# Patient Record
Sex: MALE | Race: BLACK OR AFRICAN AMERICAN | NOT HISPANIC OR LATINO | Employment: OTHER | URBAN - METROPOLITAN AREA
[De-identification: names, ages, dates, MRNs, and addresses within clinical notes are randomized per-mention and may not be internally consistent; named-entity substitution may affect disease eponyms.]

---

## 2021-02-26 ENCOUNTER — TELEPHONE (OUTPATIENT)
Dept: SCHEDULING | Facility: CLINIC | Age: 66
End: 2021-02-26

## 2021-02-26 NOTE — TELEPHONE ENCOUNTER
Cardiac Clearance     Name of caller: Clint    Relationship to patient: self    Name of patient: Clint DOWELL [place]    Name of physician: Deven Caban MD    Date of Surgery: to be determine    Type of Surgery: gastric bypass/ cataract surgery    Patient is having a couple of surgery coming first. Cataract surgery will be first.  Surgeon: Dr. Erlin Leal, pt does not have any numbers

## 2021-03-05 ENCOUNTER — OFFICE VISIT (OUTPATIENT)
Dept: CARDIOLOGY | Facility: CLINIC | Age: 66
End: 2021-03-05
Payer: MEDICARE

## 2021-03-05 VITALS — BODY MASS INDEX: 41.73 KG/M2 | HEIGHT: 72 IN | HEART RATE: 58 BPM | WEIGHT: 308.1 LBS

## 2021-03-05 DIAGNOSIS — Z01.810 PRE-OPERATIVE CARDIOVASCULAR EXAMINATION: Primary | ICD-10-CM

## 2021-03-05 PROCEDURE — 99205 OFFICE O/P NEW HI 60 MIN: CPT | Performed by: INTERNAL MEDICINE

## 2021-03-05 PROCEDURE — 93000 ELECTROCARDIOGRAM COMPLETE: CPT | Performed by: INTERNAL MEDICINE

## 2021-03-05 RX ORDER — TESTOSTERONE CYPIONATE 200 MG/ML
INJECTION, SOLUTION INTRAMUSCULAR
COMMUNITY

## 2021-03-05 RX ORDER — ASPIRIN 81 MG/1
81 TABLET ORAL DAILY
COMMUNITY
End: 2022-08-24 | Stop reason: ALTCHOICE

## 2021-03-05 RX ORDER — ATORVASTATIN CALCIUM 40 MG/1
40 TABLET, FILM COATED ORAL NIGHTLY
COMMUNITY
Start: 2020-08-10

## 2021-03-05 RX ORDER — PANTOPRAZOLE SODIUM 40 MG/1
40 TABLET, DELAYED RELEASE ORAL AS NEEDED
COMMUNITY

## 2021-03-05 RX ORDER — CARVEDILOL 12.5 MG/1
12.5 TABLET ORAL
COMMUNITY
Start: 2021-01-20 | End: 2021-03-05 | Stop reason: ALTCHOICE

## 2021-03-05 RX ORDER — TAMSULOSIN HYDROCHLORIDE 0.4 MG/1
0.4 CAPSULE ORAL DAILY
COMMUNITY

## 2021-03-05 RX ORDER — CHLORTHALIDONE 25 MG/1
25 TABLET ORAL DAILY
Qty: 90 TABLET | Refills: 1 | Status: SHIPPED | OUTPATIENT
Start: 2021-03-05 | End: 2021-08-17

## 2021-03-05 RX ORDER — FOLIC ACID 1 MG/1
1 TABLET ORAL DAILY
COMMUNITY

## 2021-03-05 RX ORDER — HYDROXYCHLOROQUINE SULFATE 200 MG/1
400 TABLET, FILM COATED ORAL DAILY
COMMUNITY
Start: 2021-02-10

## 2021-03-05 RX ORDER — DICLOFENAC SODIUM 50 MG/1
75 TABLET, DELAYED RELEASE ORAL AS NEEDED
COMMUNITY
End: 2022-08-24

## 2021-03-05 NOTE — LETTER
March 5, 2021     Kush Wen MD  [address]  [facility] [address]    Patient: Clint Pappas  YOB: 1955  Date of Visit: 3/5/2021      Dear Dr. Wen:    Thank you for referring Clint Pappas to me for evaluation. Below are my notes for this consultation.    If you have questions, please do not hesitate to call me. I look forward to following your patient along with you.         Sincerely,        Deven Caban MD        CC: No Recipients  Deven Caban MD  3/5/2021 10:17 AM  Sign when Signing Visit    CARDIOLOGY OUTPATIENT NOTE      Subjective:    Mr. Pappas is being referred for evaluation because of an abnormal stress test before consideration of bariatric surgery..  He has been having chest discomfort and dyspnea for close to 10 years with exertion.  It led to a cardiac catheterization about 10 years ago which according to him was normal.  He has a longstanding history of hypertension and the numbers in the office significantly different from the numbers at home.  He has angioedema with lisinopril.  He has no claudication or symptoms of cerebrovascular disease.  He has no diabetes mellitus.  He has no dizziness syncope near syncope.  He has no symptoms of heart failure.  He has longstanding rheumatoid arthritis in multiple joints has been affected.  He has intense fatigue not restricted to the morning.    Allergies: Acetaminophen, Lisinopril, and Oxycodone    Medications    Current Outpatient Medications:   •  aspirin 81 mg enteric coated tablet, Take 81 mg by mouth daily., Disp: , Rfl:   •  atorvastatin (LIPITOR) 40 mg tablet, Take 40 mg by mouth nightly., Disp: , Rfl:   •  diclofenac (VOLTAREN) 50 mg EC tablet, Take 50 mg by mouth 2 (two) times a day. One and a half pills, Disp: , Rfl:   •  folic acid (FOLVITE) 1 mg tablet, Take 1 mg by mouth daily., Disp: , Rfl:   •  hydrOXYchloroQUINE (PLAQUENIL) 200 mg tablet, , Disp: , Rfl:   •  methotrexate 2.5 mg tablet,  Take  once a week, Disp: , Rfl:   •  pantoprazole (PROTONIX) 40 mg EC tablet, Take 40 mg by mouth., Disp: , Rfl:   •  tamsulosin (FLOMAX) 0.4 mg capsule, Take 0.4 mg by mouth., Disp: , Rfl:   •  testosterone cypionate (DEPO-TESTOSTERONE) 200 mg/mL injection, every 14 (fourteen) days., Disp: , Rfl:   •  chlorthalidone (HYGROTEN) 25 mg tablet, Take 1 tablet (25 mg total) by mouth daily., Disp: 90 tablet, Rfl: 1      History  Medical History:   Past Medical History:   Diagnosis Date   • Arthritis    • Edema    • Hypertension    • Lipid disorder        Surgical History:   Past Surgical History:   Procedure Laterality Date   • CARPAL TUNNEL RELEASE Bilateral    • EYE SURGERY     • NOSE SURGERY         Social History:   Social History     Socioeconomic History   • Marital status:      Spouse name: None   • Number of children: None   • Years of education: None   • Highest education level: None   Occupational History   • None   Social Needs   • Financial resource strain: None   • Food insecurity     Worry: None     Inability: None   • Transportation needs     Medical: None     Non-medical: None   Tobacco Use   • Smoking status: Former Smoker   • Smokeless tobacco: Never Used   Substance and Sexual Activity   • Alcohol use: Not Currently   • Drug use: Never   • Sexual activity: Defer   Lifestyle   • Physical activity     Days per week: None     Minutes per session: None   • Stress: None   Relationships   • Social connections     Talks on phone: None     Gets together: None     Attends Anglican service: None     Active member of club or organization: None     Attends meetings of clubs or organizations: None     Relationship status: None   • Intimate partner violence     Fear of current or ex partner: None     Emotionally abused: None     Physically abused: None     Forced sexual activity: None   Other Topics Concern   • None   Social History Narrative   • None     Stopped smoking about 40 years ago.  Before that he  was 2 to 3 pack smoker.  Drinks 3 or 4 drinks per day.  Family History:   Family History   Problem Relation Age of Onset   • Heart disease Biological Mother          Review of Systems   10 point review of systems completed and otherwise negative unless noted above in the HPI      OBJECTIVE  Visit Vitals  Pulse (!) 58   Ht 1.829 m (6')   Wt (!) 140 kg (308 lb 1.6 oz)   BMI 41.79 kg/m²     Weights (last 7 days)     Date/Time   Weight    03/05/21 0914   (!) 140 kg (308 lb 1.6 oz)                Physical Exam   He is obese in no acute distress.  Weight is 308 pounds.  Blood pressure is 150/95 right arm sitting.    Normocephalic atraumatic.  No icterus.  No conjunctival abnormalities.  Short thick neck.  Normal jugular venous pressure.  Normal carotid bruits.  Excellent pedal pulses.    Quiet precordium.  S1 and S2 are normal.  Grade 1/6 basal ejection static murmur.    Lungs clear.  Abdomen soft no megalies no masses no abnormal pulsations but he does have an umbilical hernia which is easily reducible.  Large right upper quadrant scar from partial colonic resection.    No rashes breakdown or malignancy of the skin.  No clubbing cyanosis or edema.  Appropriate affect judgment and insight.  No sensorimotor deficits.    Labs  CBC Results     No lab values to display.        CMP Results     No lab values to display.        PT PTT Results     No lab values to display.        Troponin I Results     No lab values to display.          Cardiology results  EKG:    Sinus bradycardia otherwise normal            ASSESSMENT AND PLAN:    1.  Umbilical hernia.  I warned him of the symptoms of strangulation and incarceration.    2.  Morbid obesity.    3.  Hypertension.  May be better controlled at home than in the office.  With the fatigue beta-blockers are not an ideal choice and he developed angioedema with ACE inhibitors.  We will go ahead and taper the carvedilol and initiate chlorthalidone 25 mg p.o. daily with increase potassium  intake.  He tells me that he probably did not like a medication that starts with an A, likely amlodipine.  If diuretics alone do not control the blood pressure I would consider a nondihydropyridine antihypertensive.    4.  An echocardiogram on 1/8/2021 showed mild left ventricular hypertrophy with a 60% ejection fraction and wear-and-tear of the valves.  A pharmacologic stress test showed a large area of ischemia versus attenuation in the inferior wall.  As a result his cardiologist at [place] has recommended a cardiac catheterization.  The catheterization was supposed to take place at [facility] but he does not want to be treated there.  I had a long conversation with him.  The visit in fact lasted over an hour.  I think for a start CTA of his coronaries may rule out significant disease.  He does agree with that approach.  I do not have an assessment of his renal function but he says he had multiple tests yesterday that he will try to bring to me.    5.  Rheumatoid arthritis.  He was on biologicals at some point.  This is an inflammatory disease that increases the risk of coronary disease and aneurysms.  6.  Obstructive sleep apnea on a CPAP machine.  He will stay in touch with me with respect to his blood pressure.  I will await the labs before I schedule him for a coronary CTA.        Deven Caban MD  3/5/2021  10:07 AM

## 2021-03-05 NOTE — PROGRESS NOTES
5/21/21  Coronary CTA completed 4/27/21 and shows composite calcium score 837, with breakdown as follows:  LEFT MAIN: Moderate amount of circumferential calcified plaque seen.  There  appears to be a mild (30-50%) associated stenosis.  However, due to artifact,  the exact severity of stenosis could not be determined..  The vessel appears  most likely patent.     LAD:  Proximal: Diffuse calcified plaque seen.  Severity of stenosis could not be  determined due to artifact..  The vessel appears most likely patent.  Mid: Diffuse calcified plaque seen.  Severity of stenosis cannot be determined  due to artifact..  The vessel appears most likely patent.  Distal: Not well seen..  DIAG 1: Not well seen..  DIAG 2: Not well seen..     LCX:  Proximal: Diffuse calcified plaque seen.  The severity of stenosis could not be  determined due to artifact.  The vessel appears most likely patent.  Mid: Calcified plaque seen.  Severity of stenosis cannot be determined due to  artifact..  The vessel appears most likely patent.  Distal: Not well seen.  OM1:  Not well seen.  LPLB: Not well seen.  LPDA: Not well seen.     RCA:  Right coronary artery not well-visualized due to motion artifact..  No calcified  plaque seen.  Small nondominant vessel.    Results discussed with Dr. Perales. Patient has nonobstructive coronary artery disease. HE is on high intensity statin (atorvastatin 40 mg daily), aspirin 81 mg daily, reports controlled BP.  He may proceed with bariatric surgery at acceptable cardiovascular risk.    Deven Caban MD 5/21/21        CARDIOLOGY OUTPATIENT NOTE      Subjective:    Mr. Pappas is being referred for evaluation because of an abnormal stress test before consideration of bariatric surgery..  He has been having chest discomfort and dyspnea for close to 10 years with exertion.  It led to a cardiac catheterization about 10 years ago which according to him was normal.  He has a longstanding history of hypertension  and the numbers in the office significantly different from the numbers at home.  He has angioedema with lisinopril.  He has no claudication or symptoms of cerebrovascular disease.  He has no diabetes mellitus.  He has no dizziness syncope near syncope.  He has no symptoms of heart failure.  He has longstanding rheumatoid arthritis in multiple joints has been affected.  He has intense fatigue not restricted to the morning.    Allergies: Acetaminophen, Lisinopril, and Oxycodone    Medications    Current Outpatient Medications:   •  aspirin 81 mg enteric coated tablet, Take 81 mg by mouth daily., Disp: , Rfl:   •  atorvastatin (LIPITOR) 40 mg tablet, Take 40 mg by mouth nightly., Disp: , Rfl:   •  diclofenac (VOLTAREN) 50 mg EC tablet, Take 50 mg by mouth 2 (two) times a day. One and a half pills, Disp: , Rfl:   •  folic acid (FOLVITE) 1 mg tablet, Take 1 mg by mouth daily., Disp: , Rfl:   •  hydrOXYchloroQUINE (PLAQUENIL) 200 mg tablet, , Disp: , Rfl:   •  methotrexate 2.5 mg tablet, Take  once a week, Disp: , Rfl:   •  pantoprazole (PROTONIX) 40 mg EC tablet, Take 40 mg by mouth., Disp: , Rfl:   •  tamsulosin (FLOMAX) 0.4 mg capsule, Take 0.4 mg by mouth., Disp: , Rfl:   •  testosterone cypionate (DEPO-TESTOSTERONE) 200 mg/mL injection, every 14 (fourteen) days., Disp: , Rfl:   •  chlorthalidone (HYGROTEN) 25 mg tablet, Take 1 tablet (25 mg total) by mouth daily., Disp: 90 tablet, Rfl: 1      History  Medical History:   Past Medical History:   Diagnosis Date   • Arthritis    • Edema    • Hypertension    • Lipid disorder        Surgical History:   Past Surgical History:   Procedure Laterality Date   • CARPAL TUNNEL RELEASE Bilateral    • EYE SURGERY     • NOSE SURGERY         Social History:   Social History     Socioeconomic History   • Marital status:      Spouse name: None   • Number of children: None   • Years of education: None   • Highest education level: None   Occupational History   • None   Social  Needs   • Financial resource strain: None   • Food insecurity     Worry: None     Inability: None   • Transportation needs     Medical: None     Non-medical: None   Tobacco Use   • Smoking status: Former Smoker   • Smokeless tobacco: Never Used   Substance and Sexual Activity   • Alcohol use: Not Currently   • Drug use: Never   • Sexual activity: Defer   Lifestyle   • Physical activity     Days per week: None     Minutes per session: None   • Stress: None   Relationships   • Social connections     Talks on phone: None     Gets together: None     Attends Taoism service: None     Active member of club or organization: None     Attends meetings of clubs or organizations: None     Relationship status: None   • Intimate partner violence     Fear of current or ex partner: None     Emotionally abused: None     Physically abused: None     Forced sexual activity: None   Other Topics Concern   • None   Social History Narrative   • None     Stopped smoking about 40 years ago.  Before that he was 2 to 3 pack smoker.  Drinks 3 or 4 drinks per day.  Family History:   Family History   Problem Relation Age of Onset   • Heart disease Biological Mother          Review of Systems   10 point review of systems completed and otherwise negative unless noted above in the HPI      OBJECTIVE  Visit Vitals  Pulse (!) 58   Ht 1.829 m (6')   Wt (!) 140 kg (308 lb 1.6 oz)   BMI 41.79 kg/m²     Weights (last 7 days)     Date/Time   Weight    03/05/21 0914   (!) 140 kg (308 lb 1.6 oz)                Physical Exam   He is obese in no acute distress.  Weight is 308 pounds.  Blood pressure is 150/95 right arm sitting.    Normocephalic atraumatic.  No icterus.  No conjunctival abnormalities.  Short thick neck.  Normal jugular venous pressure.  Normal carotid bruits.  Excellent pedal pulses.    Quiet precordium.  S1 and S2 are normal.  Grade 1/6 basal ejection static murmur.    Lungs clear.  Abdomen soft no megalies no masses no abnormal pulsations  but he does have an umbilical hernia which is easily reducible.  Large right upper quadrant scar from partial colonic resection.    No rashes breakdown or malignancy of the skin.  No clubbing cyanosis or edema.  Appropriate affect judgment and insight.  No sensorimotor deficits.    Labs  CBC Results     No lab values to display.        CMP Results     No lab values to display.        PT PTT Results     No lab values to display.        Troponin I Results     No lab values to display.          Cardiology results  EKG:    Sinus bradycardia otherwise normal            ASSESSMENT AND PLAN:    1.  Umbilical hernia.  I warned him of the symptoms of strangulation and incarceration.    2.  Morbid obesity.    3.  Hypertension.  May be better controlled at home than in the office.  With the fatigue beta-blockers are not an ideal choice and he developed angioedema with ACE inhibitors.  We will go ahead and taper the carvedilol and initiate chlorthalidone 25 mg p.o. daily with increase potassium intake.  He tells me that he probably did not like a medication that starts with an A, likely amlodipine.  If diuretics alone do not control the blood pressure I would consider a nondihydropyridine antihypertensive.    4.  An echocardiogram on 1/8/2021 showed mild left ventricular hypertrophy with a 60% ejection fraction and wear-and-tear of the valves.  A pharmacologic stress test showed a large area of ischemia versus attenuation in the inferior wall.  As a result his cardiologist at [place] has recommended a cardiac catheterization.  The catheterization was supposed to take place at [facility] but he does not want to be treated there.  I had a long conversation with him.  The visit in fact lasted over an hour.  I think for a start CTA of his coronaries may rule out significant disease.  He does agree with that approach.  I do not have an assessment of his renal function but he says he had multiple tests yesterday that  he will try to bring to me.    5.  Rheumatoid arthritis.  He was on biologicals at some point.  This is an inflammatory disease that increases the risk of coronary disease and aneurysms.  6.  Obstructive sleep apnea on a CPAP machine.  He will stay in touch with me with respect to his blood pressure.  I will await the labs before I schedule him for a coronary CTA.        Deven Caban MD  3/5/2021  10:07 AM

## 2021-03-09 ENCOUNTER — TELEPHONE (OUTPATIENT)
Dept: SCHEDULING | Facility: CLINIC | Age: 66
End: 2021-03-09

## 2021-03-09 NOTE — TELEPHONE ENCOUNTER
Pt calling to verify Dr Caban received his lab results and to see what test/appt he is to schedule next.  Pt states he was schedule some testing after Dr Caban saw his blood work, he believes.  Pt can be reached at [phone].

## 2021-03-09 NOTE — TELEPHONE ENCOUNTER
;  Lab results available in [facility].  Results from 3/4/2021  BUN 18  Creat 0.99  He will stay in touch with me with respect to his blood pressure.  I will await the labs before I schedule him for a coronary CTA.

## 2021-03-24 ENCOUNTER — TELEPHONE (OUTPATIENT)
Dept: CARDIOLOGY | Facility: CLINIC | Age: 66
End: 2021-03-24

## 2021-03-24 DIAGNOSIS — G47.33 OBSTRUCTIVE SLEEP APNEA SYNDROME: ICD-10-CM

## 2021-03-24 DIAGNOSIS — R94.39 ABNORMAL STRESS TEST: Primary | ICD-10-CM

## 2021-03-24 DIAGNOSIS — I51.7 LVH (LEFT VENTRICULAR HYPERTROPHY): ICD-10-CM

## 2021-03-24 DIAGNOSIS — I10 ESSENTIAL HYPERTENSION: ICD-10-CM

## 2021-03-24 NOTE — TELEPHONE ENCOUNTER
I wanted to schedule him for a CTA of his coronaries.  His serum creatinine is normal.  He may proceed.  I do not remember if I issued him a request slip or not but if not let us go ahead and order a CTA of the coronaries.  Indication equivocal stress test.  [facility] has a better scanner if he can make it there

## 2021-03-24 NOTE — TELEPHONE ENCOUNTER
CTA coronary ordered.      Nydirah:    Please precert and assist with scheduling.  [facility] recommended [facility] for test if patient can get there.  Thank you.

## 2021-03-24 NOTE — TELEPHONE ENCOUNTER
Pt calling to follow up.  Pt calling to schedule the procedure that they talked about at his appt.  Pt thought he would hear back to schedule it.  Pt doesn't remember what it was called but states it was a less invasive procedure to check out his heart. Pt will have the bariatric surgery but it wont be for a few months and he thought he was getting this procedure done first.  Pt can be reached at [phone]..

## 2021-04-27 ENCOUNTER — HOSPITAL ENCOUNTER (OUTPATIENT)
Dept: RADIOLOGY | Facility: HOSPITAL | Age: 66
Discharge: HOME | End: 2021-04-27
Attending: INTERNAL MEDICINE
Payer: MEDICARE

## 2021-04-27 VITALS
BODY MASS INDEX: 40.88 KG/M2 | HEART RATE: 50 BPM | RESPIRATION RATE: 18 BRPM | DIASTOLIC BLOOD PRESSURE: 63 MMHG | SYSTOLIC BLOOD PRESSURE: 156 MMHG | WEIGHT: 292 LBS | HEIGHT: 71 IN | OXYGEN SATURATION: 97 %

## 2021-04-27 DIAGNOSIS — I51.7 LVH (LEFT VENTRICULAR HYPERTROPHY): ICD-10-CM

## 2021-04-27 DIAGNOSIS — R94.39 ABNORMAL STRESS TEST: ICD-10-CM

## 2021-04-27 DIAGNOSIS — G47.33 OBSTRUCTIVE SLEEP APNEA SYNDROME: ICD-10-CM

## 2021-04-27 DIAGNOSIS — I10 ESSENTIAL HYPERTENSION: ICD-10-CM

## 2021-04-27 PROCEDURE — 75574 CT ANGIO HRT W/3D IMAGE: CPT | Mod: ME

## 2021-04-27 PROCEDURE — 63700000 HC SELF-ADMINISTRABLE DRUG: Performed by: INTERNAL MEDICINE

## 2021-04-27 PROCEDURE — 63600105 HC IODINE BASED CONTRAST: Performed by: INTERNAL MEDICINE

## 2021-04-27 RX ORDER — METOPROLOL TARTRATE 50 MG/1
50 TABLET ORAL EVERY 30 MIN PRN
Status: DISCONTINUED | OUTPATIENT
Start: 2021-04-27 | End: 2021-04-28 | Stop reason: HOSPADM

## 2021-04-27 RX ORDER — IVABRADINE 7.5 MG/1
15 TABLET, FILM COATED ORAL ONCE
Status: COMPLETED | OUTPATIENT
Start: 2021-04-27 | End: 2021-04-27

## 2021-04-27 RX ORDER — METOPROLOL TARTRATE 50 MG/1
100 TABLET ORAL ONCE
Status: COMPLETED | OUTPATIENT
Start: 2021-04-27 | End: 2021-04-27

## 2021-04-27 RX ORDER — NITROGLYCERIN 0.4 MG/1
0.4 TABLET SUBLINGUAL ONCE
Status: COMPLETED | OUTPATIENT
Start: 2021-04-27 | End: 2021-04-27

## 2021-04-27 RX ORDER — METOPROLOL TARTRATE 1 MG/ML
5 INJECTION, SOLUTION INTRAVENOUS AS NEEDED
Status: DISCONTINUED | OUTPATIENT
Start: 2021-04-27 | End: 2021-04-28 | Stop reason: HOSPADM

## 2021-04-27 RX ADMIN — NITROGLYCERIN 0.4 MG: 0.4 TABLET SUBLINGUAL at 11:33

## 2021-04-27 RX ADMIN — IOHEXOL 100 ML: 350 INJECTION, SOLUTION INTRAVENOUS at 11:49

## 2021-04-27 RX ADMIN — IVABRADINE 15 MG: 7.5 TABLET, FILM COATED ORAL at 09:49

## 2021-04-27 RX ADMIN — METOPROLOL TARTRATE 100 MG: 50 TABLET, FILM COATED ORAL at 09:25

## 2021-04-27 RX ADMIN — METOPROLOL TARTRATE 50 MG: 50 TABLET, FILM COATED ORAL at 09:58

## 2021-04-27 NOTE — DISCHARGE INSTRUCTIONS
Cardiac CT Angiogram    A cardiac CT angiogram is a procedure to look at the heart and the area around the heart. It may be done to help find the cause of chest pains or other symptoms of heart disease. During this procedure, a large X-ray machine, called a CT scanner, takes detailed pictures of the heart and the surrounding area after a dye (contrast material) has been injected into blood vessels in the area. The procedure is also sometimes called a coronary CT angiogram, coronary artery scanning, or CTA.  A cardiac CT angiogram allows the health care provider to see how well blood is flowing to and from the heart. The health care provider will be able to see if there are any problems, such as:  · Blockage or narrowing of the coronary arteries in the heart.  · Fluid around the heart.  · Signs of weakness or disease in the muscles, valves, and tissues of the heart.  Tell a health care provider about:  · Any allergies you have. This is especially important if you have had a previous allergic reaction to contrast dye.  · All medicines you are taking, including vitamins, herbs, eye drops, creams, and over-the-counter medicines.  · Any blood disorders you have.  · Any surgeries you have had.  · Any medical conditions you have.  · Whether you are pregnant or may be pregnant.  · Any anxiety disorders, chronic pain, or other conditions you have that may increase your stress or prevent you from lying still.  What are the risks?  Generally, this is a safe procedure. However, problems may occur, including:  · Bleeding.  · Infection.  · Allergic reactions to medicines or dyes.  · Damage to other structures or organs.  · Kidney damage from the dye or contrast that is used.  · Increased risk of cancer from radiation exposure. This risk is low. Talk with your health care provider about:  ? The risks and benefits of testing.  ? How you can receive the lowest dose of radiation.  What happens before the procedure?  · Wear  comfortable clothing and remove any jewelry, glasses, dentures, and hearing aids.  · Follow instructions from your health care provider about eating and drinking. This may include:  ? For 12 hours before the test -- avoid caffeine. This includes tea, coffee, soda, energy drinks, and diet pills. Drink plenty of water or other fluids that do not have caffeine in them. Being well-hydrated can prevent complications.  ? For 4-6 hours before the test -- stop eating and drinking. The contrast dye can cause nausea, but this is less likely if your stomach is empty.  · Ask your health care provider about changing or stopping your regular medicines. This is especially important if you are taking diabetes medicines, blood thinners, or medicines to treat erectile dysfunction.  What happens during the procedure?  · Hair on your chest may need to be removed so that small sticky patches called electrodes can be placed on your chest. These will transmit information that helps to monitor your heart during the test.  · An IV tube will be inserted into one of your veins.  · You might be given a medicine to control your heart rate during the test. This will help to ensure that good images are obtained.  · You will be asked to lie on an exam table. This table will slide in and out of the CT machine during the procedure.  · Contrast dye will be injected into the IV tube. You might feel warm, or you may get a metallic taste in your mouth.  · You will be given a medicine (nitroglycerin) to relax (dilate) the arteries in your heart.  · The table that you are lying on will move into the CT machine tunnel for the scan.  · The person running the machine will give you instructions while the scans are being done. You may be asked to:  ? Keep your arms above your head.  ? Hold your breath.  ? Stay very still, even if the table is moving.  · When the scanning is complete, you will be moved out of the machine.  · The IV tube will be removed.  The  procedure may vary among health care providers and hospitals.  What happens after the procedure?  · You might feel warm, or you may get a metallic taste in your mouth from the contrast dye.  · You may have a headache from the nitroglycerin.  · After the procedure, drink water or other fluids to wash (flush) the contrast material out of your body.  · Contact a health care provider if you have any symptoms of allergy to the contrast. These symptoms include:  ? Shortness of breath.  ? Rash or hives.  ? A racing heartbeat.  · Most people can return to their normal activities right after the procedure. Ask your health care provider what activities are safe for you.  · It is up to you to get the results of your procedure. Ask your health care provider, or the department that is doing the procedure, when your results will be ready.  Summary  · A cardiac CT angiogram is a procedure to look at the heart and the area around the heart. It may be done to help find the cause of chest pains or other symptoms of heart disease.  · During this procedure, a large X-ray machine, called a CT scanner, takes detailed pictures of the heart and the surrounding area after a dye (contrast material) has been injected into blood vessels in the area.  · Ask your health care provider about changing or stopping your regular medicines before the procedure. This is especially important if you are taking diabetes medicines, blood thinners, or medicines to treat erectile dysfunction.  · After the procedure, drink water or other fluids to wash (flush) the contrast material out of your body.  This information is not intended to replace advice given to you by your health care provider. Make sure you discuss any questions you have with your health care provider.  Document Released: 11/30/2009 Document Revised: 11/30/2018 Document Reviewed: 11/06/2017  Elsevier Patient Education © 2020 [employer]

## 2021-05-06 ENCOUNTER — TELEPHONE (OUTPATIENT)
Dept: SCHEDULING | Facility: CLINIC | Age: 66
End: 2021-05-06

## 2021-05-06 NOTE — TELEPHONE ENCOUNTER
Cardiac Clearance     [facility]    Relationship to patient: Coordinator     Name of patient: Clint Pappas    Name of physician: Deven Caban MD    Date of Surgery: 06/07/21    Type of Surgery: Bariatric Surgery    Name of surgeon: Dr. Alverto Iniguez    Office contact number: [phone]    Office fax number: [phone]   attention Day    Addendums  Is patient able to be cleared based on last appt on 03/05/21  If unable to clear patient, please contact patient at [phone]    Additional notes: If anything is faxed to the office please put Attention Day

## 2021-05-17 ENCOUNTER — DOCUMENTATION (OUTPATIENT)
Dept: CARDIOLOGY | Facility: CLINIC | Age: 66
End: 2021-05-17

## 2021-05-17 NOTE — PROGRESS NOTES
I asked  to review his films (CTA of the coronaries).  His LV left main has less than 50% obstruction which is not flow-limiting and the other coronaries have less than 30% obstruction.  There is no need for a cardiac catheterization.  He needs to have aggressive risk factor modification.

## 2021-05-19 ENCOUNTER — TELEPHONE (OUTPATIENT)
Dept: CARDIOLOGY | Facility: CLINIC | Age: 66
End: 2021-05-19

## 2021-05-19 NOTE — TELEPHONE ENCOUNTER
"S/w pt  His PCP reviewed CT angiogram with him.  He's aware he has coronary artery disease.  He denies any symptoms.  He is taking amlodipine, chlorthalidone, atorvastatin, aspirin.  Reports his cholesterol is \"good\".  He reports hi BP on Monday ( 5/17) was 114/78.  He is scheduled for bariatric surgery on 5/24/21.  He will need addendum to your note from 3/5/21.  Please advise and I will go ahead an do addendum.  "

## 2021-05-25 NOTE — TELEPHONE ENCOUNTER
[facility] was calling to follow up on previous messages regarding CC.    Day can be reached at [phone]

## 2021-05-25 NOTE — TELEPHONE ENCOUNTER
On 5/21/2021, there is a small addendum on the CT scan of the coronaries results saying that he is cleared to proceed.  I hope this has reached him.  If not send them a copy of that particular note of this as well as my last visit note

## 2021-06-08 NOTE — TELEPHONE ENCOUNTER
Day, with [place], calling stating she had not received CCV yet.  OVN with addendum sent.        [facility]             [phone]             [phone]

## 2021-08-17 RX ORDER — CHLORTHALIDONE 25 MG/1
25 TABLET ORAL DAILY
Qty: 90 TABLET | Refills: 0 | Status: SHIPPED | OUTPATIENT
Start: 2021-08-17 | End: 2022-08-24 | Stop reason: ALTCHOICE

## 2021-09-08 ENCOUNTER — TELEPHONE (OUTPATIENT)
Dept: SCHEDULING | Facility: CLINIC | Age: 66
End: 2021-09-08

## 2021-09-08 NOTE — TELEPHONE ENCOUNTER
Pt's calling to get results of CTA and if Dr. Caban would like him to have a follow up appt.    Pt can be reached [phone]

## 2021-09-08 NOTE — TELEPHONE ENCOUNTER
Spoke with patient.  States would like to schedule appointment with [facility].  SOB with exertion X few years.  Known elevated calcium score.  Seen pre-op for planned bariatric surgery.  Did not proceed to surgery.  No recent lipids/cmp.      Jose, can you get patient scheduled for follow up OV,please.  Thank you.

## 2021-09-08 NOTE — TELEPHONE ENCOUNTER
Test Results     Name of caller: Clint    Relationship to patient: Self    Name of patient: Clint Pappas    Name of physician: Deven Caban MD    Type of test: CTA    Best contact number: [phone] pt also wants to know if he needs a follow up appt

## 2021-11-04 ENCOUNTER — OFFICE VISIT (OUTPATIENT)
Dept: CARDIOLOGY | Facility: CLINIC | Age: 66
End: 2021-11-04
Payer: MEDICARE

## 2021-11-04 VITALS — HEART RATE: 77 BPM | BODY MASS INDEX: 38.75 KG/M2 | HEIGHT: 72 IN | WEIGHT: 286.1 LBS

## 2021-11-04 DIAGNOSIS — I10 ESSENTIAL HYPERTENSION: Primary | ICD-10-CM

## 2021-11-04 PROCEDURE — 99214 OFFICE O/P EST MOD 30 MIN: CPT | Performed by: INTERNAL MEDICINE

## 2021-11-04 PROCEDURE — 93000 ELECTROCARDIOGRAM COMPLETE: CPT | Performed by: INTERNAL MEDICINE

## 2021-11-04 PROCEDURE — G8756 NO BP MEASURE DOC: HCPCS | Performed by: INTERNAL MEDICINE

## 2021-11-04 NOTE — LETTER
November 4, 2021     Kush Wen MD  [address]  [facility] [address]    Patient: Clint Pappas  YOB: 1955  Date of Visit: 11/4/2021      Dear Dr. Wen:    Thank you for referring Clint Pappas to me for evaluation. Below are my notes for this consultation.    If you have questions, please do not hesitate to call me. I look forward to following your patient along with you.         Sincerely,        Deven Caban MD        CC: MD Mee Christopher, Deven BUENROSTRO MD  11/4/2021 10:36 AM  Sign when Signing Visit    CARDIOLOGY OUTPATIENT NOTE      Subjective:  Clint change his lifestyle dramatically and add as a result lost 22 pounds.  He feels much better.  He did not go through his bariatric surgery.  He has no angina or dyspnea dizziness syncope near syncope claudication or symptoms of cerebrovascular disease.  He seems to proceed with a right knee arthroplasty and who will be seeing an orthopedist who is part of the [facility].      Allergies: Acetaminophen, Lisinopril, and Oxycodone    Medications    Current Outpatient Medications:   •  aspirin 81 mg enteric coated tablet, Take 81 mg by mouth daily., Disp: , Rfl:   •  atorvastatin (LIPITOR) 40 mg tablet, Take 40 mg by mouth nightly., Disp: , Rfl:   •  chlorthalidone (HYGROTEN) 25 mg tablet, Take 1 tablet (25 mg total) by mouth daily., Disp: 90 tablet, Rfl: 0  •  diclofenac (VOLTAREN) 50 mg EC tablet, Take 50 mg by mouth 2 (two) times a day. One and a half pills, Disp: , Rfl:   •  diclofenac-capsaicin 75 mg- 0.025 % kit, , Disp: , Rfl:   •  folic acid (FOLVITE) 1 mg tablet, Take 1 mg by mouth daily., Disp: , Rfl:   •  golimumab (SIMPONI ARIA IV), Infuse into a venous catheter. Every other month, Disp: , Rfl:   •  hydrOXYchloroQUINE (PLAQUENIL) 200 mg tablet, , Disp: , Rfl:   •  methotrexate 2.5 mg tablet, Take  once a week, Disp: , Rfl:   •  pantoprazole (PROTONIX) 40 mg EC tablet, Take 40 mg by mouth., Disp: , Rfl:   •   tamsulosin (FLOMAX) 0.4 mg capsule, Take 0.4 mg by mouth., Disp: , Rfl:   •  testosterone cypionate (DEPO-TESTOSTERONE) 200 mg/mL injection, every 14 (fourteen) days., Disp: , Rfl:       History  Medical History:   Past Medical History:   Diagnosis Date   • Arthritis    • Edema    • Hypertension    • Lipid disorder        Surgical History:   Past Surgical History:   Procedure Laterality Date   • CARPAL TUNNEL RELEASE Bilateral    • EYE SURGERY     • NOSE SURGERY         Social History:   Social History     Socioeconomic History   • Marital status:      Spouse name: None   • Number of children: None   • Years of education: None   • Highest education level: None   Occupational History   • None   Tobacco Use   • Smoking status: Former Smoker   • Smokeless tobacco: Never Used   Vaping Use   • Vaping Use: Never used   Substance and Sexual Activity   • Alcohol use: Not Currently   • Drug use: Never   • Sexual activity: Defer   Other Topics Concern   • None   Social History Narrative   • None     Social Determinants of Health     Financial Resource Strain:    • Difficulty of Paying Living Expenses: Not on file   Food Insecurity:    • Worried About Running Out of Food in the Last Year: Not on file   • Ran Out of Food in the Last Year: Not on file   Transportation Needs:    • Lack of Transportation (Medical): Not on file   • Lack of Transportation (Non-Medical): Not on file   Physical Activity:    • Days of Exercise per Week: Not on file   • Minutes of Exercise per Session: Not on file   Stress:    • Feeling of Stress : Not on file   Social Connections:    • Frequency of Communication with Friends and Family: Not on file   • Frequency of Social Gatherings with Friends and Family: Not on file   • Attends Anabaptist Services: Not on file   • Active Member of Clubs or Organizations: Not on file   • Attends Club or Organization Meetings: Not on file   • Marital Status: Not on file   Intimate Partner Violence:    • Fear of  "Current or Ex-Partner: Not on file   • Emotionally Abused: Not on file   • Physically Abused: Not on file   • Sexually Abused: Not on file   Housing Stability:    • Unable to Pay for Housing in the Last Year: Not on file   • Number of Places Lived in the Last Year: Not on file   • Unstable Housing in the Last Year: Not on file       Family History:   Family History   Problem Relation Age of Onset   • Heart disease Biological Mother          Review of Systems   14 point review of systems completed and otherwise negative unless noted above in the HPI      OBJECTIVE  Visit Vitals  Pulse 77   Ht 1.816 m (5' 11.5\")   Wt 130 kg (286 lb 1.6 oz)   BMI 39.35 kg/m²     Weights (last 7 days)     Date/Time Weight    11/04/21 0959 130 kg (286 lb 1.6 oz)            Physical Exam   He is obese in no acute distress.  Weight is 286 pounds again 22 pounds less than 8 months ago.  Blood pressure is 120/70 right arm sitting.    Alert oriented x3.  Normocephalic atraumatic.  No icterus.  No conjunctival pallor.  Short thick neck.  Normal jugular venous pressure.  Normal carotid upstroke without bruits.  Excellent pedal pulses.    Quiet precordium.  S1 and S2 are normal.  Grade 1/6 basal ejection solid murmur.    Lungs clear.  Abdomen soft no megalies no masses no abnormal pulsations.  Easily reducible abdominal hernia.  Large right upper quadrant scar from partial colonic resection.    No rashes breakdown or malignancy of the skin.  No clubbing cyanosis or edema.  Appropriate affect judgment and insight.  No sensorimotor deficits.    Labs  CBC Results    No lab values to display.       CMP Results    No lab values to display.       PT PTT Results    No lab values to display.       Troponin I Results    No lab values to display.         Cardiology results  EKG:  Sinus rhythm with 1 ventricular couplet.    CTA of the coronaries on 3/24/2021 reveals a total calcium score of 837.    Left main 30 to 50% stenosis described at mild.    LAD " diffuse calcific plaque.    Circumflex diffuse calcific plaque    RCA no calcified plaque.  Small vessel.    They concluded by saying that the vessels appear most likely patent however the severity could not be definitely determined due to artifact.    ASSESSMENT AND PLAN:    1.  Umbilical hernia.  Again we stressed the symptoms of incarceration or strangulation and need to come in immediately for follow-up.    2.  Obesity.  Dramatic decrease in weight by lifestyle changes.    3.  Hypertension.  Much improved with weight loss.  He has angioedema with ACE inhibitors.  Chlorthalidone works well.  He may be intolerant to amlodipine.    4.  CTA as above.  Doubt significant obstruction but in the absence of any chest discomfort and normal LV function we will continue risk factor modification without invasive procedures.  He should continue atorvastatin at 40 to 80 mg daily and a baby aspirin.    5.  Rheumatoid arthritis.  He takes Voltaren extremely rarely.    6.  Obstructive sleep apnea.  His machine has not been checked for a while.    7.  He thinks of proceeding with right knee arthroplasty.  If he decides to go through the procedure he may proceed at low cardiac risk with deep vein thrombosis prophylaxis.      Deven Caban MD  11/4/2021  10:10 AM

## 2021-11-04 NOTE — PROGRESS NOTES
CARDIOLOGY OUTPATIENT NOTE      Subjective:  Clint change his lifestyle dramatically and add as a result lost 22 pounds.  He feels much better.  He did not go through his bariatric surgery.  He has no angina or dyspnea dizziness syncope near syncope claudication or symptoms of cerebrovascular disease.  He seems to proceed with a right knee arthroplasty and who will be seeing an orthopedist who is part of [facility].      Allergies: Acetaminophen, Lisinopril, and Oxycodone    Medications    Current Outpatient Medications:   •  aspirin 81 mg enteric coated tablet, Take 81 mg by mouth daily., Disp: , Rfl:   •  atorvastatin (LIPITOR) 40 mg tablet, Take 40 mg by mouth nightly., Disp: , Rfl:   •  chlorthalidone (HYGROTEN) 25 mg tablet, Take 1 tablet (25 mg total) by mouth daily., Disp: 90 tablet, Rfl: 0  •  diclofenac (VOLTAREN) 50 mg EC tablet, Take 50 mg by mouth 2 (two) times a day. One and a half pills, Disp: , Rfl:   •  diclofenac-capsaicin 75 mg- 0.025 % kit, , Disp: , Rfl:   •  folic acid (FOLVITE) 1 mg tablet, Take 1 mg by mouth daily., Disp: , Rfl:   •  golimumab (SIMPONI ARIA IV), Infuse into a venous catheter. Every other month, Disp: , Rfl:   •  hydrOXYchloroQUINE (PLAQUENIL) 200 mg tablet, , Disp: , Rfl:   •  methotrexate 2.5 mg tablet, Take  once a week, Disp: , Rfl:   •  pantoprazole (PROTONIX) 40 mg EC tablet, Take 40 mg by mouth., Disp: , Rfl:   •  tamsulosin (FLOMAX) 0.4 mg capsule, Take 0.4 mg by mouth., Disp: , Rfl:   •  testosterone cypionate (DEPO-TESTOSTERONE) 200 mg/mL injection, every 14 (fourteen) days., Disp: , Rfl:       History  Medical History:   Past Medical History:   Diagnosis Date   • Arthritis    • Edema    • Hypertension    • Lipid disorder        Surgical History:   Past Surgical History:   Procedure Laterality Date   • CARPAL TUNNEL RELEASE Bilateral    • EYE SURGERY     • NOSE SURGERY         Social History:   Social History     Socioeconomic History   • Marital status:       Spouse name: None   • Number of children: None   • Years of education: None   • Highest education level: None   Occupational History   • None   Tobacco Use   • Smoking status: Former Smoker   • Smokeless tobacco: Never Used   Vaping Use   • Vaping Use: Never used   Substance and Sexual Activity   • Alcohol use: Not Currently   • Drug use: Never   • Sexual activity: Defer   Other Topics Concern   • None   Social History Narrative   • None     Social Determinants of Health     Financial Resource Strain:    • Difficulty of Paying Living Expenses: Not on file   Food Insecurity:    • Worried About Running Out of Food in the Last Year: Not on file   • Ran Out of Food in the Last Year: Not on file   Transportation Needs:    • Lack of Transportation (Medical): Not on file   • Lack of Transportation (Non-Medical): Not on file   Physical Activity:    • Days of Exercise per Week: Not on file   • Minutes of Exercise per Session: Not on file   Stress:    • Feeling of Stress : Not on file   Social Connections:    • Frequency of Communication with Friends and Family: Not on file   • Frequency of Social Gatherings with Friends and Family: Not on file   • Attends Yazdanism Services: Not on file   • Active Member of Clubs or Organizations: Not on file   • Attends Club or Organization Meetings: Not on file   • Marital Status: Not on file   Intimate Partner Violence:    • Fear of Current or Ex-Partner: Not on file   • Emotionally Abused: Not on file   • Physically Abused: Not on file   • Sexually Abused: Not on file   Housing Stability:    • Unable to Pay for Housing in the Last Year: Not on file   • Number of Places Lived in the Last Year: Not on file   • Unstable Housing in the Last Year: Not on file       Family History:   Family History   Problem Relation Age of Onset   • Heart disease Biological Mother          Review of Systems   14 point review of systems completed and otherwise negative unless noted above in the  "HPI      OBJECTIVE  Visit Vitals  Pulse 77   Ht 1.816 m (5' 11.5\")   Wt 130 kg (286 lb 1.6 oz)   BMI 39.35 kg/m²     Weights (last 7 days)     Date/Time Weight    11/04/21 0959 130 kg (286 lb 1.6 oz)            Physical Exam   He is obese in no acute distress.  Weight is 286 pounds again 22 pounds less than 8 months ago.  Blood pressure is 120/70 right arm sitting.    Alert oriented x3.  Normocephalic atraumatic.  No icterus.  No conjunctival pallor.  Short thick neck.  Normal jugular venous pressure.  Normal carotid upstroke without bruits.  Excellent pedal pulses.    Quiet precordium.  S1 and S2 are normal.  Grade 1/6 basal ejection solid murmur.    Lungs clear.  Abdomen soft no megalies no masses no abnormal pulsations.  Easily reducible abdominal hernia.  Large right upper quadrant scar from partial colonic resection.    No rashes breakdown or malignancy of the skin.  No clubbing cyanosis or edema.  Appropriate affect judgment and insight.  No sensorimotor deficits.    Labs  CBC Results    No lab values to display.       CMP Results    No lab values to display.       PT PTT Results    No lab values to display.       Troponin I Results    No lab values to display.         Cardiology results  EKG:  Sinus rhythm with 1 ventricular couplet.    CTA of the coronaries on 3/24/2021 reveals a total calcium score of 837.    Left main 30 to 50% stenosis described at mild.    LAD diffuse calcific plaque.    Circumflex diffuse calcific plaque    RCA no calcified plaque.  Small vessel.    They concluded by saying that the vessels appear most likely patent however the severity could not be definitely determined due to artifact.    ASSESSMENT AND PLAN:    1.  Umbilical hernia.  Again we stressed the symptoms of incarceration or strangulation and need to come in immediately for follow-up.    2.  Obesity.  Dramatic decrease in weight by lifestyle changes.    3.  Hypertension.  Much improved with weight loss.  He has angioedema " with ACE inhibitors.  Chlorthalidone works well.  He may be intolerant to amlodipine.    4.  CTA as above.  Doubt significant obstruction but in the absence of any chest discomfort and normal LV function we will continue risk factor modification without invasive procedures.  He should continue atorvastatin at 40 to 80 mg daily and a baby aspirin.    5.  Rheumatoid arthritis.  He takes Voltaren extremely rarely.    6.  Obstructive sleep apnea.  His machine has not been checked for a while.    7.  He thinks of proceeding with right knee arthroplasty.  If he decides to go through the procedure he may proceed at low cardiac risk with deep vein thrombosis prophylaxis.      Deven Caban MD  11/4/2021  10:10 AM

## 2022-02-09 ENCOUNTER — TELEPHONE (OUTPATIENT)
Dept: SCHEDULING | Facility: CLINIC | Age: 67
End: 2022-02-09
Payer: MEDICARE

## 2022-02-09 NOTE — TELEPHONE ENCOUNTER
Cardiac Clearance     Name of caller: Clint baez    Relationship to patient: Pt    Name of patient: Clint DOWELL Mian    Name of physician: Deven Caban MD    Date of Surgery: 2/28/22    Type of Surgery: R Knee replacement    Name of surgeon: Dr. Lamont Lei    Office contact number: [phone]    Office fax number: [phone]    Addendums  Is patient able to be cleared based on last appt on 11/04/2021  If unable to clear patient, please contact patient at [phone].

## 2022-02-09 NOTE — TELEPHONE ENCOUNTER
Already addressed in last note.  May proceed at acceptable risk  Renuka, please fax note to surgeon office with ECG. tx

## 2022-05-04 ENCOUNTER — OFFICE VISIT (OUTPATIENT)
Dept: CARDIOLOGY | Facility: CLINIC | Age: 67
End: 2022-05-04
Payer: MEDICARE

## 2022-05-04 VITALS — HEART RATE: 97 BPM | HEIGHT: 72 IN | OXYGEN SATURATION: 70 % | BODY MASS INDEX: 38.06 KG/M2 | WEIGHT: 281 LBS

## 2022-05-04 DIAGNOSIS — I10 ESSENTIAL HYPERTENSION: Primary | ICD-10-CM

## 2022-05-04 PROCEDURE — G8756 NO BP MEASURE DOC: HCPCS | Performed by: INTERNAL MEDICINE

## 2022-05-04 PROCEDURE — 93000 ELECTROCARDIOGRAM COMPLETE: CPT | Performed by: INTERNAL MEDICINE

## 2022-05-04 PROCEDURE — 99214 OFFICE O/P EST MOD 30 MIN: CPT | Performed by: INTERNAL MEDICINE

## 2022-05-04 RX ORDER — FLUTICASONE PROPIONATE 50 MCG
2 SPRAY, SUSPENSION (ML) NASAL AS NEEDED
COMMUNITY

## 2022-05-04 NOTE — PROGRESS NOTES
CARDIOLOGY OUTPATIENT NOTE      Subjective:  Mack comes for follow-up.  He had his knee replacement with excellent results.  He has no angina dyspnea dizziness syncope near syncope claudication or symptoms of cerebrovascular disease.  His rheumatoid arthritis is reasonably under control.      Allergies: Acetaminophen, Lisinopril, and Oxycodone    Medications    Current Outpatient Medications:   •  aspirin 81 mg enteric coated tablet, Take 81 mg by mouth daily., Disp: , Rfl:   •  atorvastatin (LIPITOR) 40 mg tablet, Take 40 mg by mouth nightly., Disp: , Rfl:   •  chlorthalidone (HYGROTEN) 25 mg tablet, Take 1 tablet (25 mg total) by mouth daily., Disp: 90 tablet, Rfl: 0  •  diclofenac (VOLTAREN) 50 mg EC tablet, Take 75 mg by mouth as needed. One and a half pills, Disp: , Rfl:   •  diclofenac-capsaicin 75 mg- 0.025 % kit, , Disp: , Rfl:   •  fluticasone propionate (FLONASE) 50 mcg/actuation nasal spray, fluticasone propionate 50 mcg/actuation nasal spray,suspension, Disp: , Rfl:   •  folic acid (FOLVITE) 1 mg tablet, Take 1 mg by mouth daily., Disp: , Rfl:   •  hydrOXYchloroQUINE (PLAQUENIL) 200 mg tablet, , Disp: , Rfl:   •  pantoprazole (PROTONIX) 40 mg EC tablet, Take 40 mg by mouth., Disp: , Rfl:   •  tamsulosin (FLOMAX) 0.4 mg capsule, Take 0.4 mg by mouth., Disp: , Rfl:   •  testosterone cypionate (DEPO-TESTOTERONE) 200 mg/mL injection, every 14 (fourteen) days., Disp: , Rfl:   •  golimumab (SIMPONI ARIA IV), Infuse into a venous catheter. Every other month, Disp: , Rfl:   •  methotrexate 2.5 mg tablet, Take  once a week, Disp: , Rfl:       History  Medical History:   Past Medical History:   Diagnosis Date   • Arthritis    • Edema    • Hypertension    • Lipid disorder        Surgical History:   Past Surgical History:   Procedure Laterality Date   • CARPAL TUNNEL RELEASE Bilateral    • EYE SURGERY     • NOSE SURGERY         Social History:   Social History     Socioeconomic History   • Marital status:   "    Spouse name: None   • Number of children: None   • Years of education: None   • Highest education level: None   Tobacco Use   • Smoking status: Former Smoker   • Smokeless tobacco: Never Used   Vaping Use   • Vaping Use: Never used   Substance and Sexual Activity   • Alcohol use: Not Currently   • Drug use: Never   • Sexual activity: Defer       Family History:   Family History   Problem Relation Age of Onset   • Heart disease Biological Mother          Review of Systems   14 point review of systems completed and otherwise negative unless noted above in the HPI      OBJECTIVE  Visit Vitals  Pulse 97   Ht 1.816 m (5' 11.5\")   Wt 127 kg (281 lb)   SpO2 (!) 70%   BMI 38.65 kg/m²     Weights (last 7 days)     Date/Time Weight    05/04/22 0942 127 kg (281 lb)            Physical Exam     Is overweight in no acute distress.  Weight is 281 pounds of further weight loss of 5 pounds since November.  Blood pressure is 120/80 right arm sitting.    Alert oriented x3.  Labs no icterus.  No conjunctival pallor.  Short thick neck.  Normal jugular venous pressure.  Normal carotid upstroke without bruits.  Normal thyroid.  No adenopathy.  Excellent pedal pulses.    Quiet precordium.  S1 and S2 are normal.  Grade 1/6 basal ejection systolic murmur.    Lungs clear.  Abdomen soft no megalies  no abnormal pulsations..  Easily reducible abdominal hernia.  Large right upper quadrant scar from partial colonic resection.    No rashes breakdown or malignancy of the skin.  No clubbing cyanosis or edema.  Appropriate affect judgment and insight.  No sensorimotor deficits.  CBC Results    No lab values to display.       CMP Results    No lab values to display.       PT PTT Results    No lab values to display.       Troponin I Results    No lab values to display.         Cardiology results  EKG:  Sinus rhythm.  Cannot exclude old anteroseptal infarct.    CTA of the coronaries on 3/24/2021 was technically difficult.  There was calcified " plaque in the left main and the proximal LAD and circumflex but without serous obstruction.  Calcium score was 837 with normal systolic function.        ASSESSMENT AND PLAN:    1.  Umbilical hernia.    2.  Obesity.  He has succeeded in losing a significant amount of weight.    3.  Hypertension.  Angioedema on ACE inhibitor's.  He tells me his blood pressure lately has had a few high recordings.  We should keep an eye on it.  He is receiving lately testosterone injections which may be participating factor.    4.  Coronary artery disease as above.  Continue vigorous risk factor modification especially with decreased weight and exercise statin and low-dose aspirin.    5.  Obstructive sleep apnea.    6.  Rheumatoid arthritis.    Overall he is doing rather well.  I will see him back in a year.          Deven Caban MD  5/4/2022  9:57 AM

## 2022-05-04 NOTE — LETTER
May 4, 2022     Kush Wen MD  [address] [facility] [address]    Patient: Clint Pappas  YOB: 1955  Date of Visit: 5/4/2022      Dear Dr. Wen:    Thank you for referring Clint Pappas to me for evaluation. Below are my notes for this consultation.    If you have questions, please do not hesitate to call me. I look forward to following your patient along with you.         Sincerely,        Deven Caban MD        CC: No Recipients  Deven Caban MD  5/4/2022 10:07 AM  Sign when Signing Visit    CARDIOLOGY OUTPATIENT NOTE      Subjective:  Mack comes for follow-up.  He had his knee replacement with excellent results.  He has no angina dyspnea dizziness syncope near syncope claudication or symptoms of cerebrovascular disease.  His rheumatoid arthritis is reasonably under control.      Allergies: Acetaminophen, Lisinopril, and Oxycodone    Medications    Current Outpatient Medications:   •  aspirin 81 mg enteric coated tablet, Take 81 mg by mouth daily., Disp: , Rfl:   •  atorvastatin (LIPITOR) 40 mg tablet, Take 40 mg by mouth nightly., Disp: , Rfl:   •  chlorthalidone (HYGROTEN) 25 mg tablet, Take 1 tablet (25 mg total) by mouth daily., Disp: 90 tablet, Rfl: 0  •  diclofenac (VOLTAREN) 50 mg EC tablet, Take 75 mg by mouth as needed. One and a half pills, Disp: , Rfl:   •  diclofenac-capsaicin 75 mg- 0.025 % kit, , Disp: , Rfl:   •  fluticasone propionate (FLONASE) 50 mcg/actuation nasal spray, fluticasone propionate 50 mcg/actuation nasal spray,suspension, Disp: , Rfl:   •  folic acid (FOLVITE) 1 mg tablet, Take 1 mg by mouth daily., Disp: , Rfl:   •  hydrOXYchloroQUINE (PLAQUENIL) 200 mg tablet, , Disp: , Rfl:   •  pantoprazole (PROTONIX) 40 mg EC tablet, Take 40 mg by mouth., Disp: , Rfl:   •  tamsulosin (FLOMAX) 0.4 mg capsule, Take 0.4 mg by mouth., Disp: , Rfl:   •  testosterone cypionate (DEPO-TESTOTERONE) 200 mg/mL injection, every 14 (fourteen) days., Disp:  ", Rfl:   •  golimumab (SIMPONI ARIA IV), Infuse into a venous catheter. Every other month, Disp: , Rfl:   •  methotrexate 2.5 mg tablet, Take  once a week, Disp: , Rfl:       History  Medical History:   Past Medical History:   Diagnosis Date   • Arthritis    • Edema    • Hypertension    • Lipid disorder        Surgical History:   Past Surgical History:   Procedure Laterality Date   • CARPAL TUNNEL RELEASE Bilateral    • EYE SURGERY     • NOSE SURGERY         Social History:   Social History     Socioeconomic History   • Marital status:      Spouse name: None   • Number of children: None   • Years of education: None   • Highest education level: None   Tobacco Use   • Smoking status: Former Smoker   • Smokeless tobacco: Never Used   Vaping Use   • Vaping Use: Never used   Substance and Sexual Activity   • Alcohol use: Not Currently   • Drug use: Never   • Sexual activity: Defer       Family History:   Family History   Problem Relation Age of Onset   • Heart disease Biological Mother          Review of Systems   14 point review of systems completed and otherwise negative unless noted above in the HPI      OBJECTIVE  Visit Vitals  Pulse 97   Ht 1.816 m (5' 11.5\")   Wt 127 kg (281 lb)   SpO2 (!) 70%   BMI 38.65 kg/m²     Weights (last 7 days)     Date/Time Weight    05/04/22 0942 127 kg (281 lb)            Physical Exam     Is overweight in no acute distress.  Weight is 281 pounds of further weight loss of 5 pounds since November.  Blood pressure is 120/80 right arm sitting.    Alert oriented x3.  Labs no icterus.  No conjunctival pallor.  Short thick neck.  Normal jugular venous pressure.  Normal carotid upstroke without bruits.  Normal thyroid.  No adenopathy.  Excellent pedal pulses.    Quiet precordium.  S1 and S2 are normal.  Grade 1/6 basal ejection systolic murmur.    Lungs clear.  Abdomen soft no megalies  no abnormal pulsations..  Easily reducible abdominal hernia.  Large right upper quadrant scar from " partial colonic resection.    No rashes breakdown or malignancy of the skin.  No clubbing cyanosis or edema.  Appropriate affect judgment and insight.  No sensorimotor deficits.  CBC Results    No lab values to display.       CMP Results    No lab values to display.       PT PTT Results    No lab values to display.       Troponin I Results    No lab values to display.         Cardiology results  EKG:  Sinus rhythm.  Cannot exclude old anteroseptal infarct.    CTA of the coronaries on 3/24/2021 was technically difficult.  There was calcified plaque in the left main and the proximal LAD and circumflex but without serous obstruction.  Calcium score was 837 with normal systolic function.        ASSESSMENT AND PLAN:    1.  Umbilical hernia.    2.  Obesity.  He has succeeded in losing a significant amount of weight.    3.  Hypertension.  Angioedema on ACE inhibitor's.  He tells me his blood pressure lately has had a few high recordings.  We should keep an eye on it.  He is receiving lately testosterone injections which may be participating factor.    4.  Coronary artery disease as above.  Continue vigorous risk factor modification especially with decreased weight and exercise statin and low-dose aspirin.    5.  Obstructive sleep apnea.    6.  Rheumatoid arthritis.    Overall he is doing rather well.  I will see him back in a year.          Deven Caban MD  5/4/2022  9:57 AM

## 2022-07-18 ENCOUNTER — TELEPHONE (OUTPATIENT)
Dept: SCHEDULING | Facility: CLINIC | Age: 67
End: 2022-07-18
Payer: MEDICARE

## 2022-07-18 NOTE — TELEPHONE ENCOUNTER
Transfer Care from Provider to Provider     Name of caller: Clint     Relationship to patient: Self     Current patient of doctor: Deven Caban MD     Requesting to transfer care to doctor: Dr. Bhat    Please call patient to advise: [phone]    Additional notes: Dr. Guerrero Leahy pt's rheumatologist recommended pt to see Dr. Bhat

## 2022-07-18 NOTE — TELEPHONE ENCOUNTER
MY:  Please see prior.  States he is to schedule OV with ,cardiology.  I am not sure where to send this information for patient to schedule.  Thank you.    MUKESH Corrales

## 2022-07-20 NOTE — TELEPHONE ENCOUNTER
Dr Bhat- New patient appt scheduled for 8-  (transfer of care from Dr Caban)    New Patient Visit Questionnaire  Use the F2 key to move through the asterisks.  Reason for appointment? Referred to you from Dr Masters, was in  Hospital- still having LE Edema    Who referred you: Dr Masters    Name of primary care physician Kush Salgado MD ([facility])    Has the patient ever been seen by a cardiologist before?  Yes Dr Caban             If YES, please obtain name and location of previous cardiologist.  Do you give us permission to obtain Medical records from the Providers listed? yes    Have you had any recent lab work performed? Yes while in the hospital     If yes, where was this performed?    Have you ever had any cardiac testing (echo, stress test, carotid or aortic ultrasounds, cardiac MRI, etc.) Had NST and Echo in 2021    Have you ever had a cardiac catheterization, angioplasty, stent or heart surgery?  NO    Have you had any recent hospitalizations?  YES was in [facility]    If the patient has had previous testing/hospitalization, please determine where and when this was performed.  If the patient has copies of these reports or discharge summaries, please instruct them bring records to the visit.     PATIENT INSTRUCTIONS   Please bring a list of all your medications with the name of the medication, the dosage and how frequently you take the medication.  If you do not have a list, please bring all of your medication bottles with you.

## 2022-07-20 NOTE — TELEPHONE ENCOUNTER
Please let Dr. Caban know-I am happy to see patient as long as Dr. Caban is okay with it.  If so, moni will contact patient to make an appointment in the [facility] or [facility] office.  For patients with long-term, multiple issues, [facility] office is preferred as I have more availability here.

## 2022-08-24 ENCOUNTER — OFFICE VISIT (OUTPATIENT)
Dept: CARDIOLOGY | Facility: CLINIC | Age: 67
End: 2022-08-24
Payer: MEDICARE

## 2022-08-24 VITALS
HEIGHT: 71 IN | SYSTOLIC BLOOD PRESSURE: 132 MMHG | RESPIRATION RATE: 16 BRPM | BODY MASS INDEX: 37.52 KG/M2 | WEIGHT: 268 LBS | HEART RATE: 71 BPM | DIASTOLIC BLOOD PRESSURE: 78 MMHG

## 2022-08-24 DIAGNOSIS — E78.5 HYPERLIPIDEMIA, UNSPECIFIED HYPERLIPIDEMIA TYPE: Primary | ICD-10-CM

## 2022-08-24 PROBLEM — I25.10 CORONARY ARTERY DISEASE INVOLVING NATIVE CORONARY ARTERY OF NATIVE HEART WITHOUT ANGINA PECTORIS: Status: ACTIVE | Noted: 2022-08-24

## 2022-08-24 PROBLEM — R60.0 LOWER EXTREMITY EDEMA: Status: ACTIVE | Noted: 2022-08-24

## 2022-08-24 PROBLEM — I10 PRIMARY HYPERTENSION: Status: ACTIVE | Noted: 2022-08-24

## 2022-08-24 PROBLEM — I48.0 PAF (PAROXYSMAL ATRIAL FIBRILLATION) (CMS/HCC): Status: ACTIVE | Noted: 2022-08-24

## 2022-08-24 PROCEDURE — 99215 OFFICE O/P EST HI 40 MIN: CPT | Performed by: INTERNAL MEDICINE

## 2022-08-24 PROCEDURE — 93000 ELECTROCARDIOGRAM COMPLETE: CPT | Performed by: INTERNAL MEDICINE

## 2022-08-24 PROCEDURE — G8754 DIAS BP LESS 90: HCPCS | Performed by: INTERNAL MEDICINE

## 2022-08-24 PROCEDURE — G8752 SYS BP LESS 140: HCPCS | Performed by: INTERNAL MEDICINE

## 2022-08-24 RX ORDER — OXYBUTYNIN CHLORIDE 15 MG/1
TABLET, EXTENDED RELEASE ORAL
COMMUNITY
Start: 2022-08-17

## 2022-08-24 RX ORDER — METOPROLOL TARTRATE 25 MG/1
12.5 TABLET, FILM COATED ORAL
COMMUNITY
Start: 2022-07-26 | End: 2024-03-01

## 2022-08-24 RX ORDER — DABIGATRAN ETEXILATE MESYLATE 150 MG/1
CAPSULE ORAL
COMMUNITY
Start: 2022-08-17 | End: 2023-04-26 | Stop reason: ALTCHOICE

## 2022-08-24 NOTE — ASSESSMENT & PLAN NOTE
CTA 4-2021 shows no high-grade lesions but visualization limited.  Low threshold for coronary angiogram if patient develops symptoms.  Was not continued on aspirin previously by cardiology as he is on Pradaxa.  Continue with Toprol tartrate 25 mg p.o. twice daily and atorvastatin 40 mg daily

## 2022-08-24 NOTE — ASSESSMENT & PLAN NOTE
Patient has lower extremity/pedal edema which is his main complaint.  He is concerned about this because he feels it is responsible for his foot and ankle discomfort.  Although it may contribute, I explained that his primary issue's may still be his rheumatoid arthritis and possible gout which was a concern during his hospitalization.     His echocardiogram at [facility] did not suggest increased left atrial pressures.  He has not had a BNP so that was ordered.  Ultimately I started a discussion to explain that volume status in his case may ultimately be defined best by right heart catheterization, although it is not currently recommended.  Current presentation is not suggestive of acute coronary syndrome or congestive heart failure.    I left a message for his rheumatologist-ultimately the patient has been limited in what medications he can take including diuretics because there has been a concern of increased liver function tests, acute renal insufficiency.  He was told not to take Voltaren by his rheumatologist.

## 2022-08-24 NOTE — LETTER
August 24, 2022     Guerrero Grigsby, DO  [address] [address]    Patient: Clint Pappas  YOB: 1955  Date of Visit: 8/24/2022      Dear Dr. Grigsby:    Thank you for referring Clint Pappas to me for evaluation. Below are my notes for this consultation.    If you have questions, please do not hesitate to call me. I look forward to following your patient along with you.         Sincerely,        Ajay Bhat MD        CC: MD Home Lara Riti, MD  8/24/2022  1:45 PM  Signed       Cardiology Outpatient Note    [facility]  [address] [place] [address]     [facility]  [facility]  [employer] [facility] [employer] [facility] Level  [address] [place] [address]     TEL  [phone]  [email or web address]     Clint Pappas is a 67 y.o. male patient here for cardiac evaluation.  The patient has seen cardiology at [facility] and Dr. Deven Caban at [facility].  He came here after speaking with his rheumatologist Dr. Masters.    Patient has a history of rheumatoid arthritis, hypertension, obstructive sleep apnea, BPH, who was admitted to [facility] 6-2022 with a urinary tract infection acute renal insufficiency.  However for him his main issue is lower extremity/ankle edema.  He did not have a DVT by ultrasound and had an echocardiogram at that time which showed normal left ventricular function.  He had paroxysmal atrial fibrillation at that time and was loaded with digoxin, reverted to sinus rhythm, digoxin discontinued, and started on Pradaxa.  Patient was then seen again in the emergency room and admitted 6- for pancreatitis.  He was evaluated by gastroenterology and they recommended discontinuation of methotrexate and thiazide diuretic as there may have been drug-induced liver injury with abnormal LFTs.    Patient was being evaluated for bariatric surgery and had an  abnormal stress test with Dr. Caban.  He was scheduled for a coronary CTA when at [facility] a  stress test showed inferior ischemia.  He did not want to proceed with cardiac catheterization at that time and appears to have seen Dr. Caban at [facility].  Dr. Caban reviewed the study with imaging cardiologist and patient was not felt to have obstructive coronary artery disease.    Patient is here today because he has discomfort throughout his body but particularly in his ankles and his feet.  He has lower extremity swelling which he is hoping to resolve.  In addition to the above history prior notes indicate the patient was on chlorthalidone which was discontinued because of acute renal injury.  Blood work from 7-2022 shows creatinine 0.71.  He was seen by cardiology in the office at [facility] 7- although the patient states that he has not gotten answers in terms of his lower extremity edema the note documents that this was discussed with the patient.  The edema was attributed to possibly coming off chlorthalidone, nonpharmacologic methods of treatment including leg elevation and sodium restriction were discussed with the patient.  They documented discussing diuretic use but he did not want to do so because of urinary frequency.  They ordered a BNP but it was not done with the results or not available to me.    I explained this all to the patient who has poor recollection of any of those events.  Currently denies chest pain shortness of breath or palpitations.  Has obstructive sleep apnea but his CPAP machine was recalled and he does not plan on using it again.                                                         PMH     Medical History:  Past Medical History:   Diagnosis Date   • Arthritis    • Atrial fibrillation (CMS/HCC)    • Edema    • Enlarged prostate    • Gastroesophageal reflux disease without esophagitis    • Hypertension    • Lipid disorder    • ABNER  (obstructive sleep apnea)        Surgical History:  Past Surgical History:   Procedure Laterality Date   • CARPAL TUNNEL RELEASE Bilateral    • COLECTOMY     • EYE SURGERY     • NOSE SURGERY         Social History:  Social History     Tobacco Use   • Smoking status: Former Smoker     Years: 15.00     Quit date:      Years since quittin.6   • Smokeless tobacco: Never Used   Vaping Use   • Vaping Use: Never used   Substance Use Topics   • Alcohol use: Not Currently   • Drug use: Never       Family History: He indicated that his biological mother is . He indicated that his biological father is . He indicated that the status of his biological brother is unknown. He indicated that his maternal grandmother is . He indicated that his maternal grandfather is .      Allergies:Acetaminophen, Lisinopril, and Oxycodone    Current Medications:    Outpatient Encounter Medications as of 2022:   •  atorvastatin (LIPITOR) 40 mg tablet, Take 40 mg by mouth nightly.  •  fluticasone propionate (FLONASE) 50 mcg/actuation nasal spray, fluticasone propionate 50 mcg/actuation nasal spray,suspension  •  folic acid (FOLVITE) 1 mg tablet, Take 1 mg by mouth daily.  •  hydrOXYchloroQUINE (PLAQUENIL) 200 mg tablet,   •  methotrexate 2.5 mg tablet, Take  once a week  •  metoprolol tartrate (LOPRESSOR) 25 mg tablet, Take 25 mg by mouth every 12 (twelve) hours.  •  oxybutynin XL (DITROPAN XL) 15 mg 24 hr tablet, TAKE ONE TABLET BY MOUTH IN THE MORNING AS DIRECTED  •  pantoprazole (PROTONIX) 40 mg EC tablet, Take 40 mg by mouth.  •  PRADAXA 150 mg capsu, Take 1 capsule (150 mg total) by mouth every 12 (twelve) hours.  •  tamsulosin (FLOMAX) 0.4 mg capsule, Take 0.4 mg by mouth.  •  testosterone cypionate (DEPO-TESTOTERONE) 200 mg/mL injection, every 14 (fourteen) days.  •  [DISCONTINUED] aspirin 81 mg enteric coated tablet, Take 81 mg by mouth daily.  •  [DISCONTINUED] chlorthalidone (HYGROTEN) 25 mg  "tablet, Take 1 tablet (25 mg total) by mouth daily.  •  [DISCONTINUED] diclofenac (VOLTAREN) 50 mg EC tablet, Take 75 mg by mouth as needed. One and a half pills  •  [DISCONTINUED] diclofenac-capsaicin 75 mg- 0.025 % kit,   •  [DISCONTINUED] golimumab (SIMPONI ARIA IV), Infuse into a venous catheter. Every other month                                                          OBJECTIVE   ROS as in HPI   Constitution: Negative for chills and fever.   Eyes: Negative for blurred vision and visual disturbance.   Cardiovascular: Negative for chest pain, dyspnea on exertion, near-syncope, palpitations and syncope.   Respiratory: Negative for hemoptysis, shortness of breath and wheezing.    Hematologic/Lymphatic: Negative for bleeding problem.   Skin: Negative for rash. No new skin changes  Gastrointestinal: Negative for abdominal pain, diarrhea, hematochezia, melena, nausea and vomiting.   Genitourinary: Negative for dysuria and hematuria.   Neurological: Negative for headaches.   Bilateral pedal/ankle edema   diffuse body aches and arthralgias         Vitals:    08/24/22 1004   BP: 132/78   BP Location: Left upper arm   Patient Position: Sitting   Pulse: 71   Resp: 16   Weight: 122 kg (268 lb)   Height: 1.803 m (5' 11\")       BP Readings from Last 3 Encounters:   08/24/22 132/78   04/27/21 (!) 156/63     Wt Readings from Last 3 Encounters:   08/24/22 122 kg (268 lb)   05/04/22 127 kg (281 lb)   11/04/21 130 kg (286 lb 1.6 oz)       Physical Exam   Constitutional: Appears comfortable in no distress  HEENT:  Neck Supple.  No JVD upright no carotid bruits   Head: Normocephalic.   Eyes: Extraocular movements intact  Cardiovascular: Normal rate, regular rhythm systolic murmur left sternal border Exam reveals no friction rub.    Pulmonary/Chest: Effort normal and breath sounds normal. No wheezes.  Abdominal: Soft and nontender. Bowel sounds are normal.   Musculoskeletal: Ankle edema bilaterally no calf tenderness  Neurological: " Alert and oriented to person, place, and time.   Skin: Skin is warm and dry.   Psychiatric: Behavior is normal.            Objective   No results found for: CHOL  No results found for: HDL  No results found for: LDLCALC  No results found for: TRIG  No results found for: ALT, AST  No results found for: WBC, HGB, HCT, PLT, INR  No results found for: GLUCOSE, NA, K, CO2, CL, BUN, CREATININE  No results found for: HGBA1C  No results found for: TSH  No results found for: BNP  [unfilled]    Troponin I Results    No lab values to display.       No results found for: HSTROPONINI                                                       IMAGING     Trans thoracic echocardiogram [facility] 6-    Conclusions:   Normal left ventricular size. Normal left ventricular wall thickness. Normal left   ventricular systolic function. LV Ejection Fraction was 55 %.     Findings:   Study Quality:   Technically Difficult. Intravenous contrast utilized to enhance endocardial visualization.   Left Ventricle:   Normal left ventricular size. Normal left ventricular wall thickness. Normal left   ventricular systolic function. LV Ejection Fraction was 55 %.   Diastolic Function:   Mitral valve inflow pattern and Tissue Doppler imaging within normal limits for age.   Right Ventricle:   Normal right ventricular systolic function. Normal right ventricular size. TAPSE 2.9 cm.   Left Atrium:   The left atrium is normal in size. Left atrial volume index 11 ml/m2.   Right Atrium:   The right atrium is normal in size. Right Atrial Volume Index 7 ml/m2.   Atrial Septum:   Not well visualized.   Mitral Valve:   Mild mitral annular calcification. Mitral valve leaflets appear mildly thickened. Trivial   mitral regurgitation.   Aortic Valve:   Trileaflet aortic valve. No aortic regurgitation. No hemodynamically significant aortic   stenosis.   Tricuspid Valve:   Normal appearance of the tricuspid valve. There is trivial tricuspid regurgitation. RV    systolic pressure cannot be determined due to lack of a tricuspid regurgitation Doppler   signal.   Pulmonic Valve:   Normal appearance of the pulmonic valve.   Pericardium:   Normal pericardium with no significant pericardial effusion.   Aorta:   Normal-sized aortic root and ascending aorta.   IVC:   Normal size IVC with respiratory collapse consistent with a right atrial pressure of 3   mmHg. IVC diameter 1.6 cm.        Coronary CTA 4-  1.  Technically difficult study.  Significant artifact affects interpretation.  Calcified plaque seen in the left main as well as the proximal left anterior  descending and circumflex.  The vessels appear most likely patent.  However,  severity of any stenosis could not be definitively determined due to significant  artifact.  Clinical correlation suggested.  Distal vessels not well-visualized.    LEFT MAIN: Moderate amount of circumferential calcified plaque seen.  There  appears to be a mild (30-50%) associated stenosis.  However, due to artifact,  the exact severity of stenosis could not be determined..  The vessel appears  most likely patent.     LAD:  Proximal: Diffuse calcified plaque seen.  Severity of stenosis could not be  determined due to artifact..  The vessel appears most likely patent.  Mid: Diffuse calcified plaque seen.  Severity of stenosis cannot be determined  due to artifact..  The vessel appears most likely patent.  Distal: Not well seen..  DIAG 1: Not well seen..  DIAG 2: Not well seen..     LCX:  Proximal: Diffuse calcified plaque seen.  The severity of stenosis could not be  determined due to artifact.  The vessel appears most likely patent.  Mid: Calcified plaque seen.  Severity of stenosis cannot be determined due to  artifact..  The vessel appears most likely patent.  Distal: Not well seen.  OM1:  Not well seen.  LPLB: Not well seen.  LPDA: Not well seen.     RCA:  Right coronary artery not well-visualized due to motion artifact..  No  calcified  plaque seen.  Small nondominant vessel.     2.  Cardiac Structures: Normal.  3.  Normal left ventricular systolic function.  4.  Coronary calcium score 837.  This places the patient in the DIAZ 85th risk  percentile for age and gender matched individuals.  5.  Overall quality of the scan was poor.         EKG 8/24/2022   Sinus  Rhythm  71bpm [id number]  - PVC   -Poor R-wave progression                                                    ASSESSMENT AND PLAN   Mr. Pappas is a 67-year-old gentleman with the following issues:    Assessment/Plan    Lower extremity edema  Patient has lower extremity/pedal edema which is his main complaint.  He is concerned about this because he feels it is responsible for his foot and ankle discomfort.  Although it may contribute, I explained that his primary issue's may still be his rheumatoid arthritis and possible gout which was a concern during his hospitalization.     His echocardiogram at [facility] did not suggest increased left atrial pressures.  He has not had a BNP so that was ordered.  Ultimately I started a discussion to explain that volume status in his case may ultimately be defined best by right heart catheterization, although it is not currently recommended.  Current presentation is not suggestive of acute coronary syndrome or congestive heart failure.    I left a message for his rheumatologist-ultimately the patient has been limited in what medications he can take including diuretics because there has been a concern of increased liver function tests, acute renal insufficiency.  He was told not to take Voltaren by his rheumatologist.        PAF (paroxysmal atrial fibrillation) (CMS/HCC)  Currently in sinus rhythm.  Continue with Pradaxa.  Denies any abnormal bleeding.  This was also explained to him-that because of his high risk profile and CHADS2 score anticoagulation is recommended indefinitely    Primary hypertension  Continue with current  medication    Coronary artery disease involving native coronary artery of native heart without angina pectoris  CTA 4-2021 shows no high-grade lesions but visualization limited.  Low threshold for coronary angiogram if patient develops symptoms.  Was not continued on aspirin previously by cardiology as he is on Pradaxa.  Continue with Toprol tartrate 25 mg p.o. twice daily and atorvastatin 40 mg daily    Hyperlipidemia  Atorvastatin 40 mg daily              Return in about 4 weeks (around 9/21/2022).    I personally spent total 44 minutes face to face with the patient in counseling, review of records and discussion and/or coordination of care as described above.        Thank you for allowing me to participate in the care of this patient.  I hope this information is helpful.         Ajay Bhat MD [facility]  8/24/2022  1:43 PM    This document was generated utilizing voice recognition technology. A reasonable attempt at proofreading has been made to minimize errors but please excuse any typographical errors which may be present. Please call with any questions.

## 2022-08-24 NOTE — ASSESSMENT & PLAN NOTE
Currently in sinus rhythm.  Continue with Pradaxa.  Denies any abnormal bleeding.  This was also explained to him-that because of his high risk profile and CHADS2 score anticoagulation is recommended indefinitely

## 2022-08-24 NOTE — PROGRESS NOTES
Cardiology Outpatient Note    [facility] HEART [facility] Office  [address] [place] [address]     [facility]  The [facility] [address]  [address] [place] [address]     TEL  [phone]  [facility]     Clint Pappas is a 67 y.o. male patient here for cardiac evaluation.  The patient has seen cardiology at [facility] and Dr. Deven Caban at [facility].  He came here after speaking with his rheumatologist Dr. Masters.    Patient has a history of rheumatoid arthritis, hypertension, obstructive sleep apnea, BPH, who was admitted to [facility] 6-2022 with a urinary tract infection acute renal insufficiency.  However for him his main issue is lower extremity/ankle edema.  He did not have a DVT by ultrasound and had an echocardiogram at that time which showed normal left ventricular function.  He had paroxysmal atrial fibrillation at that time and was loaded with digoxin, reverted to sinus rhythm, digoxin discontinued, and started on Pradaxa.  Patient was then seen again in the emergency room and admitted 6- for pancreatitis.  He was evaluated by gastroenterology and they recommended discontinuation of methotrexate and thiazide diuretic as there may have been drug-induced liver injury with abnormal LFTs.    Patient was being evaluated for bariatric surgery and had an abnormal stress test with Dr. Caban.  He was scheduled for a coronary CTA when at [facility] a  stress test showed inferior ischemia.  He did not want to proceed with cardiac catheterization at that time and appears to have seen Dr. Caban at [facility].  Dr. Caban reviewed the study with imaging cardiologist and patient was not felt to have obstructive coronary artery disease.    Patient is here today because he has discomfort throughout his body but particularly in his ankles and his feet.  He has lower extremity swelling  which he is hoping to resolve.  In addition to the above history prior notes indicate the patient was on chlorthalidone which was discontinued because of acute renal injury.  Blood work from  shows creatinine 0.71.  He was seen by cardiology in the office at [facility] 2022 although the patient states that he has not gotten answers in terms of his lower extremity edema the note documents that this was discussed with the patient.  The edema was attributed to possibly coming off chlorthalidone, nonpharmacologic methods of treatment including leg elevation and sodium restriction were discussed with the patient.  They documented discussing diuretic use but he did not want to do so because of urinary frequency.  They ordered a BNP but it was not done with the results or not available to me.    I explained this all to the patient who has poor recollection of any of those events.  Currently denies chest pain shortness of breath or palpitations.  Has obstructive sleep apnea but his CPAP machine was recalled and he does not plan on using it again.                                                         [facility]     Medical History:  Past Medical History:   Diagnosis Date   • Arthritis    • Atrial fibrillation (CMS/HCC)    • Edema    • Enlarged prostate    • Gastroesophageal reflux disease without esophagitis    • Hypertension    • Lipid disorder    • ABNER (obstructive sleep apnea)        Surgical History:  Past Surgical History:   Procedure Laterality Date   • CARPAL TUNNEL RELEASE Bilateral    • COLECTOMY     • EYE SURGERY     • NOSE SURGERY         Social History:  Social History     Tobacco Use   • Smoking status: Former Smoker     Years: 15.00     Quit date:      Years since quittin.6   • Smokeless tobacco: Never Used   Vaping Use   • Vaping Use: Never used   Substance Use Topics   • Alcohol use: Not Currently   • Drug use: Never       Family History: He indicated that his biological mother is  . He indicated that his biological father is . He indicated that the status of his biological brother is unknown. He indicated that his maternal grandmother is . He indicated that his maternal grandfather is .      Allergies:Acetaminophen, Lisinopril, and Oxycodone    Current Medications:    Outpatient Encounter Medications as of 2022:   •  atorvastatin (LIPITOR) 40 mg tablet, Take 40 mg by mouth nightly.  •  fluticasone propionate (FLONASE) 50 mcg/actuation nasal spray, fluticasone propionate 50 mcg/actuation nasal spray,suspension  •  folic acid (FOLVITE) 1 mg tablet, Take 1 mg by mouth daily.  •  hydrOXYchloroQUINE (PLAQUENIL) 200 mg tablet,   •  methotrexate 2.5 mg tablet, Take  once a week  •  metoprolol tartrate (LOPRESSOR) 25 mg tablet, Take 25 mg by mouth every 12 (twelve) hours.  •  oxybutynin XL (DITROPAN XL) 15 mg 24 hr tablet, TAKE ONE TABLET BY MOUTH IN THE MORNING AS DIRECTED  •  pantoprazole (PROTONIX) 40 mg EC tablet, Take 40 mg by mouth.  •  PRADAXA 150 mg capsu, Take 1 capsule (150 mg total) by mouth every 12 (twelve) hours.  •  tamsulosin (FLOMAX) 0.4 mg capsule, Take 0.4 mg by mouth.  •  testosterone cypionate (DEPO-TESTOTERONE) 200 mg/mL injection, every 14 (fourteen) days.  •  [DISCONTINUED] aspirin 81 mg enteric coated tablet, Take 81 mg by mouth daily.  •  [DISCONTINUED] chlorthalidone (HYGROTEN) 25 mg tablet, Take 1 tablet (25 mg total) by mouth daily.  •  [DISCONTINUED] diclofenac (VOLTAREN) 50 mg EC tablet, Take 75 mg by mouth as needed. One and a half pills  •  [DISCONTINUED] diclofenac-capsaicin 75 mg- 0.025 % kit,   •  [DISCONTINUED] golimumab (SIMPONI ARIA IV), Infuse into a venous catheter. Every other month                                                          OBJECTIVE   ROS as in HPI   Constitution: Negative for chills and fever.   Eyes: Negative for blurred vision and visual disturbance.   Cardiovascular: Negative for chest pain, dyspnea on  "exertion, near-syncope, palpitations and syncope.   Respiratory: Negative for hemoptysis, shortness of breath and wheezing.    Hematologic/Lymphatic: Negative for bleeding problem.   Skin: Negative for rash. No new skin changes  Gastrointestinal: Negative for abdominal pain, diarrhea, hematochezia, melena, nausea and vomiting.   Genitourinary: Negative for dysuria and hematuria.   Neurological: Negative for headaches.   Bilateral pedal/ankle edema   diffuse body aches and arthralgias         Vitals:    08/24/22 1004   BP: 132/78   BP Location: Left upper arm   Patient Position: Sitting   Pulse: 71   Resp: 16   Weight: 122 kg (268 lb)   Height: 1.803 m (5' 11\")       BP Readings from Last 3 Encounters:   08/24/22 132/78   04/27/21 (!) 156/63     Wt Readings from Last 3 Encounters:   08/24/22 122 kg (268 lb)   05/04/22 127 kg (281 lb)   11/04/21 130 kg (286 lb 1.6 oz)       Physical Exam   Constitutional: Appears comfortable in no distress  HEENT:  Neck Supple.  No JVD upright no carotid bruits   Head: Normocephalic.   Eyes: Extraocular movements intact  Cardiovascular: Normal rate, regular rhythm systolic murmur left sternal border Exam reveals no friction rub.    Pulmonary/Chest: Effort normal and breath sounds normal. No wheezes.  Abdominal: Soft and nontender. Bowel sounds are normal.   Musculoskeletal: Ankle edema bilaterally no calf tenderness  Neurological: Alert and oriented to person, place, and time.   Skin: Skin is warm and dry.   Psychiatric: Behavior is normal.            Objective   No results found for: CHOL  No results found for: HDL  No results found for: LDLCALC  No results found for: TRIG  No results found for: ALT, AST  No results found for: WBC, HGB, HCT, PLT, INR  No results found for: GLUCOSE, NA, K, CO2, CL, BUN, CREATININE  No results found for: HGBA1C  No results found for: TSH  No results found for: BNP  [unfilled]    Troponin I Results    No lab values to display.       [facility] results found for: " [facility]     [facility]    Conclusions:   Normal left ventricular size. Normal left ventricular wall thickness. Normal left   ventricular systolic function. LV Ejection Fraction was 55 %.     Findings:   Study Quality:   Technically Difficult. Intravenous contrast utilized to enhance endocardial visualization.   Left Ventricle:   Normal left ventricular size. Normal left ventricular wall thickness. Normal left   ventricular systolic function. LV Ejection Fraction was 55 %.   Diastolic Function:   Mitral valve inflow pattern and Tissue Doppler imaging within normal limits for age.   Right Ventricle:   Normal right ventricular systolic function. Normal right ventricular size. TAPSE 2.9 cm.   Left Atrium:   The left atrium is normal in size. Left atrial volume index 11 ml/m2.   Right Atrium:   The right atrium is normal in size. Right Atrial Volume Index 7 ml/m2.   Atrial Septum:   Not well visualized.   Mitral Valve:   Mild mitral annular calcification. Mitral valve leaflets appear mildly thickened. Trivial   mitral regurgitation.   Aortic Valve:   Trileaflet aortic valve. No aortic regurgitation. No hemodynamically significant aortic   stenosis.   Tricuspid Valve:   Normal appearance of the tricuspid valve. There is trivial tricuspid regurgitation. RV   systolic pressure cannot be determined due to lack of a tricuspid regurgitation Doppler   signal.   Pulmonic Valve:   Normal appearance of the pulmonic valve.   Pericardium:   Normal pericardium with no significant pericardial effusion.   Aorta:   Normal-sized aortic root and ascending aorta.   IVC:   Normal size IVC with respiratory collapse consistent with a right atrial pressure of 3   mmHg. IVC diameter 1.6 cm.        Coronary CTA 4-  1.  Technically difficult study.  Significant artifact affects interpretation.  Calcified plaque seen in the left main  as well as the proximal left anterior  descending and circumflex.  The vessels appear most likely patent.  However,  severity of any stenosis could not be definitively determined due to significant  artifact.  Clinical correlation suggested.  Distal vessels not well-visualized.    LEFT MAIN: Moderate amount of circumferential calcified plaque seen.  There  appears to be a mild (30-50%) associated stenosis.  However, due to artifact,  the exact severity of stenosis could not be determined..  The vessel appears  most likely patent.     LAD:  Proximal: Diffuse calcified plaque seen.  Severity of stenosis could not be  determined due to artifact..  The vessel appears most likely patent.  Mid: Diffuse calcified plaque seen.  Severity of stenosis cannot be determined  due to artifact..  The vessel appears most likely patent.  Distal: Not well seen..  DIAG 1: Not well seen..  DIAG 2: Not well seen..     LCX:  Proximal: Diffuse calcified plaque seen.  The severity of stenosis could not be  determined due to artifact.  The vessel appears most likely patent.  Mid: Calcified plaque seen.  Severity of stenosis cannot be determined due to  artifact..  The vessel appears most likely patent.  Distal: Not well seen.  OM1:  Not well seen.  LPLB: Not well seen.  LPDA: Not well seen.     RCA:  Right coronary artery not well-visualized due to motion artifact..  No calcified  plaque seen.  Small nondominant vessel.     2.  Cardiac Structures: Normal.  3.  Normal left ventricular systolic function.  4.  Coronary calcium score 837.  This places the patient in the DIAZ 85th risk  percentile for age and gender matched individuals.  5.  Overall quality of the scan was poor.         EKG 8/24/2022   Sinus  Rhythm  71bpm [id number]  - PVC   -Poor R-wave progression                                                    ASSESSMENT AND PLAN   Mr. Pappas is a 67-year-old gentleman with the following issues:    Assessment/Plan    Lower extremity  edema  Patient has lower extremity/pedal edema which is his main complaint.  He is concerned about this because he feels it is responsible for his foot and ankle discomfort.  Although it may contribute, I explained that his primary issue's may still be his rheumatoid arthritis and possible gout which was a concern during his hospitalization.     His echocardiogram at [facility] did not suggest increased left atrial pressures.  He has not had a BNP so that was ordered.  Ultimately I started a discussion to explain that volume status in his case may ultimately be defined best by right heart catheterization, although it is not currently recommended.  Current presentation is not suggestive of acute coronary syndrome or congestive heart failure.    I left a message for his rheumatologist-ultimately the patient has been limited in what medications he can take including diuretics because there has been a concern of increased liver function tests, acute renal insufficiency.  He was told not to take Voltaren by his rheumatologist.        PAF (paroxysmal atrial fibrillation) (CMS/[facility])  Currently in sinus rhythm.  Continue with Pradaxa.  Denies any abnormal bleeding.  This was also explained to him-that because of his high risk profile and CHADS2 score anticoagulation is recommended indefinitely    Primary hypertension  Continue with current medication    Coronary artery disease involving native coronary artery of native heart without angina pectoris  CTA 4-2021 shows no high-grade lesions but visualization limited.  Low threshold for coronary angiogram if patient develops symptoms.  Was not continued on aspirin previously by cardiology as he is on Pradaxa.  Continue with Toprol tartrate 25 mg p.o. twice daily and atorvastatin 40 mg daily    Hyperlipidemia  Atorvastatin 40 mg daily              Return in about 4 weeks (around 9/21/2022).    I personally spent total 44 minutes face to face with the patient in  counseling, review of records and discussion and/or coordination of care as described above.        Thank you for allowing me to participate in the care of this patient.  I hope this information is helpful.         Ajay Bhat MD [facility]  8/24/2022  1:43 PM    This document was generated utilizing voice recognition technology. A reasonable attempt at proofreading has been made to minimize errors but please excuse any typographical errors which may be present. Please call with any questions.

## 2022-09-01 LAB
ALT SERPL-CCNC: 10 U/L (ref 9–46)
AST SERPL-CCNC: 11 U/L (ref 10–35)
BASOPHILS # BLD AUTO: 42 CELLS/UL (ref 0–200)
BASOPHILS NFR BLD AUTO: 0.5 %
BNP SERPL-MCNC: 277 PG/ML
BUN SERPL-MCNC: 9 MG/DL (ref 7–25)
BUN/CREAT SERPL: ABNORMAL (CALC) (ref 6–22)
CALCIUM SERPL-MCNC: 9 MG/DL (ref 8.6–10.3)
CHLORIDE SERPL-SCNC: 106 MMOL/L (ref 98–110)
CHOLEST SERPL-MCNC: 101 MG/DL
CHOLEST/HDLC SERPL: 3.1 (CALC)
CO2 SERPL-SCNC: 26 MMOL/L (ref 20–32)
CREAT SERPL-MCNC: 0.79 MG/DL (ref 0.7–1.35)
EGFRCR SERPLBLD CKD-EPI 2021: 96 ML/MIN/1.73M2
EOSINOPHIL # BLD AUTO: 202 CELLS/UL (ref 15–500)
EOSINOPHIL NFR BLD AUTO: 2.4 %
ERYTHROCYTE [DISTWIDTH] IN BLOOD BY AUTOMATED COUNT: 15.2 % (ref 11–15)
GLUCOSE SERPL-MCNC: 160 MG/DL (ref 65–139)
HCT VFR BLD AUTO: 32.8 % (ref 38.5–50)
HDLC SERPL-MCNC: 33 MG/DL
HGB BLD-MCNC: 10 G/DL (ref 13.2–17.1)
LDLC SERPL CALC-MCNC: 52 MG/DL (CALC)
LYMPHOCYTES # BLD AUTO: 2755 CELLS/UL (ref 850–3900)
LYMPHOCYTES NFR BLD AUTO: 32.8 %
MCH RBC QN AUTO: 26.3 PG (ref 27–33)
MCHC RBC AUTO-ENTMCNC: 30.5 G/DL (ref 32–36)
MCV RBC AUTO: 86.3 FL (ref 80–100)
MONOCYTES # BLD AUTO: 319 CELLS/UL (ref 200–950)
MONOCYTES NFR BLD AUTO: 3.8 %
NEUTROPHILS # BLD AUTO: 5082 CELLS/UL (ref 1500–7800)
NEUTROPHILS NFR BLD AUTO: 60.5 %
NONHDLC SERPL-MCNC: 68 MG/DL (CALC)
PLATELET # BLD AUTO: 458 THOUSAND/UL (ref 140–400)
PMV BLD REES-ECKER: 9.8 FL (ref 7.5–12.5)
POTASSIUM SERPL-SCNC: 4.8 MMOL/L (ref 3.5–5.3)
RBC # BLD AUTO: 3.8 MILLION/UL (ref 4.2–5.8)
SODIUM SERPL-SCNC: 139 MMOL/L (ref 135–146)
TRIGL SERPL-MCNC: 81 MG/DL
TSH SERPL-ACNC: 0.5 MIU/L (ref 0.4–4.5)
WBC # BLD AUTO: 8.4 THOUSAND/UL (ref 3.8–10.8)

## 2022-09-23 ENCOUNTER — OFFICE VISIT (OUTPATIENT)
Dept: CARDIOLOGY | Facility: CLINIC | Age: 67
End: 2022-09-23
Payer: MEDICARE

## 2022-09-23 VITALS
DIASTOLIC BLOOD PRESSURE: 60 MMHG | WEIGHT: 269.2 LBS | BODY MASS INDEX: 37.69 KG/M2 | HEART RATE: 69 BPM | HEIGHT: 71 IN | RESPIRATION RATE: 16 BRPM | SYSTOLIC BLOOD PRESSURE: 102 MMHG

## 2022-09-23 DIAGNOSIS — I48.0 PAF (PAROXYSMAL ATRIAL FIBRILLATION) (CMS/HCC): Primary | ICD-10-CM

## 2022-09-23 PROCEDURE — G8754 DIAS BP LESS 90: HCPCS | Performed by: INTERNAL MEDICINE

## 2022-09-23 PROCEDURE — 99214 OFFICE O/P EST MOD 30 MIN: CPT | Performed by: INTERNAL MEDICINE

## 2022-09-23 PROCEDURE — 93000 ELECTROCARDIOGRAM COMPLETE: CPT | Performed by: INTERNAL MEDICINE

## 2022-09-23 PROCEDURE — G8752 SYS BP LESS 140: HCPCS | Performed by: INTERNAL MEDICINE

## 2022-09-23 RX ORDER — INFLIXIMAB 100 MG/10ML
INJECTION, POWDER, LYOPHILIZED, FOR SOLUTION INTRAVENOUS
COMMUNITY

## 2022-09-23 RX ORDER — FUROSEMIDE 20 MG/1
20 TABLET ORAL
COMMUNITY
Start: 2022-09-09 | End: 2022-09-23

## 2022-09-23 RX ORDER — FUROSEMIDE 20 MG/1
20 TABLET ORAL 3 TIMES WEEKLY
Qty: 36 TABLET | Refills: 1
Start: 2022-09-23 | End: 2024-05-15

## 2022-09-23 ASSESSMENT — CHADS2 SCORE
PRIOR STROKE OR TIA OR THROMBOEMBOLISM: NO
AGE: 65-74 (+1PT.)
SEX: MALE
VASCULAR DISEASE: NO
HYPERTENSION: YES (+1 PT.)
DIABETES: NO

## 2022-09-23 NOTE — LETTER
September 23, 2022     Kush Wen MD  [address] [facility] [address]    Patient: Clint Pappas  YOB: 1955  Date of Visit: 9/23/2022      Dear Dr. Wen:    Thank you for referring Clint Pappas to me for evaluation. Below are my notes for this consultation.    If you have questions, please do not hesitate to call me. I look forward to following your patient along with you.         Sincerely,        Ajay Bhat MD        CC: No Recipients  Ajay Bhat MD  9/23/2022  9:46 AM  Signed       Cardiology Outpatient Note    [facility] Office  [address] [place] [address]     [facility]  [facility]  [facility] Level  [address] [place] [address]     TEL  [phone]  [facility]     Clint Pappas is a 67 y.o. male patient here for cardiac evaluation.  The patient has seen cardiology at [facility] and Dr. Deven Caban at [facility].  He came here after speaking with his rheumatologist Dr. Masters.    Patient has a history of rheumatoid arthritis, hypertension, obstructive sleep apnea, BPH, who was admitted to [facility] 6-2022 with a urinary tract infection acute renal insufficiency.  However for him his main issue is lower extremity/ankle edema.  He did not have a DVT by ultrasound and had an echocardiogram at that time which showed normal left ventricular function.  He had paroxysmal atrial fibrillation at that time and was loaded with digoxin, reverted to sinus rhythm, digoxin discontinued, and started on Pradaxa.  Patient was then seen again in the emergency room and admitted 6- for pancreatitis.  He was evaluated by gastroenterology and they recommended discontinuation of methotrexate and thiazide diuretic as there may have been drug-induced liver injury with abnormal LFTs.    Patient was being evaluated for bariatric surgery and had an abnormal stress test  with Dr. Caban.  He was scheduled for a coronary CTA when at [facility] a  stress test showed inferior ischemia.  He did not want to proceed with cardiac catheterization at that time and appears to have seen Dr. Caban at [facility].  Dr. Caban reviewed the study with imaging cardiologist and patient was not felt to have obstructive coronary artery disease.    Patient is here today because he has discomfort throughout his body but particularly in his ankles and his feet.  He has lower extremity swelling which he is hoping to resolve.  In addition to the above history prior notes indicate the patient was on chlorthalidone which was discontinued because of acute renal injury.  Blood work from 7-2022 shows creatinine 0.71.      He was started on furosemide and says his lower extremity edema has improved.  BNP was slightly elevated to 77.  No shortness of breath chest pain or palpitations.  Repeat creatinine is 0.79.    Overall he is feeling well and his feet feel better.  However he does notice increased discomfort with changes in weather.  His metoprolol dose was decreased to 12.5 p.o. twice daily because he was having some lightheadedness which has improved.  He says he is eating and drinking well.  Has obstructive sleep apnea but his CPAP machine was recalled and he does not plan on using it again.                                                         PMH     Medical History:  Past Medical History:   Diagnosis Date    Arthritis     Atrial fibrillation (CMS/HCC)     Edema     Enlarged prostate     Gastroesophageal reflux disease without esophagitis     Hypertension     Lipid disorder     ABNER (obstructive sleep apnea)        Surgical History:  Past Surgical History:   Procedure Laterality Date    CARPAL TUNNEL RELEASE Bilateral     COLECTOMY      EYE SURGERY      NOSE SURGERY         Social History:  Social History     Tobacco Use    Smoking status: Former Smoker     Years: 15.00      Quit date:      Years since quittin.7    Smokeless tobacco: Never Used   Vaping Use    Vaping Use: Never used   Substance Use Topics    Alcohol use: Not Currently    Drug use: Never       Family History: He indicated that his biological mother is . He indicated that his biological father is . He indicated that the status of his biological brother is unknown. He indicated that his maternal grandmother is . He indicated that his maternal grandfather is .      Allergies:Acetaminophen, Lisinopril, and Oxycodone    Current Medications:    Outpatient Encounter Medications as of 2022:     atorvastatin (LIPITOR) 40 mg tablet, Take 40 mg by mouth nightly.    fluticasone propionate (FLONASE) 50 mcg/actuation nasal spray, fluticasone propionate 50 mcg/actuation nasal spray,suspension    folic acid (FOLVITE) 1 mg tablet, Take 1 mg by mouth daily.    furosemide (LASIX) 20 mg tablet, Take 1 tablet (20 mg total) by mouth 3 (three) times a week (Mon, Wed, Fri).    hydrOXYchloroQUINE (PLAQUENIL) 200 mg tablet, Take 400 mg by mouth daily.    inFLIXimab (REMICADE) 100 mg injection, Infuse into a venous catheter.    METHOTREXATE SODIUM ORAL, once a week    metoprolol tartrate (LOPRESSOR) 25 mg tablet, Take 12.5 mg by mouth every 12 (twelve) hours.    oxybutynin XL (DITROPAN XL) 15 mg 24 hr tablet, TAKE ONE TABLET BY MOUTH IN THE MORNING AS DIRECTED    pantoprazole (PROTONIX) 40 mg EC tablet, Take 40 mg by mouth.    PRADAXA 150 mg capsu, Take 1 capsule (150 mg total) by mouth every 12 (twelve) hours.    tamsulosin (FLOMAX) 0.4 mg capsule, Take 0.4 mg by mouth.    testosterone cypionate (DEPO-TESTOTERONE) 200 mg/mL injection, every 14 (fourteen) days.    [DISCONTINUED] furosemide (LASIX) 20 mg tablet, Take 20 mg by mouth once daily. as directed                                                          OBJECTIVE   ROS as in HPI   Constitution: Negative for chills and  "fever.   Eyes: Negative for blurred vision and visual disturbance.   Cardiovascular: Negative for chest pain, dyspnea on exertion, near-syncope, palpitations and syncope.   Respiratory: Negative for hemoptysis, shortness of breath and wheezing.    Hematologic/Lymphatic: Negative for bleeding problem.   Skin: Negative for rash. No new skin changes  Gastrointestinal: Negative for abdominal pain, diarrhea, hematochezia, melena, nausea and vomiting.   Genitourinary: Negative for dysuria and hematuria.   Neurological: Negative for headaches.   Bilateral pedal/ankle edema improved   diffuse body aches and arthralgias         Vitals:    09/23/22 0915   BP: 102/60   BP Location: Left upper arm   Patient Position: Sitting   Pulse: 69   Resp: 16   Weight: 122 kg (269 lb 3.2 oz)   Height: 1.803 m (5' 11\")       BP Readings from Last 3 Encounters:   09/23/22 102/60   08/24/22 132/78   04/27/21 (!) 156/63     Wt Readings from Last 3 Encounters:   09/23/22 122 kg (269 lb 3.2 oz)   08/24/22 122 kg (268 lb)   05/04/22 127 kg (281 lb)       Physical Exam   Constitutional: Appears comfortable in no distress  HEENT:  Neck Supple.  No JVD upright no carotid bruits   Head: Normocephalic.   Eyes: Extraocular movements intact  Cardiovascular: Normal rate, regular rhythm 1 out of 6 systolic murmur left sternal border Exam reveals no friction rub.    Pulmonary/Chest: Effort normal and breath sounds normal. No wheezes.  Abdominal: Soft and nontender. Bowel sounds are normal.   Musculoskeletal: Trace ankle edema right greater than left  Neurological: Alert and oriented to person, place, and time.   Skin: Skin is warm and dry.   Psychiatric: Behavior is normal.            Objective   Lab Results   Component Value Date    CHOL 101 08/31/2022     Lab Results   Component Value Date    HDL 33 (L) 08/31/2022     Lab Results   Component Value Date    LDLCALC 52 08/31/2022     Lab Results   Component Value Date    TRIG 81 08/31/2022     Lab Results "   Component Value Date    ALT 10 08/31/2022    AST 11 08/31/2022     Lab Results   Component Value Date    WBC 8.4 08/31/2022    HGB 10.0 (L) 08/31/2022    HCT 32.8 (L) 08/31/2022     (H) 08/31/2022     Lab Results   Component Value Date    GLUCOSE 160 (H) 08/31/2022     08/31/2022    K 4.8 08/31/2022    CO2 26 08/31/2022     08/31/2022    BUN 9 08/31/2022    CREATININE 0.79 08/31/2022     No results found for: HGBA1C  Lab Results   Component Value Date    TSH 0.50 08/31/2022     Lab Results   Component Value Date     (H) 08/31/2022     [unfilled]    Troponin I Results    No lab values to display.       No results found for: HSTROPONINI                                                       IMAGING     Trans thoracic echocardiogram [facility] 6-    Conclusions:   Normal left ventricular size. Normal left ventricular wall thickness. Normal left   ventricular systolic function. LV Ejection Fraction was 55 %.     Findings:   Study Quality:   Technically Difficult. Intravenous contrast utilized to enhance endocardial visualization.   Left Ventricle:   Normal left ventricular size. Normal left ventricular wall thickness. Normal left   ventricular systolic function. LV Ejection Fraction was 55 %.   Diastolic Function:   Mitral valve inflow pattern and Tissue Doppler imaging within normal limits for age.   Right Ventricle:   Normal right ventricular systolic function. Normal right ventricular size. TAPSE 2.9 cm.   Left Atrium:   The left atrium is normal in size. Left atrial volume index 11 ml/m2.   Right Atrium:   The right atrium is normal in size. Right Atrial Volume Index 7 ml/m2.   Atrial Septum:   Not well visualized.   Mitral Valve:   Mild mitral annular calcification. Mitral valve leaflets appear mildly thickened. Trivial   mitral regurgitation.   Aortic Valve:   Trileaflet aortic valve. No aortic regurgitation. No hemodynamically significant aortic   stenosis.   Tricuspid Valve:    Normal appearance of the tricuspid valve. There is trivial tricuspid regurgitation. RV   systolic pressure cannot be determined due to lack of a tricuspid regurgitation Doppler   signal.   Pulmonic Valve:   Normal appearance of the pulmonic valve.   Pericardium:   Normal pericardium with no significant pericardial effusion.   Aorta:   Normal-sized aortic root and ascending aorta.   IVC:   Normal size IVC with respiratory collapse consistent with a right atrial pressure of 3   mmHg. IVC diameter 1.6 cm.        Coronary CTA 4-  1.  Technically difficult study.  Significant artifact affects interpretation.  Calcified plaque seen in the left main as well as the proximal left anterior  descending and circumflex.  The vessels appear most likely patent.  However,  severity of any stenosis could not be definitively determined due to significant  artifact.  Clinical correlation suggested.  Distal vessels not well-visualized.    LEFT MAIN: Moderate amount of circumferential calcified plaque seen.  There  appears to be a mild (30-50%) associated stenosis.  However, due to artifact,  the exact severity of stenosis could not be determined..  The vessel appears  most likely patent.     LAD:  Proximal: Diffuse calcified plaque seen.  Severity of stenosis could not be  determined due to artifact..  The vessel appears most likely patent.  Mid: Diffuse calcified plaque seen.  Severity of stenosis cannot be determined  due to artifact..  The vessel appears most likely patent.  Distal: Not well seen..  DIAG 1: Not well seen..  DIAG 2: Not well seen..     LCX:  Proximal: Diffuse calcified plaque seen.  The severity of stenosis could not be  determined due to artifact.  The vessel appears most likely patent.  Mid: Calcified plaque seen.  Severity of stenosis cannot be determined due to  artifact..  The vessel appears most likely patent.  Distal: Not well seen.  OM1:  Not well seen.  LPLB: Not well seen.  LPDA: Not well  seen.     RCA:  Right coronary artery not well-visualized due to motion artifact..  No calcified  plaque seen.  Small nondominant vessel.     2.  Cardiac Structures: Normal.  3.  Normal left ventricular systolic function.  4.  Coronary calcium score 837.  This places the patient in the DIAZ 85th risk  percentile for age and gender matched individuals.  5.  Overall quality of the scan was poor.         EKG 9/23/2022   Sinus  Rhythm  69bpm [id number]  PAC                                                 ASSESSMENT AND PLAN   Mr. Pappas is a 67-year-old gentleman with the following issues:    Assessment/Plan    Lower extremity edema  Patient has lower extremity/pedal edema which is his main complaint.  His primary issue's may still be his rheumatoid arthritis and possible gout which was a concern during his hospitalization.     His echocardiogram at [facility] did not suggest increased left atrial pressures.  Mildly elevated BNP.  Continue with furosemide 20 mg 3 times weekly.  His lower extremity edema has improved.      I spoke with his rheumatologist who also believes his rheumatologic issues are a factor in his lower extremity swelling.        Hyperlipidemia  Atorvastatin 40 mg daily    Primary hypertension  Metoprolol tartrate 12.5 mg p.o. twice daily    PAF (paroxysmal atrial fibrillation) (CMS/[facility])  Currently in sinus rhythm.  Continue with Pradaxa.  Denies any abnormal bleeding.  This was also explained to him-that because of his high risk profile and CHADS2 score anticoagulation is recommended indefinitely    Coronary artery disease involving native coronary artery of native heart without angina pectoris  CTA 4-2021 shows no high-grade lesions but visualization limited.  Low threshold for coronary angiogram if patient develops symptoms.  Was not continued on aspirin previously by cardiology as he is on Pradaxa.  Continue with metoprolol tartrate and atorvastatin 40 mg daily              Return in  about 6 months (around 3/23/2023).           Thank you for allowing me to participate in the care of this patient.  I hope this information is helpful.         Ajay Bhat MD [facility]  9/23/2022  9:46 AM    This document was generated utilizing voice recognition technology. A reasonable attempt at proofreading has been made to minimize errors but please excuse any typographical errors which may be present. Please call with any questions.          68

## 2022-09-23 NOTE — PROGRESS NOTES
Cardiology Outpatient Note    [facility] HEART [facility] Office  [address] [place] [address]     [facility]  The [facility] [address]  [address] [place] [address]     TEL  [phone]  [facility]     Clint Pappas is a 67 y.o. male patient here for cardiac evaluation.  The patient has seen cardiology at [facility] and Dr. Deven Caban at [facility].  He came here after speaking with his rheumatologist Dr. Masters.    Patient has a history of rheumatoid arthritis, hypertension, obstructive sleep apnea, BPH, who was admitted to [facility] 6-2022 with a urinary tract infection acute renal insufficiency.  However for him his main issue is lower extremity/ankle edema.  He did not have a DVT by ultrasound and had an echocardiogram at that time which showed normal left ventricular function.  He had paroxysmal atrial fibrillation at that time and was loaded with digoxin, reverted to sinus rhythm, digoxin discontinued, and started on Pradaxa.  Patient was then seen again in the emergency room and admitted 6- for pancreatitis.  He was evaluated by gastroenterology and they recommended discontinuation of methotrexate and thiazide diuretic as there may have been drug-induced liver injury with abnormal LFTs.    Patient was being evaluated for bariatric surgery and had an abnormal stress test with Dr. Caban.  He was scheduled for a coronary CTA when at [facility] a  stress test showed inferior ischemia.  He did not want to proceed with cardiac catheterization at that time and appears to have seen Dr. Caban at [facility].  Dr. Caban reviewed the study with imaging cardiologist and patient was not felt to have obstructive coronary artery disease.    Patient is here today because he has discomfort throughout his body but particularly in his ankles and his feet.  He has lower extremity swelling  which he is hoping to resolve.  In addition to the above history prior notes indicate the patient was on chlorthalidone which was discontinued because of acute renal injury.  Blood work from  shows creatinine 0.71.      He was started on furosemide and says his lower extremity edema has improved.  BNP was slightly elevated to 77.  No shortness of breath chest pain or palpitations.  Repeat creatinine is 0.79.    Overall he is feeling well and his feet feel better.  However he does notice increased discomfort with changes in weather.  His metoprolol dose was decreased to 12.5 p.o. twice daily because he was having some lightheadedness which has improved.  He says he is eating and drinking well.  Has obstructive sleep apnea but his CPAP machine was recalled and he does not plan on using it again.                                                         [facility]     Medical History:  Past Medical History:   Diagnosis Date    Arthritis     Atrial fibrillation (CMS/HCC)     Edema     Enlarged prostate     Gastroesophageal reflux disease without esophagitis     Hypertension     Lipid disorder     ABNER (obstructive sleep apnea)        Surgical History:  Past Surgical History:   Procedure Laterality Date    CARPAL TUNNEL RELEASE Bilateral     COLECTOMY      EYE SURGERY      NOSE SURGERY         Social History:  Social History     Tobacco Use    Smoking status: Former Smoker     Years: 15.00     Quit date:      Years since quittin.7    Smokeless tobacco: Never Used   Vaping Use    Vaping Use: Never used   Substance Use Topics    Alcohol use: Not Currently    Drug use: Never       Family History: He indicated that his biological mother is . He indicated that his biological father is . He indicated that the status of his biological brother is unknown. He indicated that his maternal grandmother is . He indicated that his maternal grandfather is  .      Allergies:Acetaminophen, Lisinopril, and Oxycodone    Current Medications:    Outpatient Encounter Medications as of 2022:     atorvastatin (LIPITOR) 40 mg tablet, Take 40 mg by mouth nightly.    fluticasone propionate (FLONASE) 50 mcg/actuation nasal spray, fluticasone propionate 50 mcg/actuation nasal spray,suspension    folic acid (FOLVITE) 1 mg tablet, Take 1 mg by mouth daily.    furosemide (LASIX) 20 mg tablet, Take 1 tablet (20 mg total) by mouth 3 (three) times a week (Mon, Wed, Fri).    hydrOXYchloroQUINE (PLAQUENIL) 200 mg tablet, Take 400 mg by mouth daily.    inFLIXimab (REMICADE) 100 mg injection, Infuse into a venous catheter.    METHOTREXATE SODIUM ORAL, once a week    metoprolol tartrate (LOPRESSOR) 25 mg tablet, Take 12.5 mg by mouth every 12 (twelve) hours.    oxybutynin XL (DITROPAN XL) 15 mg 24 hr tablet, TAKE ONE TABLET BY MOUTH IN THE MORNING AS DIRECTED    pantoprazole (PROTONIX) 40 mg EC tablet, Take 40 mg by mouth.    PRADAXA 150 mg capsu, Take 1 capsule (150 mg total) by mouth every 12 (twelve) hours.    tamsulosin (FLOMAX) 0.4 mg capsule, Take 0.4 mg by mouth.    testosterone cypionate (DEPO-TESTOTERONE) 200 mg/mL injection, every 14 (fourteen) days.    [DISCONTINUED] furosemide (LASIX) 20 mg tablet, Take 20 mg by mouth once daily. as directed                                                          OBJECTIVE   ROS as in HPI   Constitution: Negative for chills and fever.   Eyes: Negative for blurred vision and visual disturbance.   Cardiovascular: Negative for chest pain, dyspnea on exertion, near-syncope, palpitations and syncope.   Respiratory: Negative for hemoptysis, shortness of breath and wheezing.    Hematologic/Lymphatic: Negative for bleeding problem.   Skin: Negative for rash. No new skin changes  Gastrointestinal: Negative for abdominal pain, diarrhea, hematochezia, melena, nausea and vomiting.   Genitourinary: Negative for dysuria and  "hematuria.   Neurological: Negative for headaches.   Bilateral pedal/ankle edema improved   diffuse body aches and arthralgias         Vitals:    09/23/22 0915   BP: 102/60   BP Location: Left upper arm   Patient Position: Sitting   Pulse: 69   Resp: 16   Weight: 122 kg (269 lb 3.2 oz)   Height: 1.803 m (5' 11\")       BP Readings from Last 3 Encounters:   09/23/22 102/60   08/24/22 132/78   04/27/21 (!) 156/63     Wt Readings from Last 3 Encounters:   09/23/22 122 kg (269 lb 3.2 oz)   08/24/22 122 kg (268 lb)   05/04/22 127 kg (281 lb)       Physical Exam   Constitutional: Appears comfortable in no distress  HEENT:  Neck Supple.  No JVD upright no carotid bruits   Head: Normocephalic.   Eyes: Extraocular movements intact  Cardiovascular: Normal rate, regular rhythm 1 out of 6 systolic murmur left sternal border Exam reveals no friction rub.    Pulmonary/Chest: Effort normal and breath sounds normal. No wheezes.  Abdominal: Soft and nontender. Bowel sounds are normal.   Musculoskeletal: Trace ankle edema right greater than left  Neurological: Alert and oriented to person, place, and time.   Skin: Skin is warm and dry.   Psychiatric: Behavior is normal.            Objective   Lab Results   Component Value Date    CHOL 101 08/31/2022     Lab Results   Component Value Date    HDL 33 (L) 08/31/2022     Lab Results   Component Value Date    LDLCALC 52 08/31/2022     Lab Results   Component Value Date    TRIG 81 08/31/2022     Lab Results   Component Value Date    ALT 10 08/31/2022    AST 11 08/31/2022     Lab Results   Component Value Date    WBC 8.4 08/31/2022    HGB 10.0 (L) 08/31/2022    HCT 32.8 (L) 08/31/2022     (H) 08/31/2022     Lab Results   Component Value Date    GLUCOSE 160 (H) 08/31/2022     08/31/2022    K 4.8 08/31/2022    CO2 26 08/31/2022     08/31/2022    BUN 9 08/31/2022    CREATININE 0.79 08/31/2022     No results found for: HGBA1C  Lab Results   Component Value Date    TSH 0.50 " 08/31/2022     Lab Results   Component Value Date     (H) 08/31/2022     [unfilled]    Troponin I Results    No lab values to display.       No results found for: HSTROPONINI                                                       IMAGING     Trans thoracic echocardiogram [facility] 6-    Conclusions:   Normal left ventricular size. Normal left ventricular wall thickness. Normal left   ventricular systolic function. LV Ejection Fraction was 55 %.     Findings:   Study Quality:   Technically Difficult. Intravenous contrast utilized to enhance endocardial visualization.   Left Ventricle:   Normal left ventricular size. Normal left ventricular wall thickness. Normal left   ventricular systolic function. LV Ejection Fraction was 55 %.   Diastolic Function:   Mitral valve inflow pattern and Tissue Doppler imaging within normal limits for age.   Right Ventricle:   Normal right ventricular systolic function. Normal right ventricular size. TAPSE 2.9 cm.   Left Atrium:   The left atrium is normal in size. Left atrial volume index 11 ml/m2.   Right Atrium:   The right atrium is normal in size. Right Atrial Volume Index 7 ml/m2.   Atrial Septum:   Not well visualized.   Mitral Valve:   Mild mitral annular calcification. Mitral valve leaflets appear mildly thickened. Trivial   mitral regurgitation.   Aortic Valve:   Trileaflet aortic valve. No aortic regurgitation. No hemodynamically significant aortic   stenosis.   Tricuspid Valve:   Normal appearance of the tricuspid valve. There is trivial tricuspid regurgitation. RV   systolic pressure cannot be determined due to lack of a tricuspid regurgitation Doppler   signal.   Pulmonic Valve:   Normal appearance of the pulmonic valve.   Pericardium:   Normal pericardium with no significant pericardial effusion.   Aorta:   Normal-sized aortic root and ascending aorta.   IVC:   Normal size IVC with respiratory collapse consistent with a right atrial pressure of 3   mmHg. IVC  diameter 1.6 cm.        Coronary CTA 4-  1.  Technically difficult study.  Significant artifact affects interpretation.  Calcified plaque seen in the left main as well as the proximal left anterior  descending and circumflex.  The vessels appear most likely patent.  However,  severity of any stenosis could not be definitively determined due to significant  artifact.  Clinical correlation suggested.  Distal vessels not well-visualized.    LEFT MAIN: Moderate amount of circumferential calcified plaque seen.  There  appears to be a mild (30-50%) associated stenosis.  However, due to artifact,  the exact severity of stenosis could not be determined..  The vessel appears  most likely patent.     LAD:  Proximal: Diffuse calcified plaque seen.  Severity of stenosis could not be  determined due to artifact..  The vessel appears most likely patent.  Mid: Diffuse calcified plaque seen.  Severity of stenosis cannot be determined  due to artifact..  The vessel appears most likely patent.  Distal: Not well seen..  DIAG 1: Not well seen..  DIAG 2: Not well seen..     LCX:  Proximal: Diffuse calcified plaque seen.  The severity of stenosis could not be  determined due to artifact.  The vessel appears most likely patent.  Mid: Calcified plaque seen.  Severity of stenosis cannot be determined due to  artifact..  The vessel appears most likely patent.  Distal: Not well seen.  OM1:  Not well seen.  LPLB: Not well seen.  LPDA: Not well seen.     RCA:  Right coronary artery not well-visualized due to motion artifact..  No calcified  plaque seen.  Small nondominant vessel.     2.  Cardiac Structures: Normal.  3.  Normal left ventricular systolic function.  4.  Coronary calcium score 837.  This places the patient in the DIAZ 85th risk  percentile for age and gender matched individuals.  5.  Overall quality of the scan was poor.         EKG 9/23/2022   Sinus  Rhythm  69bpm [id number]  PAC                                                  ASSESSMENT AND PLAN   Mr. Pappas is a 67-year-old gentleman with the following issues:    Assessment/Plan    Lower extremity edema  Patient has lower extremity/pedal edema which is his main complaint.  His primary issue's may still be his rheumatoid arthritis and possible gout which was a concern during his hospitalization.     His echocardiogram at [facility] did not suggest increased left atrial pressures.  Mildly elevated BNP.  Continue with furosemide 20 mg 3 times weekly.  His lower extremity edema has improved.      I spoke with his rheumatologist who also believes his rheumatologic issues are a factor in his lower extremity swelling.        Hyperlipidemia  Atorvastatin 40 mg daily    Primary hypertension  Metoprolol tartrate 12.5 mg p.o. twice daily    PAF (paroxysmal atrial fibrillation) (CMS/[facility])  Currently in sinus rhythm.  Continue with Pradaxa.  Denies any abnormal bleeding.  This was also explained to him-that because of his high risk profile and CHADS2 score anticoagulation is recommended indefinitely    Coronary artery disease involving native coronary artery of native heart without angina pectoris  CTA 4-2021 shows no high-grade lesions but visualization limited.  Low threshold for coronary angiogram if patient develops symptoms.  Was not continued on aspirin previously by cardiology as he is on Pradaxa.  Continue with metoprolol tartrate and atorvastatin 40 mg daily              Return in about 6 months (around 3/23/2023).           Thank you for allowing me to participate in the care of this patient.  I hope this information is helpful.         Ajay Bhat MD [facility] JACQUI  9/23/2022  9:46 AM    This document was generated utilizing voice recognition technology. A reasonable attempt at proofreading has been made to minimize errors but please excuse any typographical errors which may be present. Please call with any questions.

## 2022-09-23 NOTE — ASSESSMENT & PLAN NOTE
CTA 4-2021 shows no high-grade lesions but visualization limited.  Low threshold for coronary angiogram if patient develops symptoms.  Was not continued on aspirin previously by cardiology as he is on Pradaxa.  Continue with metoprolol tartrate and atorvastatin 40 mg daily

## 2022-09-23 NOTE — ASSESSMENT & PLAN NOTE
Patient has lower extremity/pedal edema which is his main complaint.  His primary issue's may still be his rheumatoid arthritis and possible gout which was a concern during his hospitalization.     His echocardiogram at [facility] did not suggest increased left atrial pressures.  Mildly elevated BNP.  Continue with furosemide 20 mg 3 times weekly.  His lower extremity edema has improved.      I spoke with his rheumatologist who also believes his rheumatologic issues are a factor in his lower extremity swelling.

## 2023-01-11 ENCOUNTER — TELEPHONE (OUTPATIENT)
Dept: SCHEDULING | Facility: CLINIC | Age: 68
End: 2023-01-11
Payer: MEDICARE

## 2023-01-11 NOTE — TELEPHONE ENCOUNTER
Patient is calling regarding PRADAXA 150 mg capsule. Pharmacy are asking pt to pay $500+ for 30 day supply. Pt is seeking another option.n Pt has no meds left    Patient can be reached [phone]

## 2023-01-11 NOTE — TELEPHONE ENCOUNTER
"I called and spoke with patient. He received a letter in the mail stating his \"tier had changed\" and cost of pradaxa for a 30 day supply would be $512. I called and spoke with pharmacist at [facility]. She believes that this cost is due to a deductible that he must pay in the beginning of the year and that moving forward it would not cost this much but couldn't say how much it would be monthly. Additionally she checked both eliquis and xarelto for me, both brand name and expensive. Pradaxa became generic in September, according to pharmacist it will be on the market for months as generic before the price drops but will most likely be most affordable for patient compared to the other two. Pt came to the office and I gave him 3 weeks worth of samples of pradaxa 150 mg twice a day. He understands the price this month may be his deductible and may be unavoidable. He will call insurance today to ask if they can tell him the monthly price moving forward and if that would be manageable for patient. Previously medication has been very affordable for him at $43 monthly. Pt already states he does not wish to be on coumadin. "

## 2023-03-24 ENCOUNTER — OFFICE VISIT (OUTPATIENT)
Dept: CARDIOLOGY | Facility: CLINIC | Age: 68
End: 2023-03-24
Payer: MEDICARE

## 2023-03-24 VITALS
SYSTOLIC BLOOD PRESSURE: 130 MMHG | DIASTOLIC BLOOD PRESSURE: 80 MMHG | BODY MASS INDEX: 40.46 KG/M2 | HEART RATE: 83 BPM | WEIGHT: 289 LBS | HEIGHT: 71 IN | RESPIRATION RATE: 16 BRPM

## 2023-03-24 DIAGNOSIS — I48.0 PAF (PAROXYSMAL ATRIAL FIBRILLATION) (CMS/HCC): Primary | ICD-10-CM

## 2023-03-24 DIAGNOSIS — I10 ESSENTIAL (PRIMARY) HYPERTENSION: ICD-10-CM

## 2023-03-24 PROBLEM — R06.02 SOB (SHORTNESS OF BREATH): Status: ACTIVE | Noted: 2023-03-24

## 2023-03-24 PROCEDURE — 99215 OFFICE O/P EST HI 40 MIN: CPT | Performed by: INTERNAL MEDICINE

## 2023-03-24 PROCEDURE — G8752 SYS BP LESS 140: HCPCS | Performed by: INTERNAL MEDICINE

## 2023-03-24 PROCEDURE — G8754 DIAS BP LESS 90: HCPCS | Performed by: INTERNAL MEDICINE

## 2023-03-24 PROCEDURE — 93000 ELECTROCARDIOGRAM COMPLETE: CPT | Performed by: INTERNAL MEDICINE

## 2023-03-24 NOTE — PROGRESS NOTES
Cardiology Outpatient Note    [facility] HEART [facility] Office  [address] [place] [address]     [facility]  [facility] [address]  [address] [place] [address]     TEL  [phone]  [facility]     Clint Pappas is a 68 y.o. male patient here for cardiac evaluation.  The patient has seen cardiology at [facility] and Dr. Deven Caban at [facility].  He came here after speaking with his rheumatologist Dr. Masters.    Patient has a history of rheumatoid arthritis, hypertension, obstructive sleep apnea, BPH, who was admitted to [facility] 6-2022 with a urinary tract infection acute renal insufficiency.  However for him his main issue is lower extremity/ankle edema.  He did not have a DVT by ultrasound and had an echocardiogram at that time which showed normal left ventricular function.  He had paroxysmal atrial fibrillation at that time and was loaded with digoxin, reverted to sinus rhythm, digoxin discontinued, and started on Pradaxa.  Patient was then seen again in the emergency room and admitted 6- for pancreatitis.  He was evaluated by gastroenterology and they recommended discontinuation of methotrexate and thiazide diuretic as there may have been drug-induced liver injury with abnormal LFTs.    Patient was being evaluated for bariatric surgery and had an abnormal stress test with Dr. Caban.  He was scheduled for a coronary CTA when at [facility] a  stress test showed inferior ischemia.  He did not want to proceed with cardiac catheterization at that time and appears to have seen Dr. Caban at [facility].  Dr. Caban reviewed the study with imaging cardiologist and patient was not felt to have obstructive coronary artery disease.    Patient was evaluated for lower extremity edema which was ultimately attributed to gout and his rheumatologic issues.  It has improved. Prior  notes indicate the patient was on chlorthalidone which was discontinued because of acute renal injury.  Blood work from  shows creatinine 0.71.  He is currently on furosemide 20 mg every other day.  Repeat creatinine 0.79.       Has obstructive sleep apnea but his CPAP machine was recalled and he does not plan on using it again.  Also saw ENT and has nasal congestion.  Has longstanding shortness of breath but no change.  No fevers chills diaphoresis or chest pain.                                                         [facility]     Medical History:  Past Medical History:   Diagnosis Date   • Arthritis    • Atrial fibrillation (CMS/HCC)    • Edema    • Enlarged prostate    • Gastroesophageal reflux disease without esophagitis    • Hypertension    • Lipid disorder    • ABNER (obstructive sleep apnea)        Surgical History:  Past Surgical History:   Procedure Laterality Date   • CARPAL TUNNEL RELEASE Bilateral    • COLECTOMY     • EYE SURGERY     • NOSE SURGERY         Social History:  Social History     Tobacco Use   • Smoking status: Former     Years: 15.00     Types: Cigarettes     Quit date:      Years since quittin.2   • Smokeless tobacco: Never   Vaping Use   • Vaping status: Never Used   Substance Use Topics   • Alcohol use: Not Currently   • Drug use: Never       Family History: He indicated that his biological mother is . He indicated that his biological father is . He indicated that the status of his biological brother is unknown. He indicated that his maternal grandmother is . He indicated that his maternal grandfather is .      Allergies:Acetaminophen, Lisinopril, and Oxycodone    Current Medications:    Outpatient Encounter Medications as of 3/24/2023:   •  atorvastatin (LIPITOR) 40 mg tablet, Take 40 mg by mouth nightly.  •  fluticasone propionate (FLONASE) 50 mcg/actuation nasal spray, Administer 2 sprays into each nostril as needed.  •  folic acid (FOLVITE) 1 mg  "tablet, Take 1 mg by mouth daily.  •  furosemide (LASIX) 20 mg tablet, Take 1 tablet (20 mg total) by mouth 3 (three) times a week (Mon, Wed, Fri). (Patient taking differently: Take 20 mg by mouth every other day.)  •  hydrOXYchloroQUINE (PLAQUENIL) 200 mg tablet, Take 400 mg by mouth daily.  •  METHOTREXATE SODIUM ORAL, Inject into the shoulder, thigh, or buttocks once a week.  •  metoprolol tartrate (LOPRESSOR) 25 mg tablet, Take 12.5 mg by mouth every 12 (twelve) hours.  •  oxybutynin XL (DITROPAN XL) 15 mg 24 hr tablet, TAKE ONE TABLET BY MOUTH IN THE MORNING AS DIRECTED  •  pantoprazole (PROTONIX) 40 mg EC tablet, Take 40 mg by mouth as needed.  •  PRADAXA 150 mg capsu, Take 1 capsule (150 mg total) by mouth every 12 (twelve) hours.  •  tamsulosin (FLOMAX) 0.4 mg capsule, Take 0.4 mg by mouth daily.  •  testosterone cypionate (DEPO-TESTOTERONE) 200 mg/mL injection, every 28 (twentyeight) days.  •  inFLIXimab (REMICADE) 100 mg injection, Infuse into a venous catheter.                                                          OBJECTIVE   ROS as in HPI   Constitution: Negative for chills and fever.   Eyes: Negative for blurred vision and visual disturbance.   Cardiovascular: Negative for chest pain, dyspnea on exertion, near-syncope, palpitations and syncope.   Respiratory: Negative for hemoptysis, longstanding shortness of breath as noted above.  Hematologic/Lymphatic: Negative for bleeding problem.   Skin: Negative for rash. No new skin changes  Gastrointestinal: Negative for abdominal pain, diarrhea, hematochezia, melena, nausea and vomiting.   Genitourinary: Negative for dysuria and hematuria.   Neurological: Negative for headaches.   Bilateral pedal/ankle edema improved   diffuse body aches and arthralgias         Vitals:    03/24/23 1040   BP: 130/80   BP Location: Left upper arm   Patient Position: Sitting   Pulse: 83   Resp: 16   Weight: 131 kg (289 lb)   Height: 1.803 m (5' 10.98\")       BP Readings from " Last 3 Encounters:   03/24/23 130/80   09/23/22 102/60   08/24/22 132/78     Wt Readings from Last 3 Encounters:   03/24/23 131 kg (289 lb)   09/23/22 122 kg (269 lb 3.2 oz)   08/24/22 122 kg (268 lb)       Physical Exam   Constitutional: Appears comfortable in no distress  HEENT:  Neck Supple.  No JVD upright no carotid bruits   Head: Normocephalic.   Eyes: Extraocular movements intact  Cardiovascular: Normal rate, regular rhythm 1 out of 6 systolic murmur left sternal border Exam reveals no friction rub.    Pulmonary/Chest: Effort normal and breath sounds normal. No wheezes.  Abdominal: Soft and nontender. Bowel sounds are normal.   Musculoskeletal: Trace ankle edema right greater than left  Neurological: Alert and oriented to person, place, and time.   Skin: Skin is warm and dry.   Psychiatric: Behavior is normal.            Objective   Lab Results   Component Value Date    CHOL 101 08/31/2022     Lab Results   Component Value Date    HDL 33 (L) 08/31/2022     Lab Results   Component Value Date    LDLCALC 52 08/31/2022     Lab Results   Component Value Date    TRIG 81 08/31/2022     Lab Results   Component Value Date    ALT 10 08/31/2022    AST 11 08/31/2022     Lab Results   Component Value Date    WBC 8.4 08/31/2022    HGB 10.0 (L) 08/31/2022    HCT 32.8 (L) 08/31/2022     (H) 08/31/2022     Lab Results   Component Value Date    GLUCOSE 160 (H) 08/31/2022     08/31/2022    K 4.8 08/31/2022    CO2 26 08/31/2022     08/31/2022    BUN 9 08/31/2022    CREATININE 0.79 08/31/2022     No results found for: HGBA1C  Lab Results   Component Value Date    TSH 0.50 08/31/2022     Lab Results   Component Value Date     (H) 08/31/2022     [unfilled]    Troponin I Results    No lab values to display.       No results found for: HSTROPONINI                                                       IMAGING     Trans thoracic echocardiogram [facility] 6-    Conclusions:   Normal left ventricular  size. Normal left ventricular wall thickness. Normal left   ventricular systolic function. LV Ejection Fraction was 55 %.     Findings:   Study Quality:   Technically Difficult. Intravenous contrast utilized to enhance endocardial visualization.   Left Ventricle:   Normal left ventricular size. Normal left ventricular wall thickness. Normal left   ventricular systolic function. LV Ejection Fraction was 55 %.   Diastolic Function:   Mitral valve inflow pattern and Tissue Doppler imaging within normal limits for age.   Right Ventricle:   Normal right ventricular systolic function. Normal right ventricular size. TAPSE 2.9 cm.   Left Atrium:   The left atrium is normal in size. Left atrial volume index 11 ml/m2.   Right Atrium:   The right atrium is normal in size. Right Atrial Volume Index 7 ml/m2.   Atrial Septum:   Not well visualized.   Mitral Valve:   Mild mitral annular calcification. Mitral valve leaflets appear mildly thickened. Trivial   mitral regurgitation.   Aortic Valve:   Trileaflet aortic valve. No aortic regurgitation. No hemodynamically significant aortic   stenosis.   Tricuspid Valve:   Normal appearance of the tricuspid valve. There is trivial tricuspid regurgitation. RV   systolic pressure cannot be determined due to lack of a tricuspid regurgitation Doppler   signal.   Pulmonic Valve:   Normal appearance of the pulmonic valve.   Pericardium:   Normal pericardium with no significant pericardial effusion.   Aorta:   Normal-sized aortic root and ascending aorta.   IVC:   Normal size IVC with respiratory collapse consistent with a right atrial pressure of 3   mmHg. IVC diameter 1.6 cm.        Coronary CTA 4-  1.  Technically difficult study.  Significant artifact affects interpretation.  Calcified plaque seen in the left main as well as the proximal left anterior  descending and circumflex.  The vessels appear most likely patent.  However,  severity of any stenosis could not be definitively  determined due to significant  artifact.  Clinical correlation suggested.  Distal vessels not well-visualized.    LEFT MAIN: Moderate amount of circumferential calcified plaque seen.  There  appears to be a mild (30-50%) associated stenosis.  However, due to artifact,  the exact severity of stenosis could not be determined..  The vessel appears  most likely patent.     LAD:  Proximal: Diffuse calcified plaque seen.  Severity of stenosis could not be  determined due to artifact..  The vessel appears most likely patent.  Mid: Diffuse calcified plaque seen.  Severity of stenosis cannot be determined  due to artifact..  The vessel appears most likely patent.  Distal: Not well seen..  DIAG 1: Not well seen..  DIAG 2: Not well seen..     LCX:  Proximal: Diffuse calcified plaque seen.  The severity of stenosis could not be  determined due to artifact.  The vessel appears most likely patent.  Mid: Calcified plaque seen.  Severity of stenosis cannot be determined due to  artifact..  The vessel appears most likely patent.  Distal: Not well seen.  OM1:  Not well seen.  LPLB: Not well seen.  LPDA: Not well seen.     RCA:  Right coronary artery not well-visualized due to motion artifact..  No calcified  plaque seen.  Small nondominant vessel.     2.  Cardiac Structures: Normal.  3.  Normal left ventricular systolic function.  4.  Coronary calcium score 837.  This places the patient in the DIAZ 85th risk  percentile for age and gender matched individuals.  5.  Overall quality of the scan was poor.         EKG 3/24/2023   Sinus  Rhythm 83bpm [id number]   - PVC                                                   ASSESSMENT AND PLAN   Mr. Pappas is a 68-year-old gentleman with the following issues:    Assessment/Plan    Coronary artery disease involving native coronary artery of native heart without angina pectoris  CTA 4-2021 shows no high-grade lesions but visualization limited.      Patient has extensive family history of coronary  artery disease and ongoing symptoms.  For definitive evaluation he has agreed to proceed with right and left heart catheterization.      He is on Pradaxa 150 mg twice daily.  Was not continued on aspirin previously by cardiology as he is on Pradaxa.  Continue with metoprolol tartrate and atorvastatin 40 mg daily    PAF (paroxysmal atrial fibrillation) (CMS/HCC)  Currently in sinus rhythm.  Continue with Pradaxa.  Denies any abnormal bleeding.  This was also explained to him-that because of his high risk profile and CHADS2 score anticoagulation is recommended indefinitely    Primary hypertension  Metoprolol tartrate 12.5 mg p.o. twice daily    Hyperlipidemia  Atorvastatin 40 mg daily    Lower extremity edema  Lower extremity/pedal edema which is his main complaint.  Now resolved but secondary to rheumatologic issues/gout.      Mildly elevated BNP.  Continue with furosemide 20 mg every other day.          SOB (shortness of breath)  Shortness of breath is multifactorial related to weight sleep apnea and possible sinus issues.    In terms of sleep apnea he is waiting for replacement machine and was given a name to establish care with a sleep medicine doctor.  His previous sleep medicine physician left [place].    As noted above patient is being scheduled for coronary angiogram for definitive evaluation of coronary artery disease as a contributing factor to his shortness of breath.  No chest pain or unstable symptoms.  He will also have a right heart catheterization at that time as well              Return in about 3 months (around 6/24/2023).    I personally spent total 41 minutes face to face with the patient in counseling, review of records and discussion and/or coordination of care as described above.          Thank you for allowing me to participate in the care of this patient.  I hope this information is helpful.         Ajay Bhat MD [facility] JACQUI  3/24/2023  11:38 AM    This document was generated utilizing  voice recognition technology. A reasonable attempt at proofreading has been made to minimize errors but please excuse any typographical errors which may be present. Please call with any questions.

## 2023-03-24 NOTE — ASSESSMENT & PLAN NOTE
CTA 4-2021 shows no high-grade lesions but visualization limited.      Patient has extensive family history of coronary artery disease and ongoing symptoms.  For definitive evaluation he has agreed to proceed with right and left heart catheterization.      He is on Pradaxa 150 mg twice daily.  Was not continued on aspirin previously by cardiology as he is on Pradaxa.  Continue with metoprolol tartrate and atorvastatin 40 mg daily

## 2023-03-24 NOTE — ASSESSMENT & PLAN NOTE
Shortness of breath is multifactorial related to weight sleep apnea and possible sinus issues.    In terms of sleep apnea he is waiting for replacement machine and was given a name to establish care with a sleep medicine doctor.  His previous sleep medicine physician left [place].    As noted above patient is being scheduled for coronary angiogram for definitive evaluation of coronary artery disease as a contributing factor to his shortness of breath.  No chest pain or unstable symptoms.  He will also have a right heart catheterization at that time as well

## 2023-03-24 NOTE — H&P (VIEW-ONLY)
Cardiology Outpatient Note    [facility] HEART [facility] [place] [facility]  [address] [place] [address]     [facility]  The Heart [address]  [address] [place] [address]     TEL  [phone]  [facility]     Clint Pappas is a 68 y.o. male patient here for cardiac evaluation.  The patient has seen cardiology at [facility] and Dr. Deven Caban at [facility].  He came here after speaking with his rheumatologist Dr. Masters.    Patient has a history of rheumatoid arthritis, hypertension, obstructive sleep apnea, BPH, who was admitted to [facility] 6-2022 with a urinary tract infection acute renal insufficiency.  However for him his main issue is lower extremity/ankle edema.  He did not have a DVT by ultrasound and had an echocardiogram at that time which showed normal left ventricular function.  He had paroxysmal atrial fibrillation at that time and was loaded with digoxin, reverted to sinus rhythm, digoxin discontinued, and started on Pradaxa.  Patient was then seen again in the emergency room and admitted 6- for pancreatitis.  He was evaluated by gastroenterology and they recommended discontinuation of methotrexate and thiazide diuretic as there may have been drug-induced liver injury with abnormal LFTs.    Patient was being evaluated for bariatric surgery and had an abnormal stress test with Dr. Caban.  He was scheduled for a coronary CTA when at [facility] a  stress test showed inferior ischemia.  He did not want to proceed with cardiac catheterization at that time and appears to have seen Dr. Caban at [facility].  Dr. Caban reviewed the study with imaging cardiologist and patient was not felt to have obstructive coronary artery disease.    Patient was evaluated for lower extremity edema which was ultimately attributed to gout and his rheumatologic issues.  It has improved. Prior  notes indicate the patient was on chlorthalidone which was discontinued because of acute renal injury.  Blood work from  shows creatinine 0.71.  He is currently on furosemide 20 mg every other day.  Repeat creatinine 0.79.       Has obstructive sleep apnea but his CPAP machine was recalled and he does not plan on using it again.  Also saw ENT and has nasal congestion.  Has longstanding shortness of breath but no change.  No fevers chills diaphoresis or chest pain.                                                         [facility]     Medical History:  Past Medical History:   Diagnosis Date   • Arthritis    • Atrial fibrillation (CMS/HCC)    • Edema    • Enlarged prostate    • Gastroesophageal reflux disease without esophagitis    • Hypertension    • Lipid disorder    • ABNER (obstructive sleep apnea)        Surgical History:  Past Surgical History:   Procedure Laterality Date   • CARPAL TUNNEL RELEASE Bilateral    • COLECTOMY     • EYE SURGERY     • NOSE SURGERY         Social History:  Social History     Tobacco Use   • Smoking status: Former     Years: 15.00     Types: Cigarettes     Quit date:      Years since quittin.2   • Smokeless tobacco: Never   Vaping Use   • Vaping status: Never Used   Substance Use Topics   • Alcohol use: Not Currently   • Drug use: Never       Family History: He indicated that his biological mother is . He indicated that his biological father is . He indicated that the status of his biological brother is unknown. He indicated that his maternal grandmother is . He indicated that his maternal grandfather is .      Allergies:Acetaminophen, Lisinopril, and Oxycodone    Current Medications:    Outpatient Encounter Medications as of 3/24/2023:   •  atorvastatin (LIPITOR) 40 mg tablet, Take 40 mg by mouth nightly.  •  fluticasone propionate (FLONASE) 50 mcg/actuation nasal spray, Administer 2 sprays into each nostril as needed.  •  folic acid (FOLVITE) 1 mg  "tablet, Take 1 mg by mouth daily.  •  furosemide (LASIX) 20 mg tablet, Take 1 tablet (20 mg total) by mouth 3 (three) times a week (Mon, Wed, Fri). (Patient taking differently: Take 20 mg by mouth every other day.)  •  hydrOXYchloroQUINE (PLAQUENIL) 200 mg tablet, Take 400 mg by mouth daily.  •  METHOTREXATE SODIUM ORAL, Inject into the shoulder, thigh, or buttocks once a week.  •  metoprolol tartrate (LOPRESSOR) 25 mg tablet, Take 12.5 mg by mouth every 12 (twelve) hours.  •  oxybutynin XL (DITROPAN XL) 15 mg 24 hr tablet, TAKE ONE TABLET BY MOUTH IN THE MORNING AS DIRECTED  •  pantoprazole (PROTONIX) 40 mg EC tablet, Take 40 mg by mouth as needed.  •  PRADAXA 150 mg capsu, Take 1 capsule (150 mg total) by mouth every 12 (twelve) hours.  •  tamsulosin (FLOMAX) 0.4 mg capsule, Take 0.4 mg by mouth daily.  •  testosterone cypionate (DEPO-TESTOTERONE) 200 mg/mL injection, every 28 (twentyeight) days.  •  inFLIXimab (REMICADE) 100 mg injection, Infuse into a venous catheter.                                                          OBJECTIVE   ROS as in HPI   Constitution: Negative for chills and fever.   Eyes: Negative for blurred vision and visual disturbance.   Cardiovascular: Negative for chest pain, dyspnea on exertion, near-syncope, palpitations and syncope.   Respiratory: Negative for hemoptysis, longstanding shortness of breath as noted above.  Hematologic/Lymphatic: Negative for bleeding problem.   Skin: Negative for rash. No new skin changes  Gastrointestinal: Negative for abdominal pain, diarrhea, hematochezia, melena, nausea and vomiting.   Genitourinary: Negative for dysuria and hematuria.   Neurological: Negative for headaches.   Bilateral pedal/ankle edema improved   diffuse body aches and arthralgias         Vitals:    03/24/23 1040   BP: 130/80   BP Location: Left upper arm   Patient Position: Sitting   Pulse: 83   Resp: 16   Weight: 131 kg (289 lb)   Height: 1.803 m (5' 10.98\")       BP Readings from " Last 3 Encounters:   03/24/23 130/80   09/23/22 102/60   08/24/22 132/78     Wt Readings from Last 3 Encounters:   03/24/23 131 kg (289 lb)   09/23/22 122 kg (269 lb 3.2 oz)   08/24/22 122 kg (268 lb)       Physical Exam   Constitutional: Appears comfortable in no distress  HEENT:  Neck Supple.  No JVD upright no carotid bruits   Head: Normocephalic.   Eyes: Extraocular movements intact  Cardiovascular: Normal rate, regular rhythm 1 out of 6 systolic murmur left sternal border Exam reveals no friction rub.    Pulmonary/Chest: Effort normal and breath sounds normal. No wheezes.  Abdominal: Soft and nontender. Bowel sounds are normal.   Musculoskeletal: Trace ankle edema right greater than left  Neurological: Alert and oriented to person, place, and time.   Skin: Skin is warm and dry.   Psychiatric: Behavior is normal.            Objective   Lab Results   Component Value Date    CHOL 101 08/31/2022     Lab Results   Component Value Date    HDL 33 (L) 08/31/2022     Lab Results   Component Value Date    LDLCALC 52 08/31/2022     Lab Results   Component Value Date    TRIG 81 08/31/2022     Lab Results   Component Value Date    ALT 10 08/31/2022    AST 11 08/31/2022     Lab Results   Component Value Date    WBC 8.4 08/31/2022    HGB 10.0 (L) 08/31/2022    HCT 32.8 (L) 08/31/2022     (H) 08/31/2022     Lab Results   Component Value Date    GLUCOSE 160 (H) 08/31/2022     08/31/2022    K 4.8 08/31/2022    CO2 26 08/31/2022     08/31/2022    BUN 9 08/31/2022    CREATININE 0.79 08/31/2022     No results found for: HGBA1C  Lab Results   Component Value Date    TSH 0.50 08/31/2022     Lab Results   Component Value Date     (H) 08/31/2022     [unfilled]    Troponin I Results    No lab values to display.       No results found for: HSTROPONINI                                                       IMAGING     Trans [facility] echocardiogram [facility]    Conclusions:   Normal left ventricular  size. Normal left ventricular wall thickness. Normal left   ventricular systolic function. LV Ejection Fraction was 55 %.     Findings:   Study Quality:   Technically Difficult. Intravenous contrast utilized to enhance endocardial visualization.   Left Ventricle:   Normal left ventricular size. Normal left ventricular wall thickness. Normal left   ventricular systolic function. LV Ejection Fraction was 55 %.   Diastolic Function:   Mitral valve inflow pattern and Tissue Doppler imaging within normal limits for age.   Right Ventricle:   Normal right ventricular systolic function. Normal right ventricular size. TAPSE 2.9 cm.   Left Atrium:   The left atrium is normal in size. Left atrial volume index 11 ml/m2.   Right Atrium:   The right atrium is normal in size. Right Atrial Volume Index 7 ml/m2.   Atrial Septum:   Not well visualized.   Mitral Valve:   Mild mitral annular calcification. Mitral valve leaflets appear mildly thickened. Trivial   mitral regurgitation.   Aortic Valve:   Trileaflet aortic valve. No aortic regurgitation. No hemodynamically significant aortic   stenosis.   Tricuspid Valve:   Normal appearance of the tricuspid valve. There is trivial tricuspid regurgitation. RV   systolic pressure cannot be determined due to lack of a tricuspid regurgitation Doppler   signal.   Pulmonic Valve:   Normal appearance of the pulmonic valve.   Pericardium:   Normal pericardium with no significant pericardial effusion.   Aorta:   Normal-sized aortic root and ascending aorta.   IVC:   Normal size IVC with respiratory collapse consistent with a right atrial pressure of 3   mmHg. IVC diameter 1.6 cm.        Coronary CTA 4-  1.  Technically difficult study.  Significant artifact affects interpretation.  Calcified plaque seen in the left main as well as the proximal left anterior  descending and circumflex.  The vessels appear most likely patent.  However,  severity of any stenosis could not be definitively  determined due to significant  artifact.  Clinical correlation suggested.  Distal vessels not well-visualized.    LEFT MAIN: Moderate amount of circumferential calcified plaque seen.  There  appears to be a mild (30-50%) associated stenosis.  However, due to artifact,  the exact severity of stenosis could not be determined..  The vessel appears  most likely patent.     LAD:  Proximal: Diffuse calcified plaque seen.  Severity of stenosis could not be  determined due to artifact..  The vessel appears most likely patent.  Mid: Diffuse calcified plaque seen.  Severity of stenosis cannot be determined  due to artifact..  The vessel appears most likely patent.  Distal: Not well seen..  DIAG 1: Not well seen..  DIAG 2: Not well seen..     LCX:  Proximal: Diffuse calcified plaque seen.  The severity of stenosis could not be  determined due to artifact.  The vessel appears most likely patent.  Mid: Calcified plaque seen.  Severity of stenosis cannot be determined due to  artifact..  The vessel appears most likely patent.  Distal: Not well seen.  OM1:  Not well seen.  LPLB: Not well seen.  LPDA: Not well seen.     RCA:  Right coronary artery not well-visualized due to motion artifact..  No calcified  plaque seen.  Small nondominant vessel.     2.  Cardiac Structures: Normal.  3.  Normal left ventricular systolic function.  4.  Coronary calcium score 837.  This places the patient in the DIAZ 85th risk  percentile for age and gender matched individuals.  5.  Overall quality of the scan was poor.         EKG 3/24/2023   Sinus  Rhythm 83bpm [id number]   - PVC                                                   ASSESSMENT AND PLAN   Mr. Pappas is a 68-year-old gentleman with the following issues:    Assessment/Plan    Coronary artery disease involving native coronary artery of native heart without angina pectoris  CTA 4-2021 shows no high-grade lesions but visualization limited.      Patient has extensive family history of coronary  artery disease and ongoing symptoms.  For definitive evaluation he has agreed to proceed with right and left heart catheterization.      He is on Pradaxa 150 mg twice daily.  Was not continued on aspirin previously by cardiology as he is on Pradaxa.  Continue with metoprolol tartrate and atorvastatin 40 mg daily    PAF (paroxysmal atrial fibrillation) (CMS/HCC)  Currently in sinus rhythm.  Continue with Pradaxa.  Denies any abnormal bleeding.  This was also explained to him-that because of his high risk profile and CHADS2 score anticoagulation is recommended indefinitely    Primary hypertension  Metoprolol tartrate 12.5 mg p.o. twice daily    Hyperlipidemia  Atorvastatin 40 mg daily    Lower extremity edema  Lower extremity/pedal edema which is his main complaint.  Now resolved but secondary to rheumatologic issues/gout.      Mildly elevated BNP.  Continue with furosemide 20 mg every other day.          SOB (shortness of breath)  Shortness of breath is multifactorial related to weight sleep apnea and possible sinus issues.    In terms of sleep apnea he is waiting for replacement machine and was given a name to establish care with a sleep medicine doctor.  His previous sleep medicine physician left [place].    As noted above patient is being scheduled for coronary angiogram for definitive evaluation of coronary artery disease as a contributing factor to his shortness of breath.  No chest pain or unstable symptoms.  He will also have a right heart catheterization at that time as well              Return in about 3 months (around 6/24/2023).    I personally spent total 41 minutes face to face with the patient in counseling, review of records and discussion and/or coordination of care as described above.          Thank you for allowing me to participate in the care of this patient.  I hope this information is helpful.         Ajay Bhat MD [facility] JACQUI  3/24/2023  11:38 AM    This document was generated utilizing  voice recognition technology. A reasonable attempt at proofreading has been made to minimize errors but please excuse any typographical errors which may be present. Please call with any questions.

## 2023-03-24 NOTE — ASSESSMENT & PLAN NOTE
Lower extremity/pedal edema which is his main complaint.  Now resolved but secondary to rheumatologic issues/gout.      Mildly elevated BNP.  Continue with furosemide 20 mg every other day.

## 2023-03-24 NOTE — Clinical Note
Please schedule patient for left and right heart catheterization within next 2 months.  No dye allergy.  Given prescription for blood work.  Is normally on Pradaxa 150 mg twice daily.

## 2023-03-24 NOTE — LETTER
March 24, 2023     Kush Wen MD  [address] [facility] [address]    Patient: Clint Pappas  YOB: 1955  Date of Visit: 3/24/2023      Dear Dr. Wen:    Thank you for referring Clint Pappas to me for evaluation. Below are my notes for this consultation.    If you have questions, please do not hesitate to call me. I look forward to following your patient along with you.         Sincerely,        Ajay Bhat MD        CC: No Recipients    Ajay Bhat MD  3/24/2023 11:39 AM  Signed       Cardiology Outpatient Note    [facility] Office  [address] [place] [address]     [facility]  [facility] Level  [address] [place] [address]     TEL  [phone]  [email or web address]     Clint Pappas is a 68 y.o. male patient here for cardiac evaluation.  The patient has seen cardiology at [facility] and Dr. Deven Caban at [facility].  He came here after speaking with his rheumatologist Dr. Masters.    Patient has a history of rheumatoid arthritis, hypertension, obstructive sleep apnea, BPH, who was admitted to [facility] 6-2022 with a urinary tract infection acute renal insufficiency.  However for him his main issue is lower extremity/ankle edema.  He did not have a DVT by ultrasound and had an echocardiogram at that time which showed normal left ventricular function.  He had paroxysmal atrial fibrillation at that time and was loaded with digoxin, reverted to sinus rhythm, digoxin discontinued, and started on Pradaxa.  Patient was then seen again in the emergency room and admitted 6- for pancreatitis.  He was evaluated by gastroenterology and they recommended discontinuation of methotrexate and thiazide diuretic as there may have been drug-induced liver injury with abnormal LFTs.    Patient was being evaluated for bariatric surgery and had an abnormal stress test  with Dr. Caban.  He was scheduled for a coronary CTA when at [facility] a  stress test showed inferior ischemia.  He did not want to proceed with cardiac catheterization at that time and appears to have seen Dr. Caban at [facility].  Dr. Caban reviewed the study with imaging cardiologist and patient was not felt to have obstructive coronary artery disease.    Patient was evaluated for lower extremity edema which was ultimately attributed to gout and his rheumatologic issues.  It has improved. Prior notes indicate the patient was on chlorthalidone which was discontinued because of acute renal injury.  Blood work from  shows creatinine 0.71.  He is currently on furosemide 20 mg every other day.  Repeat creatinine 0.79.       Has obstructive sleep apnea but his CPAP machine was recalled and he does not plan on using it again.  Also saw ENT and has nasal congestion.  Has longstanding shortness of breath but no change.  No fevers chills diaphoresis or chest pain.                                                         PMH     Medical History:  Past Medical History:   Diagnosis Date   • Arthritis    • Atrial fibrillation (CMS/HCC)    • Edema    • Enlarged prostate    • Gastroesophageal reflux disease without esophagitis    • Hypertension    • Lipid disorder    • ABNER (obstructive sleep apnea)        Surgical History:  Past Surgical History:   Procedure Laterality Date   • CARPAL TUNNEL RELEASE Bilateral    • COLECTOMY     • EYE SURGERY     • NOSE SURGERY         Social History:  Social History     Tobacco Use   • Smoking status: Former     Years: 15.00     Types: Cigarettes     Quit date:      Years since quittin.2   • Smokeless tobacco: Never   Vaping Use   • Vaping status: Never Used   Substance Use Topics   • Alcohol use: Not Currently   • Drug use: Never       Family History: He indicated that his biological mother is . He indicated that his biological father is .  He indicated that the status of his biological brother is unknown. He indicated that his maternal grandmother is . He indicated that his maternal grandfather is .      Allergies:Acetaminophen, Lisinopril, and Oxycodone    Current Medications:    Outpatient Encounter Medications as of 3/24/2023:   •  atorvastatin (LIPITOR) 40 mg tablet, Take 40 mg by mouth nightly.  •  fluticasone propionate (FLONASE) 50 mcg/actuation nasal spray, Administer 2 sprays into each nostril as needed.  •  folic acid (FOLVITE) 1 mg tablet, Take 1 mg by mouth daily.  •  furosemide (LASIX) 20 mg tablet, Take 1 tablet (20 mg total) by mouth 3 (three) times a week (Mon, Wed, Fri). (Patient taking differently: Take 20 mg by mouth every other day.)  •  hydrOXYchloroQUINE (PLAQUENIL) 200 mg tablet, Take 400 mg by mouth daily.  •  METHOTREXATE SODIUM ORAL, Inject into the shoulder, thigh, or buttocks once a week.  •  metoprolol tartrate (LOPRESSOR) 25 mg tablet, Take 12.5 mg by mouth every 12 (twelve) hours.  •  oxybutynin XL (DITROPAN XL) 15 mg 24 hr tablet, TAKE ONE TABLET BY MOUTH IN THE MORNING AS DIRECTED  •  pantoprazole (PROTONIX) 40 mg EC tablet, Take 40 mg by mouth as needed.  •  PRADAXA 150 mg capsu, Take 1 capsule (150 mg total) by mouth every 12 (twelve) hours.  •  tamsulosin (FLOMAX) 0.4 mg capsule, Take 0.4 mg by mouth daily.  •  testosterone cypionate (DEPO-TESTOTERONE) 200 mg/mL injection, every 28 (twentyeight) days.  •  inFLIXimab (REMICADE) 100 mg injection, Infuse into a venous catheter.                                                          OBJECTIVE   ROS as in HPI   Constitution: Negative for chills and fever.   Eyes: Negative for blurred vision and visual disturbance.   Cardiovascular: Negative for chest pain, dyspnea on exertion, near-syncope, palpitations and syncope.   Respiratory: Negative for hemoptysis, longstanding shortness of breath as noted above.  Hematologic/Lymphatic: Negative for bleeding  "problem.   Skin: Negative for rash. No new skin changes  Gastrointestinal: Negative for abdominal pain, diarrhea, hematochezia, melena, nausea and vomiting.   Genitourinary: Negative for dysuria and hematuria.   Neurological: Negative for headaches.   Bilateral pedal/ankle edema improved   diffuse body aches and arthralgias         Vitals:    03/24/23 1040   BP: 130/80   BP Location: Left upper arm   Patient Position: Sitting   Pulse: 83   Resp: 16   Weight: 131 kg (289 lb)   Height: 1.803 m (5' 10.98\")       BP Readings from Last 3 Encounters:   03/24/23 130/80   09/23/22 102/60   08/24/22 132/78     Wt Readings from Last 3 Encounters:   03/24/23 131 kg (289 lb)   09/23/22 122 kg (269 lb 3.2 oz)   08/24/22 122 kg (268 lb)       Physical Exam   Constitutional: Appears comfortable in no distress  HEENT:  Neck Supple.  No JVD upright no carotid bruits   Head: Normocephalic.   Eyes: Extraocular movements intact  Cardiovascular: Normal rate, regular rhythm 1 out of 6 systolic murmur left sternal border Exam reveals no friction rub.    Pulmonary/Chest: Effort normal and breath sounds normal. No wheezes.  Abdominal: Soft and nontender. Bowel sounds are normal.   Musculoskeletal: Trace ankle edema right greater than left  Neurological: Alert and oriented to person, place, and time.   Skin: Skin is warm and dry.   Psychiatric: Behavior is normal.            Objective   Lab Results   Component Value Date    CHOL 101 08/31/2022     Lab Results   Component Value Date    HDL 33 (L) 08/31/2022     Lab Results   Component Value Date    LDLCALC 52 08/31/2022     Lab Results   Component Value Date    TRIG 81 08/31/2022     Lab Results   Component Value Date    ALT 10 08/31/2022    AST 11 08/31/2022     Lab Results   Component Value Date    WBC 8.4 08/31/2022    HGB 10.0 (L) 08/31/2022    HCT 32.8 (L) 08/31/2022     (H) 08/31/2022     Lab Results   Component Value Date    GLUCOSE 160 (H) 08/31/2022     08/31/2022    K " 4.8 08/31/2022    CO2 26 08/31/2022     08/31/2022    BUN 9 08/31/2022    CREATININE 0.79 08/31/2022     No results found for: HGBA1C  Lab Results   Component Value Date    TSH 0.50 08/31/2022     Lab Results   Component Value Date     (H) 08/31/2022     [unfilled]    Troponin I Results    No lab values to display.       No results found for: HSTROPONINI                                                       IMAGING     Trans thoracic echocardiogram [facility] 6-    Conclusions:   Normal left ventricular size. Normal left ventricular wall thickness. Normal left   ventricular systolic function. LV Ejection Fraction was 55 %.     Findings:   Study Quality:   Technically Difficult. Intravenous contrast utilized to enhance endocardial visualization.   Left Ventricle:   Normal left ventricular size. Normal left ventricular wall thickness. Normal left   ventricular systolic function. LV Ejection Fraction was 55 %.   Diastolic Function:   Mitral valve inflow pattern and Tissue Doppler imaging within normal limits for age.   Right Ventricle:   Normal right ventricular systolic function. Normal right ventricular size. TAPSE 2.9 cm.   Left Atrium:   The left atrium is normal in size. Left atrial volume index 11 ml/m2.   Right Atrium:   The right atrium is normal in size. Right Atrial Volume Index 7 ml/m2.   Atrial Septum:   Not well visualized.   Mitral Valve:   Mild mitral annular calcification. Mitral valve leaflets appear mildly thickened. Trivial   mitral regurgitation.   Aortic Valve:   Trileaflet aortic valve. No aortic regurgitation. No hemodynamically significant aortic   stenosis.   Tricuspid Valve:   Normal appearance of the tricuspid valve. There is trivial tricuspid regurgitation. RV   systolic pressure cannot be determined due to lack of a tricuspid regurgitation Doppler   signal.   Pulmonic Valve:   Normal appearance of the pulmonic valve.   Pericardium:   Normal pericardium with no  significant pericardial effusion.   Aorta:   Normal-sized aortic root and ascending aorta.   IVC:   Normal size IVC with respiratory collapse consistent with a right atrial pressure of 3   mmHg. IVC diameter 1.6 cm.        Coronary CTA 4-  1.  Technically difficult study.  Significant artifact affects interpretation.  Calcified plaque seen in the left main as well as the proximal left anterior  descending and circumflex.  The vessels appear most likely patent.  However,  severity of any stenosis could not be definitively determined due to significant  artifact.  Clinical correlation suggested.  Distal vessels not well-visualized.    LEFT MAIN: Moderate amount of circumferential calcified plaque seen.  There  appears to be a mild (30-50%) associated stenosis.  However, due to artifact,  the exact severity of stenosis could not be determined..  The vessel appears  most likely patent.     LAD:  Proximal: Diffuse calcified plaque seen.  Severity of stenosis could not be  determined due to artifact..  The vessel appears most likely patent.  Mid: Diffuse calcified plaque seen.  Severity of stenosis cannot be determined  due to artifact..  The vessel appears most likely patent.  Distal: Not well seen..  DIAG 1: Not well seen..  DIAG 2: Not well seen..     LCX:  Proximal: Diffuse calcified plaque seen.  The severity of stenosis could not be  determined due to artifact.  The vessel appears most likely patent.  Mid: Calcified plaque seen.  Severity of stenosis cannot be determined due to  artifact..  The vessel appears most likely patent.  Distal: Not well seen.  OM1:  Not well seen.  LPLB: Not well seen.  LPDA: Not well seen.     RCA:  Right coronary artery not well-visualized due to motion artifact..  No calcified  plaque seen.  Small nondominant vessel.     2.  Cardiac Structures: Normal.  3.  Normal left ventricular systolic function.  4.  Coronary calcium score 837.  This places the patient in the DIAZ 85th  risk  percentile for age and gender matched individuals.  5.  Overall quality of the scan was poor.         EKG 3/24/2023   Sinus  Rhythm 83bpm [id number]   - PVC                                                   ASSESSMENT AND PLAN   Mr. Pappas is a 68-year-old gentleman with the following issues:    Assessment/Plan    Coronary artery disease involving native coronary artery of native heart without angina pectoris  CTA 4-2021 shows no high-grade lesions but visualization limited.      Patient has extensive family history of coronary artery disease and ongoing symptoms.  For definitive evaluation he has agreed to proceed with right and left heart catheterization.      He is on Pradaxa 150 mg twice daily.  Was not continued on aspirin previously by cardiology as he is on Pradaxa.  Continue with metoprolol tartrate and atorvastatin 40 mg daily    PAF (paroxysmal atrial fibrillation) (CMS/[facility])  Currently in sinus rhythm.  Continue with Pradaxa.  Denies any abnormal bleeding.  This was also explained to him-that because of his high risk profile and CHADS2 score anticoagulation is recommended indefinitely    Primary hypertension  Metoprolol tartrate 12.5 mg p.o. twice daily    Hyperlipidemia  Atorvastatin 40 mg daily    Lower extremity edema  Lower extremity/pedal edema which is his main complaint.  Now resolved but secondary to rheumatologic issues/gout.      Mildly elevated BNP.  Continue with furosemide 20 mg every other day.          SOB (shortness of breath)  Shortness of breath is multifactorial related to weight sleep apnea and possible sinus issues.    In terms of sleep apnea he is waiting for replacement machine and was given a name to establish care with a sleep medicine doctor.  His previous sleep medicine physician left [place].    As noted above patient is being scheduled for coronary angiogram for definitive evaluation of coronary artery disease as a contributing factor to his shortness of breath.  No  chest pain or unstable symptoms.  He will also have a right heart catheterization at that time as well             Return in about 3 months (around 6/24/2023).    I personally spent total 41 minutes face to face with the patient in counseling, review of records and discussion and/or coordination of care as described above.          Thank you for allowing me to participate in the care of this patient.  I hope this information is helpful.         Ajay Bhat MD [facility]  3/24/2023  11:38 AM    This document was generated utilizing voice recognition technology. A reasonable attempt at proofreading has been made to minimize errors but please excuse any typographical errors which may be present. Please call with any questions.

## 2023-03-27 NOTE — PROGRESS NOTES
I called the pt and he wanted to do it sooner rather than later. Pt is scheduled for 4/6/2023 with Dr Cowart.

## 2023-03-30 ENCOUNTER — TELEPHONE (OUTPATIENT)
Dept: CARDIOLOGY | Facility: CLINIC | Age: 68
End: 2023-03-30
Payer: MEDICARE

## 2023-03-30 LAB
ALBUMIN SERPL-MCNC: 4.4 G/DL (ref 3.6–5.1)
ALBUMIN/GLOB SERPL: 1.5 (CALC) (ref 1–2.5)
ALP SERPL-CCNC: 65 U/L (ref 35–144)
ALT SERPL-CCNC: 29 U/L (ref 9–46)
AST SERPL-CCNC: 22 U/L (ref 10–35)
BILIRUB SERPL-MCNC: 1 MG/DL (ref 0.2–1.2)
BUN SERPL-MCNC: 13 MG/DL (ref 7–25)
BUN/CREAT SERPL: ABNORMAL (CALC) (ref 6–22)
CALCIUM SERPL-MCNC: 9.3 MG/DL (ref 8.6–10.3)
CHLORIDE SERPL-SCNC: 103 MMOL/L (ref 98–110)
CHOLEST SERPL-MCNC: 134 MG/DL
CHOLEST/HDLC SERPL: 2.1 (CALC)
CO2 SERPL-SCNC: 30 MMOL/L (ref 20–32)
CREAT SERPL-MCNC: 0.81 MG/DL (ref 0.7–1.28)
EGFRCR SERPLBLD CKD-EPI 2021: 95 ML/MIN/1.73M2
ERYTHROCYTE [DISTWIDTH] IN BLOOD BY AUTOMATED COUNT: 12.7 % (ref 11–15)
GLOBULIN SER CALC-MCNC: 2.9 G/DL (CALC) (ref 1.9–3.7)
GLUCOSE SERPL-MCNC: 102 MG/DL (ref 65–99)
HCT VFR BLD AUTO: 44.6 % (ref 38.5–50)
HDLC SERPL-MCNC: 63 MG/DL
HGB BLD-MCNC: 14.7 G/DL (ref 13.2–17.1)
LDLC SERPL CALC-MCNC: 56 MG/DL (CALC)
MCH RBC QN AUTO: 30.2 PG (ref 27–33)
MCHC RBC AUTO-ENTMCNC: 33 G/DL (ref 32–36)
MCV RBC AUTO: 91.8 FL (ref 80–100)
NONHDLC SERPL-MCNC: 71 MG/DL (CALC)
PLATELET # BLD AUTO: 254 THOUSAND/UL (ref 140–400)
PMV BLD REES-ECKER: 10.2 FL (ref 7.5–12.5)
POTASSIUM SERPL-SCNC: 4.5 MMOL/L (ref 3.5–5.3)
PROT SERPL-MCNC: 7.3 G/DL (ref 6.1–8.1)
RBC # BLD AUTO: 4.86 MILLION/UL (ref 4.2–5.8)
SARS-COV-2 RNA RESP QL NAA+PROBE: NOT DETECTED
SODIUM SERPL-SCNC: 140 MMOL/L (ref 135–146)
TRIGL SERPL-MCNC: 73 MG/DL
WBC # BLD AUTO: 7.7 THOUSAND/UL (ref 3.8–10.8)

## 2023-03-30 NOTE — TELEPHONE ENCOUNTER
I called patient-electrolytes prior to catheterization within normal limits.  He is scheduled for procedure next Thursday and is aware that I will not be here that week.  He is currently scheduled with Dr. Cowart to have catheterization.

## 2023-04-06 ENCOUNTER — HOSPITAL ENCOUNTER (OUTPATIENT)
Facility: HOSPITAL | Age: 68
Setting detail: HOSPITAL OUTPATIENT SURGERY
Discharge: HOME | End: 2023-04-06
Attending: INTERNAL MEDICINE | Admitting: INTERNAL MEDICINE
Payer: MEDICARE

## 2023-04-06 VITALS
DIASTOLIC BLOOD PRESSURE: 60 MMHG | HEART RATE: 82 BPM | OXYGEN SATURATION: 98 % | SYSTOLIC BLOOD PRESSURE: 130 MMHG | BODY MASS INDEX: 39.2 KG/M2 | RESPIRATION RATE: 24 BRPM | HEIGHT: 71 IN | TEMPERATURE: 97 F | WEIGHT: 280 LBS

## 2023-04-06 DIAGNOSIS — I10 ESSENTIAL (PRIMARY) HYPERTENSION: ICD-10-CM

## 2023-04-06 DIAGNOSIS — I48.0 PAF (PAROXYSMAL ATRIAL FIBRILLATION) (CMS/HCC): ICD-10-CM

## 2023-04-06 LAB
ATRIAL RATE: 69
P AXIS: 63
POCT OXYHGB: 72 % (ref 93–98)
POCT TEST: ABNORMAL
PR INTERVAL: 176
QRS DURATION: 88
QT INTERVAL: 478
QTC CALCULATION(BAZETT): 512
R AXIS: 5
T WAVE AXIS: 45
VENTRICULAR RATE: 69

## 2023-04-06 PROCEDURE — 99152 MOD SED SAME PHYS/QHP 5/>YRS: CPT | Performed by: INTERNAL MEDICINE

## 2023-04-06 PROCEDURE — 71000011 HC PACU PHASE 1 EA ADDL MIN: Performed by: INTERNAL MEDICINE

## 2023-04-06 PROCEDURE — 93456 R HRT CORONARY ARTERY ANGIO: CPT | Mod: 26 | Performed by: INTERNAL MEDICINE

## 2023-04-06 PROCEDURE — 27200000 HC STERILE SUPPLY: Performed by: INTERNAL MEDICINE

## 2023-04-06 PROCEDURE — 93571 IV DOP VEL&/PRESS C FLO 1ST: CPT | Mod: 52 | Performed by: INTERNAL MEDICINE

## 2023-04-06 PROCEDURE — 63600105 HC IODINE BASED CONTRAST: Performed by: INTERNAL MEDICINE

## 2023-04-06 PROCEDURE — 25000000 HC PHARMACY GENERAL: Performed by: INTERNAL MEDICINE

## 2023-04-06 PROCEDURE — 63600000 HC DRUGS/DETAIL CODE: Performed by: INTERNAL MEDICINE

## 2023-04-06 PROCEDURE — 4A033BC MEASUREMENT OF ARTERIAL PRESSURE, CORONARY, PERCUTANEOUS APPROACH: ICD-10-PCS | Performed by: INTERNAL MEDICINE

## 2023-04-06 PROCEDURE — 93010 ELECTROCARDIOGRAM REPORT: CPT | Performed by: INTERNAL MEDICINE

## 2023-04-06 PROCEDURE — C1894 INTRO/SHEATH, NON-LASER: HCPCS | Performed by: INTERNAL MEDICINE

## 2023-04-06 PROCEDURE — C1769 GUIDE WIRE: HCPCS | Performed by: INTERNAL MEDICINE

## 2023-04-06 PROCEDURE — 4A023N6 MEASUREMENT OF CARDIAC SAMPLING AND PRESSURE, RIGHT HEART, PERCUTANEOUS APPROACH: ICD-10-PCS | Performed by: INTERNAL MEDICINE

## 2023-04-06 PROCEDURE — 63700000 HC SELF-ADMINISTRABLE DRUG: Performed by: INTERNAL MEDICINE

## 2023-04-06 PROCEDURE — 93456 R HRT CORONARY ARTERY ANGIO: CPT | Performed by: INTERNAL MEDICINE

## 2023-04-06 PROCEDURE — C1887 CATHETER, GUIDING: HCPCS | Performed by: INTERNAL MEDICINE

## 2023-04-06 PROCEDURE — 71000001 HC PACU PHASE 1 INITIAL 30MIN: Performed by: INTERNAL MEDICINE

## 2023-04-06 PROCEDURE — 85347 COAGULATION TIME ACTIVATED: CPT | Performed by: INTERNAL MEDICINE

## 2023-04-06 PROCEDURE — B2111ZZ FLUOROSCOPY OF MULTIPLE CORONARY ARTERIES USING LOW OSMOLAR CONTRAST: ICD-10-PCS | Performed by: INTERNAL MEDICINE

## 2023-04-06 PROCEDURE — 93571 IV DOP VEL&/PRESS C FLO 1ST: CPT | Mod: 26,52,LD | Performed by: INTERNAL MEDICINE

## 2023-04-06 PROCEDURE — 93005 ELECTROCARDIOGRAM TRACING: CPT | Performed by: INTERNAL MEDICINE

## 2023-04-06 RX ORDER — HEPARIN SODIUM 1000 [USP'U]/ML
INJECTION, SOLUTION INTRAVENOUS; SUBCUTANEOUS
Status: DISCONTINUED | OUTPATIENT
Start: 2023-04-06 | End: 2023-04-06 | Stop reason: HOSPADM

## 2023-04-06 RX ORDER — ASPIRIN 325 MG
325 TABLET ORAL ONCE
Status: COMPLETED | OUTPATIENT
Start: 2023-04-06 | End: 2023-04-06

## 2023-04-06 RX ORDER — IODIXANOL 320 MG/ML
INJECTION, SOLUTION INTRAVASCULAR
Status: DISCONTINUED | OUTPATIENT
Start: 2023-04-06 | End: 2023-04-06 | Stop reason: HOSPADM

## 2023-04-06 RX ORDER — LIDOCAINE HYDROCHLORIDE 10 MG/ML
INJECTION, SOLUTION INFILTRATION; PERINEURAL
Status: DISCONTINUED | OUTPATIENT
Start: 2023-04-06 | End: 2023-04-06 | Stop reason: HOSPADM

## 2023-04-06 RX ORDER — MIDAZOLAM HYDROCHLORIDE 2 MG/2ML
INJECTION, SOLUTION INTRAMUSCULAR; INTRAVENOUS
Status: DISCONTINUED | OUTPATIENT
Start: 2023-04-06 | End: 2023-04-06 | Stop reason: HOSPADM

## 2023-04-06 RX ADMIN — ASPIRIN 325 MG: 325 TABLET ORAL at 08:14

## 2023-04-06 ASSESSMENT — ENCOUNTER SYMPTOMS: ALLERGIC REACTION: 1

## 2023-04-06 NOTE — POST-PROCEDURE NOTE
Pt has met criteria for discharge to home. Discharge instructions reviewed with pt, no further questions.

## 2023-04-06 NOTE — POST-PROCEDURE NOTE
Pt has met criteria for discharge right wrist and radial site free from bleeding or hematoma. Positive pulse. Plan is to meet wife at the front lobby to drive pt home.

## 2023-04-06 NOTE — Clinical Note
Closure device placed for the right radial artery. Closure device used: radial compression system. Hemostasis achieved.

## 2023-04-06 NOTE — POST-PROCEDURE NOTE
TR band removed from right wrist. No bleeding or hematoma noted. Positive pulse. Right radial site dressed with venessa patch and tegaderm. Armboard applied to right wrist.

## 2023-04-06 NOTE — Clinical Note
Patient placed on procedure table in supine position with right arm extended. Positioning devices: safety strap applied.

## 2023-04-06 NOTE — DISCHARGE INSTRUCTIONS
Post cardiac catheterization instructions:  No driving, operating heavy machinery, making critical decisions or activities that require balance for 24 hours.  This is because of sedation you received for your procedure.  On day of discharge, limit activities.  No heavy lifting greater than 10 lbs for the next 2 days after procedure.    Remove dressing next day. Keep site clean and dry.  May shower next day of procedure. Do not submerge catherization site in pool or tub baths for 5 days  Call if  swelling, bleeding, fever or drainage from catheterization site        Medications: Take medications as directed.  Call your physician in any questions or concerns      Follow up with  Dr. Bhat   [phone]  Follow up with

## 2023-04-06 NOTE — PRE-PROCEDURE NOTE
Cardiac Cath Lab Pre-procedure Note    - Patient was seen and examined at bedside.  - The patient's chart and all data was reviewed.  - The procedure, treatment alternatives, risks and benefits were explained with specific risks discussed.  - Patient was consented for cardiac cath procedure and possible PCI.  - Patient's case was found appropriate for dual antiplatelet therapy.    Indication for procedure: Pre-Op Diagnosis Codes:     * PAF (paroxysmal atrial fibrillation) (CMS/[facility]) (I48.0)     * Essential (primary) hypertension (I10)    Patient's clinical presentation to the cardiac cath lab: DELA CRUZ is anginal equivalent.    Patient is at risk for stroke due to extensive atherosclerotic vascular disease; acute myocardial infarction; vascular complications due to the presence of severe peripheral arterial disease in the iliac/femoral vessels increasing the risk of hematoma, retroperitoneal bleeding, pseudo-aneurysms and arteriovenous fistula formation; bleeding; renal failure potentially requiring dialysis due to the presence of pre-procedural abnormal renal function; allergic reaction including anaphylaxis due to known history of allergy to contrast agents containing iodine and emergency cardiac surgery.   Patient appears to be managing well.     Notable Non-Invasive Cardiac Testing        - CT coronary angiogram: unclear severity           Total anti-anginal medications: none.         Pre-sedation assessment  ASA 2  Mallampati class: III - soft palate, base of uvula visible.

## 2023-04-06 NOTE — Clinical Note
The right groin was and prepped with ChloraPrep. The patient was draped in a sterile fashion after allowing for the recommended dry time.

## 2023-04-06 NOTE — POST-PROCEDURE NOTE
Cardiovascular Post Procedure Note:    Clint Pappas  4/6/2023    Pre-op Diagnosis     * PAF (paroxysmal atrial fibrillation) (CMS/HCC) [I48.0]     * Essential (primary) hypertension [I10]   Post-op Diagnosis     * PAF (paroxysmal atrial fibrillation) (CMS/HCC) [I48.0]     * Essential (primary) hypertension [I10]     Procedure(s):  RIGHT & LEFT HEART CATH WITH CORONARY ANGIOGRAPHY  iFR - initial vessel   Surgeon(s):  Werner Cowart MD Barry, David C, MD [place] Jayanna, Manju, MD    Findings:Non-obstructive coronary artery disease.     Anesthesia:  Moderate Sedation    Staff:   Circulator: Oscar Tyler, RN  Documenter: Venessa Perdue RN     Estimated Blood Loss:   5 mL     Specimens:   No specimens collected during this procedure.     Implants:   Nothing was implanted during the procedure     Complications:  None    Date: 4/6/2023 Time: 9:44 AM

## 2023-04-12 ENCOUNTER — TELEPHONE (OUTPATIENT)
Dept: CARDIOLOGY | Facility: CLINIC | Age: 68
End: 2023-04-12
Payer: MEDICARE

## 2023-04-12 NOTE — TELEPHONE ENCOUNTER
----- Message from Ajay Bhat MD sent at 4/12/2023 10:03 AM EDT -----  Patient just had catheterization.  Please ask him if he can come in April 26th at 9:20 AM or 11:50 AM for follow-up.  If he cannot then he can keep his June appointment.

## 2023-04-13 LAB
POCT ACT-LR: >400 SEC (ref 113–149)
POCT TEST: ABNORMAL

## 2023-04-26 ENCOUNTER — TELEPHONE (OUTPATIENT)
Dept: SCHEDULING | Facility: CLINIC | Age: 68
End: 2023-04-26
Payer: MEDICARE

## 2023-04-26 ENCOUNTER — OFFICE VISIT (OUTPATIENT)
Dept: CARDIOLOGY | Facility: CLINIC | Age: 68
End: 2023-04-26
Payer: MEDICARE

## 2023-04-26 VITALS
SYSTOLIC BLOOD PRESSURE: 122 MMHG | HEIGHT: 71 IN | RESPIRATION RATE: 18 BRPM | DIASTOLIC BLOOD PRESSURE: 74 MMHG | WEIGHT: 298 LBS | BODY MASS INDEX: 41.72 KG/M2 | HEART RATE: 68 BPM

## 2023-04-26 DIAGNOSIS — R06.02 SOB (SHORTNESS OF BREATH): ICD-10-CM

## 2023-04-26 DIAGNOSIS — R60.0 LOWER EXTREMITY EDEMA: ICD-10-CM

## 2023-04-26 DIAGNOSIS — I25.10 CORONARY ARTERY DISEASE INVOLVING NATIVE CORONARY ARTERY OF NATIVE HEART WITHOUT ANGINA PECTORIS: ICD-10-CM

## 2023-04-26 DIAGNOSIS — I48.0 PAF (PAROXYSMAL ATRIAL FIBRILLATION) (CMS/HCC): Primary | ICD-10-CM

## 2023-04-26 DIAGNOSIS — E78.5 HYPERLIPIDEMIA, UNSPECIFIED HYPERLIPIDEMIA TYPE: ICD-10-CM

## 2023-04-26 DIAGNOSIS — I10 PRIMARY HYPERTENSION: ICD-10-CM

## 2023-04-26 PROCEDURE — 93000 ELECTROCARDIOGRAM COMPLETE: CPT | Performed by: INTERNAL MEDICINE

## 2023-04-26 PROCEDURE — 99215 OFFICE O/P EST HI 40 MIN: CPT | Performed by: INTERNAL MEDICINE

## 2023-04-26 PROCEDURE — G8754 DIAS BP LESS 90: HCPCS | Performed by: INTERNAL MEDICINE

## 2023-04-26 PROCEDURE — G8752 SYS BP LESS 140: HCPCS | Performed by: INTERNAL MEDICINE

## 2023-04-26 ASSESSMENT — CHADS2 SCORE
AGE: 65-74 (+1PT.)
HYPERTENSION: YES (+1 PT.)
CHADS2 SCORE: 2
SEX: MALE
DIABETES: NO
VASCULAR DISEASE: NO
CHF: NO
PRIOR STROKE OR TIA OR THROMBOEMBOLISM: NO

## 2023-04-26 NOTE — TELEPHONE ENCOUNTER
Pt called to inform Dr. Bhat that the blood thinner that he is currently taking is Pradaxa 150mg     Pt wants a call back to discuss if he should continue taking the blood thinner.    Pt can be reached at [phone]

## 2023-04-26 NOTE — ASSESSMENT & PLAN NOTE
Shortness of breath is multifactorial related to weight sleep apnea and possible sinus issues.    In terms of sleep apnea he is waiting for replacement machine and was given a name to establish care with a sleep medicine doctor.  His previous sleep medicine physician left [place].  However he does not think he wishes to use CPAP and is not planning on it at this time.    Coronary angiogram/catheterization results reviewed with patient.  Wedge pressure 12 mmHg.

## 2023-04-26 NOTE — ASSESSMENT & PLAN NOTE
Paroxysmal atrial fibrillation 6-2022 at [facility] when he had a urinary tract infection.    Currently in sinus rhythm.  Continue with Pradaxa.  He says he got samples from his primary care physician's office and is not sure if it is Pradaxa but he takes the medication twice a day.  He was asked to call the office back and confirm.  He says he bruises and bleeds easily on the medication but no significant melena or epistaxis.  We discussed his [id number] score and risk factors, and anticoagulation in light of his increased stroke risk.

## 2023-04-26 NOTE — PROGRESS NOTES
Cardiology Outpatient Note    [facility] HEART [facility] Office  [address] [place] [address]     [facility] [address]  [address] [place] [address]     TEL  [phone]  [facility]     Clint Pappas is a 68 y.o. male patient here for cardiac evaluation.  The patient has seen cardiology at [facility] and Dr. Deven Caban at [facility].  He came here after speaking with his rheumatologist Dr. Masters.    Patient has a history of rheumatoid arthritis, hypertension, obstructive sleep apnea, BPH, who was admitted to [facility] 6-2022 with a urinary tract infection acute renal insufficiency.  However for him his main issue is lower extremity/ankle edema.  He did not have a DVT by ultrasound and had an echocardiogram at that time which showed normal left ventricular function.  He had paroxysmal atrial fibrillation at that time and was loaded with digoxin, reverted to sinus rhythm, digoxin discontinued, and started on Pradaxa.  Patient was then seen again in the emergency room and admitted 6- for pancreatitis.  He was evaluated by gastroenterology and they recommended discontinuation of methotrexate and thiazide diuretic as there may have been drug-induced liver injury with abnormal LFTs.    Patient was being evaluated for bariatric surgery and had an abnormal stress test with Dr. Caban.  He was scheduled for a coronary CTA when at [facility] a  stress test showed inferior ischemia.  He did not want to proceed with cardiac catheterization at that time and appears to have seen Dr. Caban at [facility].  Dr. Caban reviewed the study with imaging cardiologist and patient was not felt to have obstructive coronary artery disease.    Patient was evaluated for lower extremity edema which was ultimately attributed to gout and his rheumatologic issues.  It has improved. Prior  notes indicate the patient was on chlorthalidone which was discontinued because of acute renal injury.  Blood work from  shows creatinine 0.71.  He is currently on furosemide 20 mg every other day.  Repeat creatinine 0.79.       Has obstructive sleep apnea but his CPAP machine was recalled and he does not plan on using it again.  Also saw ENT and has nasal congestion.  Has longstanding shortness of breath but no change.  No fevers chills diaphoresis or chest pain.    Since last visit had cardiac catheterization which showed normal wedge pressure and nonobstructive coronary artery disease.  Patient denies any new symptoms.                                                         [facility]     Medical History:  Past Medical History:   Diagnosis Date   • Arthritis    • Atrial fibrillation (CMS/HCC)    • Edema    • Enlarged prostate    • Gastroesophageal reflux disease without esophagitis    • Hypertension    • Lipid disorder    • ABNER (obstructive sleep apnea)        Surgical History:  Past Surgical History:   Procedure Laterality Date   • CARPAL TUNNEL RELEASE Bilateral    • COLECTOMY     • EYE SURGERY     • NOSE SURGERY         Social History:  Social History     Tobacco Use   • Smoking status: Former     Years: 15.00     Types: Cigarettes     Quit date:      Years since quittin.3   • Smokeless tobacco: Never   Vaping Use   • Vaping status: Never Used   Substance Use Topics   • Alcohol use: Not Currently   • Drug use: Never       Family History: He indicated that his biological mother is . He indicated that his biological father is . He indicated that the status of his biological brother is unknown. He indicated that his maternal grandmother is . He indicated that his maternal grandfather is .      Allergies:Acetaminophen, Lisinopril, and Oxycodone    Current Medications:    Outpatient Encounter Medications as of 2023:   •  atorvastatin (LIPITOR) 40 mg tablet, Take 40 mg by  mouth nightly.  •  fluticasone propionate (FLONASE) 50 mcg/actuation nasal spray, Administer 2 sprays into each nostril as needed.  •  folic acid (FOLVITE) 1 mg tablet, Take 1 mg by mouth daily.  •  furosemide (LASIX) 20 mg tablet, Take 1 tablet (20 mg total) by mouth 3 (three) times a week (Mon, Wed, Fri). (Patient taking differently: Take 20 mg by mouth every other day.)  •  hydrOXYchloroQUINE (PLAQUENIL) 200 mg tablet, Take 400 mg by mouth daily.  •  inFLIXimab (REMICADE) 100 mg injection, Infuse into a venous catheter.  •  METHOTREXATE SODIUM ORAL, Inject into the shoulder, thigh, or buttocks once a week.  •  metoprolol tartrate (LOPRESSOR) 25 mg tablet, Take 12.5 mg by mouth every 12 (twelve) hours.  •  oxybutynin XL (DITROPAN XL) 15 mg 24 hr tablet, TAKE ONE TABLET BY MOUTH IN THE MORNING AS DIRECTED  •  pantoprazole (PROTONIX) 40 mg EC tablet, Take 40 mg by mouth as needed.  •  PRADAXA 150 mg capsu, Take 1 capsule (150 mg total) by mouth every 12 (twelve) hours.  •  tamsulosin (FLOMAX) 0.4 mg capsule, Take 0.4 mg by mouth daily.  •  testosterone cypionate (DEPO-TESTOTERONE) 200 mg/mL injection, every 28 (twentyeight) days.                                                          OBJECTIVE   ROS as in HPI   Constitution: Negative for chills and fever.   Eyes: Negative for blurred vision and visual disturbance.   Cardiovascular: Negative for chest pain, dyspnea on exertion, near-syncope, palpitations and syncope.   Respiratory: Negative for hemoptysis, longstanding shortness of breath as noted above.  Hematologic/Lymphatic: Easy bruising and bleeding on anticoagulant  Skin: Negative for rash. No new skin changes  Gastrointestinal: Negative for abdominal pain, diarrhea, hematochezia, melena, nausea and vomiting.   Genitourinary: Negative for dysuria and hematuria.   Neurological: Negative for headaches.   Bilateral pedal/ankle edema improved   diffuse body aches and arthralgias         Vitals:    04/26/23 0914  "  BP: 122/74   BP Location: Left upper arm   Patient Position: Sitting   Pulse: 68   Resp: 18   Weight: 135 kg (298 lb)   Height: 1.803 m (5' 10.98\")       BP Readings from Last 3 Encounters:   04/26/23 122/74   04/06/23 130/60   03/24/23 130/80     Wt Readings from Last 3 Encounters:   04/26/23 135 kg (298 lb)   04/06/23 127 kg (280 lb)   03/24/23 131 kg (289 lb)       Physical Exam   Constitutional: Appears comfortable in no distress  HEENT:  Neck Supple.  No JVD upright no carotid bruits   Head: Normocephalic.   Eyes: Extraocular movements intact  Cardiovascular: Normal rate, regular rhythm 1 out of 6 systolic murmur left sternal border Exam reveals no friction rub.    Pulmonary/Chest: Effort normal and breath sounds normal. No wheezes.  Abdominal: Soft and nontender. Bowel sounds are normal.   Musculoskeletal: Trace ankle edema right greater than left.  Radial and pulses 2+ bilaterally.  Neurological: Alert and oriented to person, place, and time.   Skin: Skin is warm and dry.   Psychiatric: Behavior is normal.            Objective   Lab Results   Component Value Date    CHOL 134 03/29/2023    CHOL 101 08/31/2022     Lab Results   Component Value Date    HDL 63 03/29/2023    HDL 33 (L) 08/31/2022     Lab Results   Component Value Date    LDLCALC 56 03/29/2023    LDLCALC 52 08/31/2022     Lab Results   Component Value Date    TRIG 73 03/29/2023    TRIG 81 08/31/2022     Lab Results   Component Value Date    ALT 29 03/29/2023    AST 22 03/29/2023     Lab Results   Component Value Date    WBC 7.7 03/29/2023    HGB 14.7 03/29/2023    HCT 44.6 03/29/2023     03/29/2023     Lab Results   Component Value Date    GLUCOSE 102 (H) 03/29/2023     03/29/2023    K 4.5 03/29/2023    CO2 30 03/29/2023     03/29/2023    BUN 13 03/29/2023    CREATININE 0.81 03/29/2023     No results found for: HGBA1C  Lab Results   Component Value Date    TSH 0.50 08/31/2022     Lab Results   Component Value Date     " (H) 08/31/2022     [unfilled]    Troponin I Results    No lab values to display.       No results found for: HSTROPONINI                                                       IMAGING   Cardiac catheterization 4-6-2023  Coronary Angiogram/Left Heart Catheterization   1.  60% focal stenosis of the mid LAD.  iFR negative, 0.97.  2.  Minimal luminal irregularities in the remaining coronary tree.     Right Heart Catheterization  1. Normal right and left heart filling pressures (RA 7, PA 34/16 (22), and PCW 12 mm Hg).  2. Cardiac output and index of 6.56 L/min and 2.7 L/min/m2 respectively.   3. SVR 1080 dynes  4. PVR 1.5 wood units    Trans thoracic echocardiogram [facility] 6-   Normal left ventricular size. Normal left ventricular wall thickness. Normal left   ventricular systolic function. LV Ejection Fraction was 55 %.     Findings:   Study Quality:   Technically Difficult. Intravenous contrast utilized to enhance endocardial visualization.   Left Ventricle:   Normal left ventricular size. Normal left ventricular wall thickness. Normal left   ventricular systolic function. LV Ejection Fraction was 55 %.   Diastolic Function:   Mitral valve inflow pattern and Tissue Doppler imaging within normal limits for age.   Right Ventricle:   Normal right ventricular systolic function. Normal right ventricular size. TAPSE 2.9 cm.   Left Atrium:   The left atrium is normal in size. Left atrial volume index 11 ml/m2.   Right Atrium:   The right atrium is normal in size. Right Atrial Volume Index 7 ml/m2.   Atrial Septum:   Not well visualized.   Mitral Valve:   Mild mitral annular calcification. Mitral valve leaflets appear mildly thickened. Trivial   mitral regurgitation.   Aortic Valve:   Trileaflet aortic valve. No aortic regurgitation. No hemodynamically significant aortic   stenosis.   Tricuspid Valve:   Normal appearance of the tricuspid valve. There is trivial tricuspid regurgitation. RV   systolic pressure cannot be  determined due to lack of a tricuspid regurgitation Doppler   signal.   Pulmonic Valve:   Normal appearance of the pulmonic valve.   Pericardium:   Normal pericardium with no significant pericardial effusion.   Aorta:   Normal-sized aortic root and ascending aorta.   IVC:   Normal size IVC with respiratory collapse consistent with a right atrial pressure of 3   mmHg. IVC diameter 1.6 cm.        Coronary CTA 4-  1.  Technically difficult study.  Significant artifact affects interpretation.  Calcified plaque seen in the left main as well as the proximal left anterior  descending and circumflex.  The vessels appear most likely patent.  However,  severity of any stenosis could not be definitively determined due to significant  artifact.  Clinical correlation suggested.  Distal vessels not well-visualized.    LEFT MAIN: Moderate amount of circumferential calcified plaque seen.  There  appears to be a mild (30-50%) associated stenosis.  However, due to artifact,  the exact severity of stenosis could not be determined..  The vessel appears  most likely patent.     LAD:  Proximal: Diffuse calcified plaque seen.  Severity of stenosis could not be  determined due to artifact..  The vessel appears most likely patent.  Mid: Diffuse calcified plaque seen.  Severity of stenosis cannot be determined  due to artifact..  The vessel appears most likely patent.  Distal: Not well seen..  DIAG 1: Not well seen..  DIAG 2: Not well seen..     LCX:  Proximal: Diffuse calcified plaque seen.  The severity of stenosis could not be  determined due to artifact.  The vessel appears most likely patent.  Mid: Calcified plaque seen.  Severity of stenosis cannot be determined due to  artifact..  The vessel appears most likely patent.  Distal: Not well seen.  OM1:  Not well seen.  LPLB: Not well seen.  LPDA: Not well seen.     RCA:  Right coronary artery not well-visualized due to motion artifact..  No calcified  plaque seen.  Small  nondominant vessel.     2.  Cardiac Structures: Normal.  3.  Normal left ventricular systolic function.  4.  Coronary calcium score 837.  This places the patient in the DIAZ 85th risk  percentile for age and gender matched individuals.  5.  Overall quality of the scan was poor.         EKG 4/26/2023   Sinus rhythm 68bpm [id number]  PVC                                             ASSESSMENT AND PLAN   Mr. Pappas is a 68-year-old gentleman with the following issues:    Assessment/Plan    SOB (shortness of breath)  Shortness of breath is multifactorial related to weight sleep apnea and possible sinus issues.    In terms of sleep apnea he is waiting for replacement machine and was given a name to establish care with a sleep medicine doctor.  His previous sleep medicine physician left [facility].  However he does not think he wishes to use CPAP and is not planning on it at this time.    Coronary angiogram/catheterization results reviewed with patient.  Wedge pressure 12 mmHg.    PAF (paroxysmal atrial fibrillation) (CMS/[facility])  Paroxysmal atrial fibrillation 6-2022 at [facility] when he had a urinary tract infection.    Currently in sinus rhythm.  Continue with Pradaxa.  He says he got samples from his primary care physician's office and is not sure if it is Pradaxa but he takes the medication twice a day.  He was asked to call the office back and confirm.  He says he bruises and bleeds easily on the medication but no significant melena or epistaxis.  We discussed his [id number] score and risk factors, and anticoagulation in light of his increased stroke risk.    Primary hypertension  Metoprolol tartrate 12.5 mg p.o. twice daily    Hyperlipidemia  Atorvastatin 40 mg daily.  Most recent blood work shows LDL less than 60 mg/dL.    Lower extremity edema  Lower extremity/pedal edema which is his main complaint.  Now resolved but secondary to rheumatologic issues/gout.       Continue with furosemide 20 mg every  other day.          Coronary artery disease involving native coronary artery of native heart without angina pectoris  CTA 4-2021 shows no high-grade lesions but visualization limited.  Coronary angiogram 2023 shows 60% LAD lesion which is IFR negative.    Says he has bleeding with anticoagulation for atrial fibrillation.  Aspirin not started at this time.    Continue with metoprolol and atorvastatin.              Return in about 6 months (around 10/26/2023).           Thank you for allowing me to participate in the care of this patient.  I hope this information is helpful.         Ajay Bhat MD [facility]  4/26/2023  9:51 AM    This document was generated utilizing voice recognition technology. A reasonable attempt at proofreading has been made to minimize errors but please excuse any typographical errors which may be present. Please call with any questions.

## 2023-04-26 NOTE — ASSESSMENT & PLAN NOTE
Lower extremity/pedal edema which is his main complaint.  Now resolved but secondary to rheumatologic issues/gout.       Continue with furosemide 20 mg every other day.

## 2023-04-26 NOTE — LETTER
"Chief Complaint   Patient presents with    Urinary Pain     Pt reports painful urination with frequent urination. Pain to R flank.       Pt to triage with steady gait for above complaint.     Pt presents in triage reporting UTI symptoms x 2 weeks. Frequency, pain, urgency to urinate. Pt also reports small hematuria and R flank pain    Protocol ordered    Pt back to lobby, educated on triage process and encourage to alert staff of any changes.     /84   Pulse 74   Temp 36.4 °C (97.5 °F) (Temporal)   Resp 18   Ht 1.753 m (5' 9\")   Wt 98.2 kg (216 lb 7.9 oz)   SpO2 98%   BMI 31.97 kg/m²       " April 26, 2023     Kush Wen MD  [address] [facility] [address]    Patient: Clint Pappas  YOB: 1955  Date of Visit: 4/26/2023      Dear Dr. Wen:    Thank you for referring Clint Pappas to me for evaluation. Below are my notes for this consultation.    If you have questions, please do not hesitate to call me. I look forward to following your patient along with you.         Sincerely,        Ajay Bhat MD        CC: No Recipients    Ajay Bhat MD  4/26/2023  9:52 AM  Signed       Cardiology Outpatient Note    [facility] Office  [address] [place] [address]     [facility]  [facility]  [facility]  [address] [place] [address]     TEL  [phone]  [facility]     Clint Pappas is a 68 y.o. male patient here for cardiac evaluation.  The patient has seen cardiology at [facility] and Dr. Deven Caban at [facility].  He came here after speaking with his rheumatologist Dr. Masters.    Patient has a history of rheumatoid arthritis, hypertension, obstructive sleep apnea, BPH, who was admitted to [facility] 6-2022 with a urinary tract infection acute renal insufficiency.  However for him his main issue is lower extremity/ankle edema.  He did not have a DVT by ultrasound and had an echocardiogram at that time which showed normal left ventricular function.  He had paroxysmal atrial fibrillation at that time and was loaded with digoxin, reverted to sinus rhythm, digoxin discontinued, and started on Pradaxa.  Patient was then seen again in the emergency room and admitted 6- for pancreatitis.  He was evaluated by gastroenterology and they recommended discontinuation of methotrexate and thiazide diuretic as there may have been drug-induced liver injury with abnormal LFTs.    Patient was being evaluated for bariatric surgery and had an abnormal stress test  with Dr. Caban.  He was scheduled for a coronary CTA when at [facility] a  stress test showed inferior ischemia.  He did not want to proceed with cardiac catheterization at that time and appears to have seen Dr. Caban at [facility].  Dr. Caban reviewed the study with imaging cardiologist and patient was not felt to have obstructive coronary artery disease.    Patient was evaluated for lower extremity edema which was ultimately attributed to gout and his rheumatologic issues.  It has improved. Prior notes indicate the patient was on chlorthalidone which was discontinued because of acute renal injury.  Blood work from  shows creatinine 0.71.  He is currently on furosemide 20 mg every other day.  Repeat creatinine 0.79.       Has obstructive sleep apnea but his CPAP machine was recalled and he does not plan on using it again.  Also saw ENT and has nasal congestion.  Has longstanding shortness of breath but no change.  No fevers chills diaphoresis or chest pain.    Since last visit had cardiac catheterization which showed normal wedge pressure and nonobstructive coronary artery disease.  Patient denies any new symptoms.                                                         [facility]     Medical History:  Past Medical History:   Diagnosis Date   • Arthritis    • Atrial fibrillation (CMS/HCC)    • Edema    • Enlarged prostate    • Gastroesophageal reflux disease without esophagitis    • Hypertension    • Lipid disorder    • ABNER (obstructive sleep apnea)        Surgical History:  Past Surgical History:   Procedure Laterality Date   • CARPAL TUNNEL RELEASE Bilateral    • COLECTOMY     • EYE SURGERY     • NOSE SURGERY         Social History:  Social History     Tobacco Use   • Smoking status: Former     Years: 15.00     Types: Cigarettes     Quit date:      Years since quittin.3   • Smokeless tobacco: Never   Vaping Use   • Vaping status: Never Used   Substance Use Topics   • Alcohol use:  Not Currently   • Drug use: Never       Family History: He indicated that his biological mother is . He indicated that his biological father is . He indicated that the status of his biological brother is unknown. He indicated that his maternal grandmother is . He indicated that his maternal grandfather is .      Allergies:Acetaminophen, Lisinopril, and Oxycodone    Current Medications:    Outpatient Encounter Medications as of 2023:   •  atorvastatin (LIPITOR) 40 mg tablet, Take 40 mg by mouth nightly.  •  fluticasone propionate (FLONASE) 50 mcg/actuation nasal spray, Administer 2 sprays into each nostril as needed.  •  folic acid (FOLVITE) 1 mg tablet, Take 1 mg by mouth daily.  •  furosemide (LASIX) 20 mg tablet, Take 1 tablet (20 mg total) by mouth 3 (three) times a week (Mon, Wed, Fri). (Patient taking differently: Take 20 mg by mouth every other day.)  •  hydrOXYchloroQUINE (PLAQUENIL) 200 mg tablet, Take 400 mg by mouth daily.  •  inFLIXimab (REMICADE) 100 mg injection, Infuse into a venous catheter.  •  METHOTREXATE SODIUM ORAL, Inject into the shoulder, thigh, or buttocks once a week.  •  metoprolol tartrate (LOPRESSOR) 25 mg tablet, Take 12.5 mg by mouth every 12 (twelve) hours.  •  oxybutynin XL (DITROPAN XL) 15 mg 24 hr tablet, TAKE ONE TABLET BY MOUTH IN THE MORNING AS DIRECTED  •  pantoprazole (PROTONIX) 40 mg EC tablet, Take 40 mg by mouth as needed.  •  PRADAXA 150 mg capsu, Take 1 capsule (150 mg total) by mouth every 12 (twelve) hours.  •  tamsulosin (FLOMAX) 0.4 mg capsule, Take 0.4 mg by mouth daily.  •  testosterone cypionate (DEPO-TESTOTERONE) 200 mg/mL injection, every 28 (twentyeight) days.                                                          OBJECTIVE   ROS as in HPI   Constitution: Negative for chills and fever.   Eyes: Negative for blurred vision and visual disturbance.   Cardiovascular: Negative for chest pain, dyspnea on exertion, near-syncope,  "palpitations and syncope.   Respiratory: Negative for hemoptysis, longstanding shortness of breath as noted above.  Hematologic/Lymphatic: Easy bruising and bleeding on anticoagulant  Skin: Negative for rash. No new skin changes  Gastrointestinal: Negative for abdominal pain, diarrhea, hematochezia, melena, nausea and vomiting.   Genitourinary: Negative for dysuria and hematuria.   Neurological: Negative for headaches.   Bilateral pedal/ankle edema improved   diffuse body aches and arthralgias         Vitals:    04/26/23 0914   BP: 122/74   BP Location: Left upper arm   Patient Position: Sitting   Pulse: 68   Resp: 18   Weight: 135 kg (298 lb)   Height: 1.803 m (5' 10.98\")       BP Readings from Last 3 Encounters:   04/26/23 122/74   04/06/23 130/60   03/24/23 130/80     Wt Readings from Last 3 Encounters:   04/26/23 135 kg (298 lb)   04/06/23 127 kg (280 lb)   03/24/23 131 kg (289 lb)       Physical Exam   Constitutional: Appears comfortable in no distress  HEENT:  Neck Supple.  No JVD upright no carotid bruits   Head: Normocephalic.   Eyes: Extraocular movements intact  Cardiovascular: Normal rate, regular rhythm 1 out of 6 systolic murmur left sternal border Exam reveals no friction rub.    Pulmonary/Chest: Effort normal and breath sounds normal. No wheezes.  Abdominal: Soft and nontender. Bowel sounds are normal.   Musculoskeletal: Trace ankle edema right greater than left.  Radial and pulses 2+ bilaterally.  Neurological: Alert and oriented to person, place, and time.   Skin: Skin is warm and dry.   Psychiatric: Behavior is normal.            Objective   Lab Results   Component Value Date    CHOL 134 03/29/2023    CHOL 101 08/31/2022     Lab Results   Component Value Date    HDL 63 03/29/2023    HDL 33 (L) 08/31/2022     Lab Results   Component Value Date    LDLCALC 56 03/29/2023    LDLCALC 52 08/31/2022     Lab Results   Component Value Date    TRIG 73 03/29/2023    TRIG 81 08/31/2022     Lab Results "   Component Value Date    ALT 29 03/29/2023    AST 22 03/29/2023     Lab Results   Component Value Date    WBC 7.7 03/29/2023    HGB 14.7 03/29/2023    HCT 44.6 03/29/2023     03/29/2023     Lab Results   Component Value Date    GLUCOSE 102 (H) 03/29/2023     03/29/2023    K 4.5 03/29/2023    CO2 30 03/29/2023     03/29/2023    BUN 13 03/29/2023    CREATININE 0.81 03/29/2023     No results found for: HGBA1C  Lab Results   Component Value Date    TSH 0.50 08/31/2022     Lab Results   Component Value Date     (H) 08/31/2022     [unfilled]    Troponin I Results    No lab values to display.       No results found for: HSTROPONINI                                                       IMAGING   Cardiac catheterization 4-6-2023  Coronary Angiogram/Left Heart Catheterization   1.  60% focal stenosis of the mid LAD.  iFR negative, 0.97.  2.  Minimal luminal irregularities in the remaining coronary tree.     Right Heart Catheterization  1. Normal right and left heart filling pressures (RA 7, PA 34/16 (22), and PCW 12 mm Hg).  2. Cardiac output and index of 6.56 L/min and 2.7 L/min/m2 respectively.   3. SVR 1080 dynes  4. PVR 1.5 wood units    Trans thoracic echocardiogram [facility] 6-   Normal left ventricular size. Normal left ventricular wall thickness. Normal left   ventricular systolic function. LV Ejection Fraction was 55 %.     Findings:   Study Quality:   Technically Difficult. Intravenous contrast utilized to enhance endocardial visualization.   Left Ventricle:   Normal left ventricular size. Normal left ventricular wall thickness. Normal left   ventricular systolic function. LV Ejection Fraction was 55 %.   Diastolic Function:   Mitral valve inflow pattern and Tissue Doppler imaging within normal limits for age.   Right Ventricle:   Normal right ventricular systolic function. Normal right ventricular size. TAPSE 2.9 cm.   Left Atrium:   The left atrium is normal in size. Left atrial  volume index 11 ml/m2.   Right Atrium:   The right atrium is normal in size. Right Atrial Volume Index 7 ml/m2.   Atrial Septum:   Not well visualized.   Mitral Valve:   Mild mitral annular calcification. Mitral valve leaflets appear mildly thickened. Trivial   mitral regurgitation.   Aortic Valve:   Trileaflet aortic valve. No aortic regurgitation. No hemodynamically significant aortic   stenosis.   Tricuspid Valve:   Normal appearance of the tricuspid valve. There is trivial tricuspid regurgitation. RV   systolic pressure cannot be determined due to lack of a tricuspid regurgitation Doppler   signal.   Pulmonic Valve:   Normal appearance of the pulmonic valve.   Pericardium:   Normal pericardium with no significant pericardial effusion.   Aorta:   Normal-sized aortic root and ascending aorta.   IVC:   Normal size IVC with respiratory collapse consistent with a right atrial pressure of 3   mmHg. IVC diameter 1.6 cm.        Coronary CTA 4-  1.  Technically difficult study.  Significant artifact affects interpretation.  Calcified plaque seen in the left main as well as the proximal left anterior  descending and circumflex.  The vessels appear most likely patent.  However,  severity of any stenosis could not be definitively determined due to significant  artifact.  Clinical correlation suggested.  Distal vessels not well-visualized.    LEFT MAIN: Moderate amount of circumferential calcified plaque seen.  There  appears to be a mild (30-50%) associated stenosis.  However, due to artifact,  the exact severity of stenosis could not be determined..  The vessel appears  most likely patent.     LAD:  Proximal: Diffuse calcified plaque seen.  Severity of stenosis could not be  determined due to artifact..  The vessel appears most likely patent.  Mid: Diffuse calcified plaque seen.  Severity of stenosis cannot be determined  due to artifact..  The vessel appears most likely patent.  Distal: Not well seen..  DIAG 1: Not  well seen..  DIAG 2: Not well seen..     LCX:  Proximal: Diffuse calcified plaque seen.  The severity of stenosis could not be  determined due to artifact.  The vessel appears most likely patent.  Mid: Calcified plaque seen.  Severity of stenosis cannot be determined due to  artifact..  The vessel appears most likely patent.  Distal: Not well seen.  OM1:  Not well seen.  LPLB: Not well seen.  LPDA: Not well seen.     RCA:  Right coronary artery not well-visualized due to motion artifact..  No calcified  plaque seen.  Small nondominant vessel.     2.  Cardiac Structures: Normal.  3.  Normal left ventricular systolic function.  4.  Coronary calcium score 837.  This places the patient in the DIAZ 85th risk  percentile for age and gender matched individuals.  5.  Overall quality of the scan was poor.         EKG 4/26/2023   Sinus rhythm 68bpm [id number]  PVC                                             ASSESSMENT AND PLAN   Mr. Pappas is a 68-year-old gentleman with the following issues:    Assessment/Plan    SOB (shortness of breath)  Shortness of breath is multifactorial related to weight sleep apnea and possible sinus issues.    In terms of sleep apnea he is waiting for replacement machine and was given a name to establish care with a sleep medicine doctor.  His previous sleep medicine physician left [place].  However he does not think he wishes to use CPAP and is not planning on it at this time.    Coronary angiogram/catheterization results reviewed with patient.  Wedge pressure 12 mmHg.    PAF (paroxysmal atrial fibrillation) (CMS/[facility])  Paroxysmal atrial fibrillation 6-2022 at [facility] when he had a urinary tract infection.    Currently in sinus rhythm.  Continue with Pradaxa.  He says he got samples from his primary care physician's office and is not sure if it is Pradaxa but he takes the medication twice a day.  He was asked to call the office back and confirm.  He says he bruises and bleeds  easily on the medication but no significant melena or epistaxis.  We discussed his [id number] score and risk factors, and anticoagulation in light of his increased stroke risk.    Primary hypertension  Metoprolol tartrate 12.5 mg p.o. twice daily    Hyperlipidemia  Atorvastatin 40 mg daily.  Most recent blood work shows LDL less than 60 mg/dL.    Lower extremity edema  Lower extremity/pedal edema which is his main complaint.  Now resolved but secondary to rheumatologic issues/gout.       Continue with furosemide 20 mg every other day.          Coronary artery disease involving native coronary artery of native heart without angina pectoris  CTA 4-2021 shows no high-grade lesions but visualization limited.  Coronary angiogram 2023 shows 60% LAD lesion which is IFR negative.    Says he has bleeding with anticoagulation for atrial fibrillation.  Aspirin not started at this time.    Continue with metoprolol and atorvastatin.             Return in about 6 months (around 10/26/2023).           Thank you for allowing me to participate in the care of this patient.  I hope this information is helpful.         Ajay Bhat MD [facility]  4/26/2023  9:51 AM    This document was generated utilizing voice recognition technology. A reasonable attempt at proofreading has been made to minimize errors but please excuse any typographical errors which may be present. Please call with any questions.

## 2023-04-26 NOTE — TELEPHONE ENCOUNTER
I called and spoke with Clint, he stated he is NOT currently taking Pradaxa, he is taking ELIQUIS 5 MG. I informed Dr. Bhat and updated chart to reflect what the patient is currently taking. Thank you.

## 2023-04-26 NOTE — ASSESSMENT & PLAN NOTE
CTA 4-2021 shows no high-grade lesions but visualization limited.  Coronary angiogram 2023 shows 60% LAD lesion which is IFR negative.    Says he has bleeding with anticoagulation for atrial fibrillation.  Aspirin not started at this time.    Continue with metoprolol and atorvastatin.

## 2023-08-21 ENCOUNTER — TELEPHONE (OUTPATIENT)
Dept: SCHEDULING | Facility: CLINIC | Age: 68
End: 2023-08-21
Payer: MEDICARE

## 2023-08-21 NOTE — TELEPHONE ENCOUNTER
Pt called wants to know if he should continue taking Eliquis    Also wants to know if there is a cheaper alternative. Says he may be able to get Pradaxa at a cheaper cost but unsure as of right now     P: [phone]

## 2023-08-21 NOTE — TELEPHONE ENCOUNTER
I called and spoke with patient. At this time he us unsure which medication would be cheaper, Eliquis vs Pradaxa. He is currently taking Eliquis 5 mg twice a day. He is going to reach out to pharmacy today to ask which medication would be most cost effective. He will call us back to let us know and I will send in script. For now he will continue taking Eliquis 5 mg twice a day as he has been.

## 2023-08-28 ENCOUNTER — APPOINTMENT (RX ONLY)
Dept: URBAN - METROPOLITAN AREA CLINIC 28 | Facility: CLINIC | Age: 68
Setting detail: DERMATOLOGY
End: 2023-08-28

## 2023-08-28 DIAGNOSIS — L50.8 OTHER URTICARIA: ICD-10-CM | Status: WORSENING

## 2023-08-28 PROCEDURE — ? PRESCRIPTION

## 2023-08-28 PROCEDURE — 99203 OFFICE O/P NEW LOW 30 MIN: CPT

## 2023-08-28 PROCEDURE — ? PRESCRIPTION MEDICATION MANAGEMENT

## 2023-08-28 PROCEDURE — ? OTC TREATMENT REGIMEN

## 2023-08-28 PROCEDURE — ? COUNSELING

## 2023-08-28 RX ORDER — TRIAMCINOLONE ACETONIDE 1 MG/G
1 CREAM TOPICAL BID
Qty: 454 | Refills: 1 | Status: ERX | COMMUNITY
Start: 2023-08-28

## 2023-08-28 RX ORDER — MULTIVITAMIN/IRON/FOLIC ACID 18MG-0.4MG
1 TABLET ORAL PRN
Qty: 222 | Refills: 1 | Status: ERX | COMMUNITY
Start: 2023-08-28

## 2023-08-28 RX ORDER — LEVOCETIRIZINE DIHYDROCHLORIDE 5 MG/1
TABLET, FILM COATED ORAL
Qty: 30 | Refills: 1 | Status: ERX | COMMUNITY
Start: 2023-08-28

## 2023-08-28 RX ADMIN — Medication 1: at 00:00

## 2023-08-28 RX ADMIN — TRIAMCINOLONE ACETONIDE 1: 1 CREAM TOPICAL at 00:00

## 2023-08-28 RX ADMIN — LEVOCETIRIZINE DIHYDROCHLORIDE: 5 TABLET, FILM COATED ORAL at 00:00

## 2023-08-28 ASSESSMENT — LOCATION DETAILED DESCRIPTION DERM: LOCATION DETAILED: SUPERIOR THORACIC SPINE

## 2023-08-28 ASSESSMENT — LOCATION ZONE DERM: LOCATION ZONE: TRUNK

## 2023-08-28 ASSESSMENT — LOCATION SIMPLE DESCRIPTION DERM: LOCATION SIMPLE: UPPER BACK

## 2023-08-28 NOTE — PROCEDURE: PRESCRIPTION MEDICATION MANAGEMENT
Render In Strict Bullet Format?: No
Initiate Treatment: triamcinolone acetonide 0.1% topical cream: Apply to AA trunk BID X 2 weeks
Detail Level: Simple

## 2023-08-28 NOTE — HPI: SKIN LESION
What Type Of Note Output Would You Prefer (Optional)?: Standard Output
How Severe Is Your Skin Lesion?: moderate
Has Your Skin Lesion Been Treated?: not been treated
Is This A New Presentation, Or A Follow-Up?: Skin Lesions
Additional History: Bumps began on back, spreading, originally thought it was a bug bite, itchy

## 2023-08-31 RX ORDER — LEVOCETIRIZINE DIHYDROCHLORIDE 5 MG/1
TABLET, FILM COATED ORAL
Qty: 90 | Refills: 1 | Status: ERX

## 2023-09-25 ENCOUNTER — APPOINTMENT (RX ONLY)
Dept: URBAN - METROPOLITAN AREA CLINIC 28 | Facility: CLINIC | Age: 68
Setting detail: DERMATOLOGY
End: 2023-09-25

## 2023-09-25 DIAGNOSIS — L85.3 XEROSIS CUTIS: ICD-10-CM

## 2023-09-25 DIAGNOSIS — L21.8 OTHER SEBORRHEIC DERMATITIS: ICD-10-CM | Status: INADEQUATELY CONTROLLED

## 2023-09-25 PROCEDURE — ? PRESCRIPTION MEDICATION MANAGEMENT

## 2023-09-25 PROCEDURE — ? PRESCRIPTION

## 2023-09-25 PROCEDURE — 99214 OFFICE O/P EST MOD 30 MIN: CPT

## 2023-09-25 PROCEDURE — ? COUNSELING

## 2023-09-25 RX ORDER — CLOBETASOL PROPIONATE 0.5 MG/ML
SOLUTION TOPICAL PRN
Qty: 50 | Refills: 2 | Status: ERX | COMMUNITY
Start: 2023-09-25

## 2023-09-25 RX ORDER — AMMONIUM LACTATE 120 MG/G
CREAM TOPICAL
Qty: 385 | Refills: 6 | Status: ERX | COMMUNITY
Start: 2023-09-25

## 2023-09-25 RX ORDER — KETOCONAZOLE 20 MG/ML
SHAMPOO, SUSPENSION TOPICAL QDAY
Qty: 120 | Refills: 3 | Status: ERX | COMMUNITY
Start: 2023-09-25

## 2023-09-25 RX ADMIN — KETOCONAZOLE: 20 SHAMPOO, SUSPENSION TOPICAL at 00:00

## 2023-09-25 RX ADMIN — CLOBETASOL PROPIONATE: 0.5 SOLUTION TOPICAL at 00:00

## 2023-09-25 RX ADMIN — AMMONIUM LACTATE: 120 CREAM TOPICAL at 00:00

## 2023-09-25 ASSESSMENT — LOCATION ZONE DERM
LOCATION ZONE: ARM
LOCATION ZONE: TRUNK
LOCATION ZONE: SCALP

## 2023-09-25 ASSESSMENT — LOCATION SIMPLE DESCRIPTION DERM
LOCATION SIMPLE: LEFT SCALP
LOCATION SIMPLE: CHEST
LOCATION SIMPLE: RIGHT SCALP
LOCATION SIMPLE: LEFT UPPER ARM
LOCATION SIMPLE: FRONTAL SCALP
LOCATION SIMPLE: RIGHT UPPER ARM

## 2023-09-25 ASSESSMENT — LOCATION DETAILED DESCRIPTION DERM
LOCATION DETAILED: RIGHT ANTERIOR PROXIMAL UPPER ARM
LOCATION DETAILED: RIGHT CENTRAL FRONTAL SCALP
LOCATION DETAILED: RIGHT MEDIAL SUPERIOR CHEST
LOCATION DETAILED: LEFT CENTRAL FRONTAL SCALP
LOCATION DETAILED: MEDIAL FRONTAL SCALP
LOCATION DETAILED: LEFT ANTERIOR PROXIMAL UPPER ARM

## 2023-09-25 NOTE — PROCEDURE: PRESCRIPTION MEDICATION MANAGEMENT
Initiate Treatment: ammonium lactate 12 % topical cream \\napply moisturizer on dry areas 1-2 times per day
Detail Level: Simple
Render In Strict Bullet Format?: No
Initiate Treatment: ketoconazole 2 % shampoo QDAY During each hair wash Massage a thin layer onto scalp, let sit for 5-10 minutes then rinse\\n\\nclobetasol 0.05 % scalp solution Apply to dry scalp then rinse after one hour  x 2-3 weeks max/month PRN flare.

## 2023-10-13 ENCOUNTER — HOSPITAL ENCOUNTER (OUTPATIENT)
Dept: CARDIOLOGY | Facility: CLINIC | Age: 68
Setting detail: NUCLEAR MEDICINE
Discharge: HOME | End: 2023-10-13
Attending: INTERNAL MEDICINE
Payer: MEDICARE

## 2023-10-13 ENCOUNTER — OFFICE VISIT (OUTPATIENT)
Dept: CARDIOLOGY | Facility: CLINIC | Age: 68
End: 2023-10-13
Payer: MEDICARE

## 2023-10-13 VITALS
DIASTOLIC BLOOD PRESSURE: 84 MMHG | BODY MASS INDEX: 40.9 KG/M2 | WEIGHT: 302 LBS | TEMPERATURE: 98.3 F | OXYGEN SATURATION: 98 % | HEART RATE: 78 BPM | HEIGHT: 72 IN | SYSTOLIC BLOOD PRESSURE: 128 MMHG | RESPIRATION RATE: 14 BRPM

## 2023-10-13 VITALS
DIASTOLIC BLOOD PRESSURE: 84 MMHG | HEIGHT: 72 IN | SYSTOLIC BLOOD PRESSURE: 128 MMHG | BODY MASS INDEX: 40.9 KG/M2 | WEIGHT: 302 LBS

## 2023-10-13 DIAGNOSIS — I48.0 PAF (PAROXYSMAL ATRIAL FIBRILLATION) (CMS/HCC): ICD-10-CM

## 2023-10-13 DIAGNOSIS — I10 PRIMARY HYPERTENSION: ICD-10-CM

## 2023-10-13 DIAGNOSIS — I25.10 CORONARY ARTERY DISEASE INVOLVING NATIVE CORONARY ARTERY OF NATIVE HEART WITHOUT ANGINA PECTORIS: ICD-10-CM

## 2023-10-13 DIAGNOSIS — I48.0 PAF (PAROXYSMAL ATRIAL FIBRILLATION) (CMS/HCC): Primary | ICD-10-CM

## 2023-10-13 DIAGNOSIS — R60.0 LOWER EXTREMITY EDEMA: ICD-10-CM

## 2023-10-13 DIAGNOSIS — I25.810 ATHEROSCLEROSIS OF CORONARY ARTERY BYPASS GRAFT OF NATIVE HEART WITHOUT ANGINA PECTORIS: ICD-10-CM

## 2023-10-13 DIAGNOSIS — R06.02 SOB (SHORTNESS OF BREATH): ICD-10-CM

## 2023-10-13 LAB
AORTIC ROOT ANNULUS: 3.7 CM
ASCENDING AORTA: 3.3 CM
AV PEAK GRADIENT: 8 MMHG
AV PEAK VELOCITY-S: 1.43 M/S
AV VALVE AREA: 1.41 CM2
BSA FOR ECHO PROCEDURE: 2.64 M2
E WAVE DECELERATION TIME: 222 MS
E/A RATIO: 1.5
E/E' RATIO: 16.3
E/LAT E' RATIO: 11.8
EST RIGHT VENT SYSTOLIC PRESSURE BY TRICUSPID REGURGITATION JET: 50 MMHG
FRACTIONAL SHORTENING: 36.22 %
HEART RATE: 73 BPM
INTERVENTRICULAR SEPTUM: 1.24 CM
LA ESV (BP): 107 CM3
LA ESV INDEX (A2C): 42.42 CM3/M2
LA ESV INDEX (BP): 40.53 CM3/M2
LA/AORTA RATIO: 1.14
LAAS-AP2: 30.9 CM2
LAAS-AP4: 27 CM2
LAD 2D: 4.2 CM
LALD A4C: 6.13 CM
LALD A4C: 6.83 CM
LAV-S: 112 CM3
LEFT ATRIUM VOLUME INDEX: 34.96 CM3/M2
LEFT ATRIUM VOLUME: 92.3 CM3
LEFT INTERNAL DIMENSION IN SYSTOLE: 3.54 CM (ref 7.44–11.29)
LEFT VENTRICULAR INTERNAL DIMENSION IN DIASTOLE: 5.55 CM (ref 13.16–18.29)
LEFT VENTRICULAR POSTERIOR WALL IN END DIASTOLE: 1.2 CM (ref 1.3–2.44)
LVOT 2D: 2 CM
LVOT A: 3.14 CM2
LVOT PEAK VELOCITY: 0.82 M/S
LVOT PG: 3 MMHG
MLH CV ECHO AVA INDEX VELOCITY RATIO: 0.5
MV E'TISSUE VEL-LAT: 0.1 M/S
MV E'TISSUE VEL-MED: 0.07 M/S
MV PEAK A VEL: 0.74 M/S
MV PEAK E VEL: 1.14 M/S
POSTERIOR WALL: 1.2 CM
RAP: 3 MMHG
RVOT VMAX: 0.53 M/S
SEPTAL TISSUE DOPPLER FREE WALL LATE DIA VELOCITY (APICAL 4 CHAMBER VIEW): 0.15 M/S
TR MAX PG: 46.79 MMHG
TRICUSPID VALVE PEAK REGURGITATION VELOCITY: 3.42 M/S
Z-SCORE OF LEFT VENTRICULAR DIMENSION IN END DIASTOLE: -10.28
Z-SCORE OF LEFT VENTRICULAR DIMENSION IN END SYSTOLE: -7.52
Z-SCORE OF LEFT VENTRICULAR POSTERIOR WALL IN END DIASTOLE: -2.04

## 2023-10-13 PROCEDURE — 99215 OFFICE O/P EST HI 40 MIN: CPT | Performed by: INTERNAL MEDICINE

## 2023-10-13 PROCEDURE — 93306 TTE W/DOPPLER COMPLETE: CPT | Performed by: INTERNAL MEDICINE

## 2023-10-13 PROCEDURE — G8754 DIAS BP LESS 90: HCPCS | Performed by: INTERNAL MEDICINE

## 2023-10-13 PROCEDURE — 93000 ELECTROCARDIOGRAM COMPLETE: CPT | Performed by: INTERNAL MEDICINE

## 2023-10-13 PROCEDURE — G8752 SYS BP LESS 140: HCPCS | Performed by: INTERNAL MEDICINE

## 2023-10-13 RX ORDER — DABIGATRAN ETEXILATE MESYLATE 150 MG/1
150 CAPSULE ORAL 2 TIMES DAILY
Qty: 180 CAPSULE | Refills: 1
Start: 2023-10-13 | End: 2024-02-14

## 2023-10-13 RX ORDER — DABIGATRAN ETEXILATE MESYLATE 150 MG/1
CAPSULE ORAL
COMMUNITY
Start: 2023-08-21 | End: 2023-10-13 | Stop reason: SDUPTHER

## 2023-10-13 NOTE — ASSESSMENT & PLAN NOTE
Secondary to rheumatologic issues and gout.  Currently no significant edema.  Continue with furosemide.

## 2023-10-13 NOTE — ASSESSMENT & PLAN NOTE
Multifactorial related to sleep apnea sinus issues and weight.  Coronary catheterization showed wedge pressure 12 mmHg.  Have asked patient to consider using CPAP and following up with pulmonary.

## 2023-10-13 NOTE — ASSESSMENT & PLAN NOTE
CTA 4-2021 shows no high-grade lesions but visualization limited.  Coronary angiogram 2023 shows 60% LAD lesion which is IFR negative.    On Pradaxa 150 mg twice daily-aspirin not started at this time.    Continue with metoprolol and atorvastatin.

## 2023-10-13 NOTE — ASSESSMENT & PLAN NOTE
Paroxysmal atrial fibrillation 6-2022 at [facility] when he had a urinary tract infection.  Currently in sinus rhythm on Pradaxa 150 mg twice daily.  He says that he bruises easily but no significant melena or epistaxis.

## 2023-10-13 NOTE — PROGRESS NOTES
Cardiology Outpatient Note    [facility]  [address] [place] [address]     [facility]  [facility] [address]  [address] [place] [address]     TEL  [phone]  [facility]     Clint Pappas is a 68 y.o. male patient here for cardiac evaluation.  The patient has seen cardiology at [facility] and Dr. Deven Caban at [facility].  He came here after speaking with his rheumatologist Dr. Masters.    Patient has a history of rheumatoid arthritis, hypertension, obstructive sleep apnea, BPH, who was admitted to [facility] 6-2022 with a urinary tract infection acute renal insufficiency.  However for him his main issue is lower extremity/ankle edema.  He did not have a DVT by ultrasound and had an echocardiogram at that time which showed normal left ventricular function.  He had paroxysmal atrial fibrillation at that time and was loaded with digoxin, reverted to sinus rhythm, digoxin discontinued, and started on Pradaxa.  Patient was then seen again in the emergency room and admitted 6- for pancreatitis.  He was evaluated by gastroenterology and they recommended discontinuation of methotrexate and thiazide diuretic as there may have been drug-induced liver injury with abnormal LFTs.    Patient was being evaluated for bariatric surgery and had an abnormal stress test with Dr. Caban.  He was scheduled for a coronary CTA when at [facility] a  stress test showed inferior ischemia.  He did not want to proceed with cardiac catheterization at that time and appears to have seen Dr. Caban at [facility].  Dr. Caban reviewed the study with imaging cardiologist and patient was not felt to have obstructive coronary artery disease.    Patient was evaluated for lower extremity edema which was ultimately attributed to gout and his rheumatologic issues.  It has improved.  He is  currently on furosemide 20 mg 3 times weekly.    Patient's complaint today is that of fatigue.  No fevers chills chest pain palpitations but just fatigue.  He has his baseline shortness of breath.  Previously had declined to use CPAP but says he may consider it now.  He also notes right lower back discomfort which is worse when he moves.  Appears musculoskeletal by his description and examination.  No dysuria.  No melena.  We reviewed his medications.                                                         [facility]     Medical History:  Past Medical History:   Diagnosis Date    Arthritis     Atrial fibrillation (CMS/HCC)     Edema     Enlarged prostate     Gastroesophageal reflux disease without esophagitis     Hypertension     Lipid disorder     ABNER (obstructive sleep apnea)        Surgical History:  Past Surgical History:   Procedure Laterality Date    CARPAL TUNNEL RELEASE Bilateral     COLECTOMY      EYE SURGERY      NOSE SURGERY         Social History:  Social History     Tobacco Use    Smoking status: Former     Years: 15     Types: Cigarettes     Quit date:      Years since quittin.8    Smokeless tobacco: Never   Vaping Use    Vaping Use: Never used   Substance Use Topics    Alcohol use: Not Currently    Drug use: Never       Family History: He indicated that his biological mother is . He indicated that his biological father is . He indicated that the status of his biological brother is unknown. He indicated that his maternal grandmother is . He indicated that his maternal grandfather is .      Allergies:Acetaminophen, Lisinopril, and Oxycodone    Current Medications:    Outpatient Encounter Medications as of 10/13/2023:     atorvastatin (LIPITOR) 40 mg tablet, Take 40 mg by mouth nightly.    fluticasone propionate (FLONASE) 50 mcg/actuation nasal spray, Administer 2 sprays into each nostril as needed.    folic acid (FOLVITE) 1 mg tablet, Take 1 mg by  mouth daily.    furosemide (LASIX) 20 mg tablet, Take 1 tablet (20 mg total) by mouth 3 (three) times a week (Mon, Wed, Fri). (Patient taking differently: Take 20 mg by mouth every other day.)    hydrOXYchloroQUINE (PLAQUENIL) 200 mg tablet, Take 400 mg by mouth daily.    inFLIXimab (REMICADE) 100 mg injection, Infuse into a venous catheter.    METHOTREXATE SODIUM ORAL, Inject into the shoulder, thigh, or buttocks once a week.    metoprolol tartrate (LOPRESSOR) 25 mg tablet, Take 12.5 mg by mouth every 12 (twelve) hours.    oxybutynin XL (DITROPAN XL) 15 mg 24 hr tablet, TAKE ONE TABLET BY MOUTH IN THE MORNING AS DIRECTED    pantoprazole (PROTONIX) 40 mg EC tablet, Take 40 mg by mouth as needed.    PRADAXA 150 mg capsule, Take 1 capsule (150 mg total) by mouth 2 (two) times a day.    tamsulosin (FLOMAX) 0.4 mg capsule, Take 0.4 mg by mouth daily.    testosterone cypionate (DEPO-TESTOTERONE) 200 mg/mL injection, every 28 (twentyeight) days.    [DISCONTINUED] apixaban (ELIQUIS) 5 mg tablet, Take 5 mg by mouth 2 (two) times a day.    [DISCONTINUED] PRADAXA 150 mg capsule,                                                           OBJECTIVE   ROS as in HPI   Constitution: Negative for chills and fever.  Positive fatigue  Eyes: Negative for blurred vision and visual disturbance.   Cardiovascular: Negative for chest pain, dyspnea on exertion, near-syncope, palpitations and syncope.   Respiratory: Negative for hemoptysis, longstanding shortness of breath as noted above.  Hematologic/Lymphatic: No abnormal bleeding  Skin: Negative for rash. No new skin changes  Gastrointestinal: Negative for abdominal pain, diarrhea, hematochezia, melena, nausea and vomiting.   Genitourinary: Negative for dysuria and hematuria.   Neurological: Negative for headaches.   Bilateral pedal/ankle edema improved           Vitals:    10/13/23 0908   BP: 128/84   BP Location: Left upper arm   Patient Position: Sitting   Pulse: 78   Resp:  "14   Temp: 36.8 °C (98.3 °F)   TempSrc: Temporal   SpO2: 98%   Weight: (!) 137 kg (302 lb)   Height: 1.829 m (6')       BP Readings from Last 3 Encounters:   10/13/23 128/84   10/13/23 128/84   04/26/23 122/74     Wt Readings from Last 3 Encounters:   10/13/23 (!) 137 kg (302 lb)   10/13/23 (!) 137 kg (302 lb)   04/26/23 135 kg (298 lb)       Physical Exam   Constitutional: Appears comfortable in no distress  HEENT:  Neck Supple.  No JVD upright no carotid bruits   Head: Normocephalic.   Eyes: Extraocular movements intact  Cardiovascular: Normal rate, regular rhythm 1 out of 6 systolic murmur left sternal border Exam reveals no friction rub.    Pulmonary/Chest: Effort normal and breath sounds normal. No wheezes.  Abdominal: Soft and nontender. Bowel sounds are normal.   Musculoskeletal: No significant lower extremity edema  Neurological: Alert and oriented to person, place, and time.   Skin: Skin is warm and dry.   Psychiatric: Behavior is normal.            Objective   Lab Results   Component Value Date    CHOL 134 03/29/2023    CHOL 101 08/31/2022     Lab Results   Component Value Date    HDL 63 03/29/2023    HDL 33 (L) 08/31/2022     Lab Results   Component Value Date    LDLCALC 56 03/29/2023    LDLCALC 52 08/31/2022     Lab Results   Component Value Date    TRIG 73 03/29/2023    TRIG 81 08/31/2022     Lab Results   Component Value Date    ALT 29 03/29/2023    AST 22 03/29/2023     Lab Results   Component Value Date    WBC 7.7 03/29/2023    HGB 14.7 03/29/2023    HCT 44.6 03/29/2023     03/29/2023     Lab Results   Component Value Date    GLUCOSE 102 (H) 03/29/2023     03/29/2023    K 4.5 03/29/2023    CO2 30 03/29/2023     03/29/2023    BUN 13 03/29/2023    CREATININE 0.81 03/29/2023     No results found for: \"HGBA1C\"  Lab Results   Component Value Date    TSH 0.50 08/31/2022     Lab Results   Component Value Date     (H) 08/31/2022     [unfilled]    Troponin I Results    No lab values to " "display.       No results found for: \"HSTROPONINI\"                                                       IMAGING   Transthoracic echocardiogram 10-   Left Ventricle: Normal ventricle size. Mild concentric left ventricular hypertrophy. Preserved systolic function. Estimated EF 55-60%. Unable to assess regional wall motion abnormalities. Grade II diastolic dysfunction.    Right Ventricle: Normal ventricle size. Normal systolic function.    Left Atrium: Mildly dilated atrium.    Right Atrium: Normal sized atrium.    Aortic Valve: Tricuspid valve.  Sclerotic leaflets. Trace regurgitation. No stenosis.    Mitral Valve: Sclerotic mitral valve. Normal leaflet motion. Mild mitral annular calcification. Mild regurgitation. No stenosis.    Tricuspid Valve: Normal structure. Mild regurgitation. Estimated RVSP = 50 mmHg.    Aorta: Aortic root top normal. Ascending aorta normal-sized.    Pericardium: No evidence of pericardial effusion.         Cardiac catheterization 4-6-2023  Coronary Angiogram/Left Heart Catheterization   1.  60% focal stenosis of the mid LAD.  iFR negative, 0.97.  2.  Minimal luminal irregularities in the remaining coronary tree.     Right Heart Catheterization  1. Normal right and left heart filling pressures (RA 7, PA 34/16 (22), and PCW 12 mm Hg).  2. Cardiac output and index of 6.56 L/min and 2.7 L/min/m2 respectively.   3. SVR 1080 dynes  4. PVR 1.5 wood units    Trans thoracic echocardiogram [facility] 6-   Normal left ventricular size. Normal left ventricular wall thickness. Normal left   ventricular systolic function. LV Ejection Fraction was 55 %.     Findings:   Study Quality:   Technically Difficult. Intravenous contrast utilized to enhance endocardial visualization.   Left Ventricle:   Normal left ventricular size. Normal left ventricular wall thickness. Normal left   ventricular systolic function. LV Ejection Fraction was 55 %.   Diastolic Function:   Mitral valve inflow " pattern and Tissue Doppler imaging within normal limits for age.   Right Ventricle:   Normal right ventricular systolic function. Normal right ventricular size. TAPSE 2.9 cm.   Left Atrium:   The left atrium is normal in size. Left atrial volume index 11 ml/m2.   Right Atrium:   The right atrium is normal in size. Right Atrial Volume Index 7 ml/m2.   Atrial Septum:   Not well visualized.   Mitral Valve:   Mild mitral annular calcification. Mitral valve leaflets appear mildly thickened. Trivial   mitral regurgitation.   Aortic Valve:   Trileaflet aortic valve. No aortic regurgitation. No hemodynamically significant aortic   stenosis.   Tricuspid Valve:   Normal appearance of the tricuspid valve. There is trivial tricuspid regurgitation. RV   systolic pressure cannot be determined due to lack of a tricuspid regurgitation Doppler   signal.   Pulmonic Valve:   Normal appearance of the pulmonic valve.   Pericardium:   Normal pericardium with no significant pericardial effusion.   Aorta:   Normal-sized aortic root and ascending aorta.   IVC:   Normal size IVC with respiratory collapse consistent with a right atrial pressure of 3   mmHg. IVC diameter 1.6 cm.        Coronary CTA 4-  1.  Technically difficult study.  Significant artifact affects interpretation.  Calcified plaque seen in the left main as well as the proximal left anterior  descending and circumflex.  The vessels appear most likely patent.  However,  severity of any stenosis could not be definitively determined due to significant  artifact.  Clinical correlation suggested.  Distal vessels not well-visualized.    LEFT MAIN: Moderate amount of circumferential calcified plaque seen.  There  appears to be a mild (30-50%) associated stenosis.  However, due to artifact,  the exact severity of stenosis could not be determined..  The vessel appears  most likely patent.     LAD:  Proximal: Diffuse calcified plaque seen.  Severity of stenosis could not  be  determined due to artifact..  The vessel appears most likely patent.  Mid: Diffuse calcified plaque seen.  Severity of stenosis cannot be determined  due to artifact..  The vessel appears most likely patent.  Distal: Not well seen..  DIAG 1: Not well seen..  DIAG 2: Not well seen..     LCX:  Proximal: Diffuse calcified plaque seen.  The severity of stenosis could not be  determined due to artifact.  The vessel appears most likely patent.  Mid: Calcified plaque seen.  Severity of stenosis cannot be determined due to  artifact..  The vessel appears most likely patent.  Distal: Not well seen.  OM1:  Not well seen.  LPLB: Not well seen.  LPDA: Not well seen.     RCA:  Right coronary artery not well-visualized due to motion artifact..  No calcified  plaque seen.  Small nondominant vessel.     2.  Cardiac Structures: Normal.  3.  Normal left ventricular systolic function.  4.  Coronary calcium score 837.  This places the patient in the DIAZ 85th risk  percentile for age and gender matched individuals.  5.  Overall quality of the scan was poor.         EKG 10/13/2023   Sinus Rhythm 67bpm  motion aritifact  PRWP                                           ASSESSMENT AND PLAN   Mr. Pappas is a 68-year-old gentleman with the following issues:    Assessment/Plan    Coronary artery disease involving native coronary artery of native heart without angina pectoris  CTA 4-2021 shows no high-grade lesions but visualization limited.  Coronary angiogram 2023 shows 60% LAD lesion which is IFR negative.    On Pradaxa 150 mg twice daily-aspirin not started at this time.    Continue with metoprolol and atorvastatin.    SOB (shortness of breath)  Multifactorial related to sleep apnea sinus issues and weight.  Coronary catheterization showed wedge pressure 12 mmHg.  Have asked patient to consider using CPAP and following up with pulmonary.    PAF (paroxysmal atrial fibrillation) (CMS/[facility])  Paroxysmal atrial fibrillation 6-2022 at  [facility] when he had a urinary tract infection.  Currently in sinus rhythm on Pradaxa 150 mg twice daily.  He says that he bruises easily but no significant melena or epistaxis.    Primary hypertension  Metoprolol tartrate 12.5 mg p.o. twice daily    Lower extremity edema  Secondary to rheumatologic issues and gout.  Currently no significant edema.  Continue with furosemide.    Hyperlipidemia  Atorvastatin 40 mg p.o. daily.    Fatigue  Patient's predominant complaint is fatigue.  He had an echocardiogram in the office today which showed no significant change in left ventricular function.  He has been asked to have blood work done to assess his CBC.  Previous value 6-2023 was within normal limits.  No signs of active bleeding.  If these studies are unremarkable he has been asked to follow-up with his primary care physician or rheumatologist.          Return in about 3 months (around 1/13/2024).           Thank you for allowing me to participate in the care of this patient.  I hope this information is helpful.         Ajay Bhat MD [facility] JACQUI  10/13/2023  9:42 AM    This document was generated utilizing voice recognition technology. A reasonable attempt at proofreading has been made to minimize errors but please excuse any typographical errors which may be present. Please call with any questions.

## 2023-10-14 LAB
ALT SERPL-CCNC: 29 U/L (ref 9–46)
AST SERPL-CCNC: 26 U/L (ref 10–35)
CHOLEST SERPL-MCNC: 140 MG/DL
CHOLEST/HDLC SERPL: 2.4 (CALC)
ERYTHROCYTE [DISTWIDTH] IN BLOOD BY AUTOMATED COUNT: 12.4 % (ref 11–15)
HCT VFR BLD AUTO: 38.9 % (ref 38.5–50)
HDLC SERPL-MCNC: 58 MG/DL
HGB BLD-MCNC: 12.8 G/DL (ref 13.2–17.1)
LDLC SERPL CALC-MCNC: 60 MG/DL (CALC)
MCH RBC QN AUTO: 30 PG (ref 27–33)
MCHC RBC AUTO-ENTMCNC: 32.9 G/DL (ref 32–36)
MCV RBC AUTO: 91.3 FL (ref 80–100)
NONHDLC SERPL-MCNC: 82 MG/DL (CALC)
PLATELET # BLD AUTO: 179 THOUSAND/UL (ref 140–400)
PMV BLD REES-ECKER: 11.4 FL (ref 7.5–12.5)
RBC # BLD AUTO: 4.26 MILLION/UL (ref 4.2–5.8)
TRIGL SERPL-MCNC: 139 MG/DL
WBC # BLD AUTO: 9.2 THOUSAND/UL (ref 3.8–10.8)

## 2023-10-23 ENCOUNTER — APPOINTMENT (RX ONLY)
Dept: URBAN - METROPOLITAN AREA CLINIC 28 | Facility: CLINIC | Age: 68
Setting detail: DERMATOLOGY
End: 2023-10-23

## 2023-10-23 DIAGNOSIS — L21.8 OTHER SEBORRHEIC DERMATITIS: ICD-10-CM

## 2023-10-23 DIAGNOSIS — L85.3 XEROSIS CUTIS: ICD-10-CM

## 2023-10-23 PROCEDURE — ? COUNSELING

## 2023-10-23 PROCEDURE — ? MEDICATION COUNSELING

## 2023-10-23 PROCEDURE — 99214 OFFICE O/P EST MOD 30 MIN: CPT

## 2023-10-23 PROCEDURE — ? PRESCRIPTION

## 2023-10-23 PROCEDURE — ? PRESCRIPTION MEDICATION MANAGEMENT

## 2023-10-23 RX ORDER — TRIAMCINOLONE ACETONIDE 1 MG/G
1 OINTMENT TOPICAL BID
Qty: 454 | Refills: 1 | Status: ERX | COMMUNITY
Start: 2023-10-23

## 2023-10-23 RX ORDER — KETOCONAZOLE 20 MG/ML
SHAMPOO, SUSPENSION TOPICAL QDAY
Qty: 120 | Refills: 3 | Status: ERX

## 2023-10-23 RX ORDER — AMMONIUM LACTATE 120 MG/G
CREAM TOPICAL
Qty: 385 | Refills: 6 | Status: ERX | COMMUNITY
Start: 2023-10-23

## 2023-10-23 RX ORDER — CLOBETASOL PROPIONATE 0.5 MG/ML
SOLUTION TOPICAL PRN
Qty: 50 | Refills: 2 | Status: ERX

## 2023-10-23 RX ADMIN — AMMONIUM LACTATE: 120 CREAM TOPICAL at 00:00

## 2023-10-23 RX ADMIN — TRIAMCINOLONE ACETONIDE 1: 1 OINTMENT TOPICAL at 00:00

## 2023-10-23 ASSESSMENT — LOCATION SIMPLE DESCRIPTION DERM
LOCATION SIMPLE: RIGHT SCALP
LOCATION SIMPLE: RIGHT UPPER ARM
LOCATION SIMPLE: FRONTAL SCALP
LOCATION SIMPLE: LEFT SCALP
LOCATION SIMPLE: CHEST
LOCATION SIMPLE: LEFT UPPER ARM

## 2023-10-23 ASSESSMENT — LOCATION DETAILED DESCRIPTION DERM
LOCATION DETAILED: MEDIAL FRONTAL SCALP
LOCATION DETAILED: RIGHT ANTERIOR PROXIMAL UPPER ARM
LOCATION DETAILED: RIGHT MEDIAL SUPERIOR CHEST
LOCATION DETAILED: RIGHT CENTRAL FRONTAL SCALP
LOCATION DETAILED: LEFT CENTRAL FRONTAL SCALP
LOCATION DETAILED: LEFT ANTERIOR PROXIMAL UPPER ARM

## 2023-10-23 ASSESSMENT — LOCATION ZONE DERM
LOCATION ZONE: ARM
LOCATION ZONE: SCALP
LOCATION ZONE: TRUNK

## 2023-10-23 NOTE — PROCEDURE: PRESCRIPTION MEDICATION MANAGEMENT
Initiate Treatment: ammonium lactate 12% topical cream: apply moisturizer on dry areas 1-2 times per day\\n\\ntriamcinolone acetonide 0.1% topical ointment: Apply to itchy patches of skin BID x 2 weeks. Taper with improvement. Do not use on face, axillae, or groin.
Detail Level: Simple
Render In Strict Bullet Format?: No
Continue Regimen: Ketoconazole 2% shampoo: During each hair wash Massage a thin layer onto scalp, let sit for 5-10 minutes then rinse
Initiate Treatment: Clobetasol 0.05% scalp solution: Apply to dry scalp then rinse after one hour  x 2-3 weeks max/month PRN flare.

## 2023-10-23 NOTE — PROCEDURE: MEDICATION COUNSELING
<-- Click to add NO pertinent Past Medical History Solaraze Counseling:  I discussed with the patient the risks of Solaraze including but not limited to erythema, scaling, itching, weeping, crusting, and pain.

## 2023-10-23 NOTE — PROCEDURE: MEDICATION COUNSELING
High Dose Vitamin A Pregnancy And Lactation Text: High dose vitamin A therapy is contraindicated during pregnancy and breast feeding. Bed/Stretcher in lowest position, wheels locked, appropriate side rails in place/Call bell, personal items and telephone in reach/Instruct patient to call for assistance before getting out of bed/chair/stretcher/Non-slip footwear applied when patient is off stretcher/[place] to call system/Physically safe environment - no spills, clutter or unnecessary equipment/Purposeful proactive rounding/Room/bathroom lighting operational, light cord in reach

## 2023-10-23 NOTE — PROCEDURE: MEDICATION COUNSELING
Patient calls for follow up on this medication. She has been out for a week.     She will call pharmacy to ask for a few pills until this is filled.     RN, is she at any risk having not taken any of this medication for a week?     Patient number [phone]    Routing to covering MD as well as teams of PCP & covering Md   Fluconazole Counseling:  Patient counseled regarding adverse effects of fluconazole including but not limited to headache, diarrhea, nausea, upset stomach, liver function test abnormalities, taste disturbance, and stomach pain.  There is a rare possibility of liver failure that can occur when taking fluconazole.  The patient understands that monitoring of LFTs and kidney function test may be required, especially at baseline. The patient verbalized understanding of the proper use and possible adverse effects of fluconazole.  All of the patient's questions and concerns were addressed.

## 2023-10-23 NOTE — PROCEDURE: MEDICATION COUNSELING
RD MST for unintentional wt loss. Per EMR wt changes r/t fluid. Wt post paracentesis per EMR '22 120-129 lbs.   wt s/p paracentesis this adm 125 lbs; Is currently at UBW range.   Currently NPO for thoracentesis. Cardiac diet to resume.     RD available prn should needs arise. No nutrition intervention identified at this time    Sski Pregnancy And Lactation Text: This medication is Pregnancy Category D and isn't considered safe during pregnancy. It is excreted in breast milk.

## 2023-11-07 NOTE — PROCEDURE: MEDICATION COUNSELING
Stable Enbrel Counseling:  I discussed with the patient the risks of etanercept including but not limited to myelosuppression, immunosuppression, autoimmune hepatitis, demyelinating diseases, lymphoma, and infections.  The patient understands that monitoring is required including a PPD at baseline and must alert us or the primary physician if symptoms of infection or other concerning signs are noted.

## 2024-02-09 ENCOUNTER — TELEPHONE (OUTPATIENT)
Dept: CARDIOLOGY | Facility: CLINIC | Age: 69
End: 2024-02-09
Payer: MEDICARE

## 2024-02-09 NOTE — TELEPHONE ENCOUNTER
"Dr Bhat patient. Seen on 2/14/24. Scheduled fo4 2/14/24. PMH includes, HTN, ABNER, BPH, RA, HL , PAF, CAD.    Patient was directed to see you by PCP for SOB and constant fatigue.   Recovering from an URI and took a cough remedy from PCP. Coughing and expectorates copious mucous.     SOB with short ambulation ie. within the house walking room to room. Very SOB, weak and tired with walking [place] [employer].  BPS run 130s-140s/70-80 at weekly methotrexate infusions.  No swelling in legs, belly is large. States he lost 50 lbs last year and has gained it all back. Currently about 300 lbs.  Baseline sleeps with HOB adjusted to upright position and 3-4 pillows d/t ABNER. CPAP was recalled so he stopped using it, goes to see sleep specialist in April. When he wakes up he is not refreshed he feels groggy.    Taking Furosemide 1/2 AM and 1/2 PM. Never skips but might forget.  Still on Pradaxa ($500, needs to discuss options at OV) and Metoprolol.  Takes Diclofenac \"every blue moon\" for pain.  Compliant with low Na diet.  No OTC NSAIDs or decongestants.  Drinks up to 1 coffee or tea daily and then water.  Denies chest pain, palpitations or dizziness.     Instructed ER for SOB at rest or for CP, can't sleep d/t SOB, dizziness with palpitations.    Going to [facility] on [facility] on Monday if you want to order bloodwrk.    [phone]  "

## 2024-02-09 NOTE — TELEPHONE ENCOUNTER
Called and spoke with patient.  Reviewed note from triage RN Linette.  Told patient that I reviewed his last office visit with Dr. Bhat on 10/13/2023, and told patient that he got all the blood work done as requested by Dr. Bhat.  Told patient that he should go to the emergency room if he continues to have increased shortness of breath with chest pain, and or dizziness with palpitations, to be evaluated.  Could not ask anymore additional questions as patient stated that he was driving, and patient stated that he will see Dr. Bhat on Wed 02/14/2024.

## 2024-02-14 ENCOUNTER — OFFICE VISIT (OUTPATIENT)
Dept: CARDIOLOGY | Facility: CLINIC | Age: 69
End: 2024-02-14
Payer: MEDICARE

## 2024-02-14 ENCOUNTER — DOCUMENTATION (OUTPATIENT)
Dept: CARDIOLOGY | Facility: CLINIC | Age: 69
End: 2024-02-14

## 2024-02-14 VITALS
HEART RATE: 83 BPM | RESPIRATION RATE: 18 BRPM | SYSTOLIC BLOOD PRESSURE: 130 MMHG | HEIGHT: 72 IN | DIASTOLIC BLOOD PRESSURE: 80 MMHG | WEIGHT: 314.4 LBS | BODY MASS INDEX: 42.58 KG/M2

## 2024-02-14 DIAGNOSIS — I10 PRIMARY HYPERTENSION: ICD-10-CM

## 2024-02-14 DIAGNOSIS — R06.02 SOB (SHORTNESS OF BREATH): ICD-10-CM

## 2024-02-14 DIAGNOSIS — I48.0 PAF (PAROXYSMAL ATRIAL FIBRILLATION) (CMS/HCC): ICD-10-CM

## 2024-02-14 DIAGNOSIS — I25.10 CORONARY ARTERY DISEASE INVOLVING NATIVE CORONARY ARTERY OF NATIVE HEART WITHOUT ANGINA PECTORIS: ICD-10-CM

## 2024-02-14 DIAGNOSIS — R60.0 LOWER EXTREMITY EDEMA: ICD-10-CM

## 2024-02-14 DIAGNOSIS — E78.5 HYPERLIPIDEMIA, UNSPECIFIED HYPERLIPIDEMIA TYPE: ICD-10-CM

## 2024-02-14 PROCEDURE — G8752 SYS BP LESS 140: HCPCS | Performed by: INTERNAL MEDICINE

## 2024-02-14 PROCEDURE — G8754 DIAS BP LESS 90: HCPCS | Performed by: INTERNAL MEDICINE

## 2024-02-14 PROCEDURE — 99215 OFFICE O/P EST HI 40 MIN: CPT | Performed by: INTERNAL MEDICINE

## 2024-02-14 PROCEDURE — 93000 ELECTROCARDIOGRAM COMPLETE: CPT | Mod: XU | Performed by: INTERNAL MEDICINE

## 2024-02-14 NOTE — PROGRESS NOTES
Cardiology Outpatient Note    [facility] HEART [facility] Office  [address] [place] [address]     [facility]  [facility] [address]  [address] [place] [address]     TEL  [phone]  [facility]     Clint Pappas is a 69 y.o. male patient here for follow-up.  Patient sees Dr. Masters for rheumatology    Patient has a history of rheumatoid arthritis, hypertension, obstructive sleep apnea, BPH, who was admitted to [facility] 6-2022 with a urinary tract infection acute renal insufficiency.  However for him his main issue is lower extremity/ankle edema.  He did not have a DVT by ultrasound and had an echocardiogram at that time which showed normal left ventricular function.  He had paroxysmal atrial fibrillation at that time and was loaded with digoxin, reverted to sinus rhythm, digoxin discontinued, and started on Pradaxa.  Patient states the cost is increasing and discussed other options for anticoagulation.    Patient has been evaluated for shortness of breath and had cardiac catheterization which showed nonobstructive coronary artery disease and normal wedge pressure.  He had been started on furosemide for lower extremity edema swelling which was ultimately thought to be related to his rheumatologic issues.    He saw his primary care physician who decreased his dose of beta-blocker because the patient said he felt so fatigued when taking a full dose of 25 mg twice daily.  He also has had longstanding complaints of shortness of breath and fatigue which are currently not felt to be cardiac in origin based on his recent workup.  Patient was told by his primary care physician to attempt furosemide daily which has not changed his breathing.  Patient has an upcoming appointment with sleep medicine as he used to use CPAP but needs to replace the machine.  Patient has also gained weight in the last year he says related to eating and  drinking alcohol.  He has a family member who makes homemade wine.                                                             [facility]     Medical History:  Past Medical History:   Diagnosis Date   • Arthritis    • Atrial fibrillation (CMS/HCC)    • Edema    • Enlarged prostate    • Gastroesophageal reflux disease without esophagitis    • Hypertension    • Lipid disorder    • ABNER (obstructive sleep apnea)        Surgical History:  Past Surgical History:   Procedure Laterality Date   • CARPAL TUNNEL RELEASE Bilateral    • COLECTOMY     • EYE SURGERY     • NOSE SURGERY         Social History:  Social History     Tobacco Use   • Smoking status: Former     Years: 15     Types: Cigarettes     Quit date:      Years since quittin.1   • Smokeless tobacco: Never   Vaping Use   • Vaping Use: Never used   Substance Use Topics   • Alcohol use: Not Currently   • Drug use: Never       Family History: He indicated that his biological mother is . He indicated that his biological father is . He indicated that the status of his biological brother is unknown. He indicated that his maternal grandmother is . He indicated that his maternal grandfather is .      Allergies:Acetaminophen, Lisinopril, and Oxycodone    Current Medications:    Outpatient Encounter Medications as of 2024:   •  atorvastatin (LIPITOR) 40 mg tablet, Take 40 mg by mouth nightly.  •  fluticasone propionate (FLONASE) 50 mcg/actuation nasal spray, Administer 2 sprays into each nostril as needed.  •  folic acid (FOLVITE) 1 mg tablet, Take 1 mg by mouth daily.  •  furosemide (LASIX) 20 mg tablet, Take 1 tablet (20 mg total) by mouth 3 (three) times a week (Mon, Wed, Fri). (Patient taking differently: Take 20 mg by mouth every other day.)  •  hydrOXYchloroQUINE (PLAQUENIL) 200 mg tablet, Take 400 mg by mouth daily.  •  inFLIXimab (REMICADE) 100 mg injection, Infuse into a venous catheter.  •  METHOTREXATE SODIUM ORAL, Inject into  "the shoulder, thigh, or buttocks once a week.  •  metoprolol tartrate (LOPRESSOR) 25 mg tablet, Take 12.5 mg by mouth every 12 (twelve) hours.  •  oxybutynin XL (DITROPAN XL) 15 mg 24 hr tablet, TAKE ONE TABLET BY MOUTH IN THE MORNING AS DIRECTED  •  pantoprazole (PROTONIX) 40 mg EC tablet, Take 40 mg by mouth as needed.  •  rivaroxaban (XARELTO) 20 mg tablet, Take 1 tablet (20 mg total) by mouth daily with dinner.  •  tamsulosin (FLOMAX) 0.4 mg capsule, Take 0.4 mg by mouth daily.  •  testosterone cypionate (DEPO-TESTOTERONE) 200 mg/mL injection, every 28 (twentyeight) days.  •  [DISCONTINUED] PRADAXA 150 mg capsule, Take 1 capsule (150 mg total) by mouth 2 (two) times a day.                                                          OBJECTIVE   ROS as in HPI   Constitution: Negative for chills and fever.  Positive fatigue  Eyes: Negative for blurred vision and visual disturbance.   Cardiovascular: Negative for chest pain, dyspnea on exertion, near-syncope, palpitations and syncope.   Respiratory: Negative for hemoptysis, longstanding shortness of breath as noted above.  Hematologic/Lymphatic: No abnormal bleeding  Skin: Negative for rash. No new skin changes  Gastrointestinal: Negative for abdominal pain, diarrhea, hematochezia, melena, nausea and vomiting.   Genitourinary: Negative for dysuria and hematuria.   Neurological: Negative for headaches.   No lower extremity edema           Vitals:    02/14/24 1041   BP: 130/80   BP Location: Right upper arm   Patient Position: Sitting   Pulse: 83   Resp: 18   Weight: (!) 143 kg (314 lb 6.4 oz)   Height: 1.829 m (6' 0.01\")       BP Readings from Last 3 Encounters:   02/14/24 130/80   10/13/23 128/84   10/13/23 128/84     Wt Readings from Last 3 Encounters:   02/14/24 (!) 143 kg (314 lb 6.4 oz)   10/13/23 (!) 137 kg (302 lb)   10/13/23 (!) 137 kg (302 lb)       Physical Exam   Constitutional: Appears comfortable in no distress  HEENT:  Neck Supple.  No JVD upright no " "carotid bruits   Head: Normocephalic.   Eyes: Extraocular movements intact  Cardiovascular: Normal rate, regular rhythm 1 out of 6 systolic murmur left sternal border Exam reveals no friction rub.    Pulmonary/Chest: Effort normal and breath sounds normal. No wheezes.  Abdominal: Soft and nontender. Bowel sounds are normal.   Musculoskeletal: No significant lower extremity edema  Neurological: Alert and oriented to person, place, and time.   Skin: Skin is warm and dry.   Psychiatric: Behavior is normal.            Objective   Lab Results   Component Value Date    CHOL 140 10/13/2023    CHOL 134 03/29/2023     Lab Results   Component Value Date    HDL 58 10/13/2023    HDL 63 03/29/2023     Lab Results   Component Value Date    LDLCALC 60 10/13/2023    LDLCALC 56 03/29/2023     Lab Results   Component Value Date    TRIG 139 10/13/2023    TRIG 73 03/29/2023     Lab Results   Component Value Date    ALT 29 10/13/2023    AST 26 10/13/2023     Lab Results   Component Value Date    WBC 9.2 10/13/2023    HGB 12.8 (L) 10/13/2023    HCT 38.9 10/13/2023     10/13/2023     Lab Results   Component Value Date    GLUCOSE 102 (H) 03/29/2023     03/29/2023    K 4.5 03/29/2023    CO2 30 03/29/2023     03/29/2023    BUN 13 03/29/2023    CREATININE 0.81 03/29/2023     No results found for: \"HGBA1C\"  Lab Results   Component Value Date    TSH 0.50 08/31/2022     Lab Results   Component Value Date     (H) 08/31/2022     [unfilled]    Troponin I Results    No lab values to display.       No results found for: \"HSTROPONINI\"                                                       IMAGING   Transthoracic echocardiogram 10-   Left Ventricle: Normal ventricle size. Mild concentric left ventricular hypertrophy. Preserved systolic function. Estimated EF 55-60%. Unable to assess regional wall motion abnormalities. Grade II diastolic dysfunction.  •  Right Ventricle: Normal ventricle size. Normal systolic function.  •  " Left Atrium: Mildly dilated atrium.  •  Right Atrium: Normal sized atrium.  •  Aortic Valve: Tricuspid valve.  Sclerotic leaflets. Trace regurgitation. No stenosis.  •  Mitral Valve: Sclerotic mitral valve. Normal leaflet motion. Mild mitral annular calcification. Mild regurgitation. No stenosis.  •  Tricuspid Valve: Normal structure. Mild regurgitation. Estimated RVSP = 50 mmHg.  •  Aorta: Aortic root top normal. Ascending aorta normal-sized.  •  Pericardium: No evidence of pericardial effusion.         Cardiac catheterization 4-6-2023  Coronary Angiogram/Left Heart Catheterization   1.  60% focal stenosis of the mid LAD.  iFR negative, 0.97.  2.  Minimal luminal irregularities in the remaining coronary tree.     Right Heart Catheterization  1. Normal right and left heart filling pressures (RA 7, PA 34/16 (22), and PCW 12 mm Hg).  2. Cardiac output and index of 6.56 L/min and 2.7 L/min/m2 respectively.   3. SVR 1080 dynes  4. PVR 1.5 wood units    Trans thoracic echocardiogram [facility] 6-   Normal left ventricular size. Normal left ventricular wall thickness. Normal left   ventricular systolic function. LV Ejection Fraction was 55 %.     Findings:   Study Quality:   Technically Difficult. Intravenous contrast utilized to enhance endocardial visualization.   Left Ventricle:   Normal left ventricular size. Normal left ventricular wall thickness. Normal left   ventricular systolic function. LV Ejection Fraction was 55 %.   Diastolic Function:   Mitral valve inflow pattern and Tissue Doppler imaging within normal limits for age.   Right Ventricle:   Normal right ventricular systolic function. Normal right ventricular size. TAPSE 2.9 cm.   Left Atrium:   The left atrium is normal in size. Left atrial volume index 11 ml/m2.   Right Atrium:   The right atrium is normal in size. Right Atrial Volume Index 7 ml/m2.   Atrial Septum:   Not well visualized.   Mitral Valve:   Mild mitral annular calcification. Mitral  valve leaflets appear mildly thickened. Trivial   mitral regurgitation.   Aortic Valve:   Trileaflet aortic valve. No aortic regurgitation. No hemodynamically significant aortic   stenosis.   Tricuspid Valve:   Normal appearance of the tricuspid valve. There is trivial tricuspid regurgitation. RV   systolic pressure cannot be determined due to lack of a tricuspid regurgitation Doppler   signal.   Pulmonic Valve:   Normal appearance of the pulmonic valve.   Pericardium:   Normal pericardium with no significant pericardial effusion.   Aorta:   Normal-sized aortic root and ascending aorta.   IVC:   Normal size IVC with respiratory collapse consistent with a right atrial pressure of 3   mmHg. IVC diameter 1.6 cm.        Coronary CTA 4-  1.  Technically difficult study.  Significant artifact affects interpretation.  Calcified plaque seen in the left main as well as the proximal left anterior  descending and circumflex.  The vessels appear most likely patent.  However,  severity of any stenosis could not be definitively determined due to significant  artifact.  Clinical correlation suggested.  Distal vessels not well-visualized.    LEFT MAIN: Moderate amount of circumferential calcified plaque seen.  There  appears to be a mild (30-50%) associated stenosis.  However, due to artifact,  the exact severity of stenosis could not be determined..  The vessel appears  most likely patent.     LAD:  Proximal: Diffuse calcified plaque seen.  Severity of stenosis could not be  determined due to artifact..  The vessel appears most likely patent.  Mid: Diffuse calcified plaque seen.  Severity of stenosis cannot be determined  due to artifact..  The vessel appears most likely patent.  Distal: Not well seen..  DIAG 1: Not well seen..  DIAG 2: Not well seen..     LCX:  Proximal: Diffuse calcified plaque seen.  The severity of stenosis could not be  determined due to artifact.  The vessel appears most likely patent.  Mid: Calcified  plaque seen.  Severity of stenosis cannot be determined due to  artifact..  The vessel appears most likely patent.  Distal: Not well seen.  OM1:  Not well seen.  LPLB: Not well seen.  LPDA: Not well seen.     RCA:  Right coronary artery not well-visualized due to motion artifact..  No calcified  plaque seen.  Small nondominant vessel.     2.  Cardiac Structures: Normal.  3.  Normal left ventricular systolic function.  4.  Coronary calcium score 837.  This places the patient in the DIAZ 85th risk  percentile for age and gender matched individuals.  5.  Overall quality of the scan was poor.         EKG 2/14/2024   Sinus rhythm 83 bpm.  Incomplete right bundle branch block.  QTc 458 ms                                           ASSESSMENT AND PLAN   Mr. Pappas is a 69-year-old gentleman with the following issues:    Assessment/Plan    Coronary artery disease involving native coronary artery of native heart without angina pectoris  CTA 4-2021 shows no high-grade lesions but visualization limited.  Coronary angiogram 2023 shows 60% LAD lesion which is IFR negative.    As patient on anticoagulation for atrial fibrillation not started on aspirin at this time    Continue with metoprolol and atorvastatin.    PAF (paroxysmal atrial fibrillation) (CMS/[facility])  Paroxysmal atrial fibrillation 6-2022 at [facility] when he had a urinary tract infection.  Currently in sinus rhythm on Pradaxa 150 mg twice daily.  He says that he bruises easily but no significant melena or epistaxis.  Once completed with Pradaxa will change to Xarelto 20 mg daily because that is more affordable for him    Primary hypertension  Metoprolol tartrate 12.5 mg p.o. twice daily    Lower extremity edema  Secondary to rheumatologic issues and gout.  Currently no significant edema.  Continue with furosemide 3 times weekly.    Hyperlipidemia  Atorvastatin 40 mg p.o. daily.  Blood work 2023 shows LDL 60 triglycerides 139 HDL 58 normal liver function  test C-reactive protein 0.9    SOB (shortness of breath)  Multifactorial related to sleep apnea sinus issues and weight.  Coronary catheterization showed wedge pressure 12 mmHg.  Have asked patient to consider using CPAP and following up with pulmonary.  Has appointment with sleep medicine next week.  Will also complete a monitor to exclude any significant conduction disease as he noted profound fatigue with metoprolol-is tolerating 12.5 mg twice daily but did not tolerate 25 mg daily per his primary care physician.  No complaints of syncope    Fatigue  No significant anemia on most recent blood work.  Will complete monitor to exclude significant conduction disease but has had no signs of it on EKGs here.  Currently no cardiac etiology for his fatigue          Return in about 3 months (around 5/14/2024).           Thank you for allowing me to participate in the care of this patient.  I hope this information is helpful.         Ajay Bhat MD [facility] Center[facility]ETZA[facility]  2/14/2024  11:36 AM    This document was generated utilizing voice recognition technology. A reasonable attempt at proofreading has been made to minimize errors but please excuse any typographical errors which may be present. Please call with any questions.

## 2024-02-14 NOTE — LETTER
February 14, 2024     Kush Wen MD  [address] [facility] [address]    Patient: Clint Pappas  YOB: 1955  Date of Visit: 2/14/2024      Dear Dr. Wen:    Thank you for referring Clint Pappas to me for evaluation. Below are my notes for this consultation.    If you have questions, please do not hesitate to call me. I look forward to following your patient along with you.         Sincerely,        Ajay Bhat MD        CC: No Recipients    Ajay Bhat MD  2/14/2024 11:37 AM  Signed       Cardiology Outpatient Note    [facility] Office  [address] [place] [address]     [facility]  [facility] Level  [address] [place] [address]     TEL  [phone]  [facility]     Clint Pappas is a 69 y.o. male patient here for follow-up.  Patient sees Dr. Masters for rheumatology    Patient has a history of rheumatoid arthritis, hypertension, obstructive sleep apnea, BPH, who was admitted to [facility] 6-2022 with a urinary tract infection acute renal insufficiency.  However for him his main issue is lower extremity/ankle edema.  He did not have a DVT by ultrasound and had an echocardiogram at that time which showed normal left ventricular function.  He had paroxysmal atrial fibrillation at that time and was loaded with digoxin, reverted to sinus rhythm, digoxin discontinued, and started on Pradaxa.  Patient states the cost is increasing and discussed other options for anticoagulation.    Patient has been evaluated for shortness of breath and had cardiac catheterization which showed nonobstructive coronary artery disease and normal wedge pressure.  He had been started on furosemide for lower extremity edema swelling which was ultimately thought to be related to his rheumatologic issues.    He saw his primary care physician who decreased his dose of beta-blocker because the patient  said he felt so fatigued when taking a full dose of 25 mg twice daily.  He also has had longstanding complaints of shortness of breath and fatigue which are currently not felt to be cardiac in origin based on his recent workup.  Patient was told by his primary care physician to attempt furosemide daily which has not changed his breathing.  Patient has an upcoming appointment with sleep medicine as he used to use CPAP but needs to replace the machine.  Patient has also gained weight in the last year he says related to eating and drinking alcohol.  He has a family member who makes homemade wine.                                                             [facility]     Medical History:  Past Medical History:   Diagnosis Date   • Arthritis    • Atrial fibrillation (CMS/HCC)    • Edema    • Enlarged prostate    • Gastroesophageal reflux disease without esophagitis    • Hypertension    • Lipid disorder    • ABNER (obstructive sleep apnea)        Surgical History:  Past Surgical History:   Procedure Laterality Date   • CARPAL TUNNEL RELEASE Bilateral    • COLECTOMY     • EYE SURGERY     • NOSE SURGERY         Social History:  Social History     Tobacco Use   • Smoking status: Former     Years: 15     Types: Cigarettes     Quit date:      Years since quittin.1   • Smokeless tobacco: Never   Vaping Use   • Vaping Use: Never used   Substance Use Topics   • Alcohol use: Not Currently   • Drug use: Never       Family History: He indicated that his biological mother is . He indicated that his biological father is . He indicated that the status of his biological brother is unknown. He indicated that his maternal grandmother is . He indicated that his maternal grandfather is .      Allergies:Acetaminophen, Lisinopril, and Oxycodone    Current Medications:    Outpatient Encounter Medications as of 2024:   •  atorvastatin (LIPITOR) 40 mg tablet, Take 40 mg by mouth nightly.  •  fluticasone  propionate (FLONASE) 50 mcg/actuation nasal spray, Administer 2 sprays into each nostril as needed.  •  folic acid (FOLVITE) 1 mg tablet, Take 1 mg by mouth daily.  •  furosemide (LASIX) 20 mg tablet, Take 1 tablet (20 mg total) by mouth 3 (three) times a week (Mon, Wed, Fri). (Patient taking differently: Take 20 mg by mouth every other day.)  •  hydrOXYchloroQUINE (PLAQUENIL) 200 mg tablet, Take 400 mg by mouth daily.  •  inFLIXimab (REMICADE) 100 mg injection, Infuse into a venous catheter.  •  METHOTREXATE SODIUM ORAL, Inject into the shoulder, thigh, or buttocks once a week.  •  metoprolol tartrate (LOPRESSOR) 25 mg tablet, Take 12.5 mg by mouth every 12 (twelve) hours.  •  oxybutynin XL (DITROPAN XL) 15 mg 24 hr tablet, TAKE ONE TABLET BY MOUTH IN THE MORNING AS DIRECTED  •  pantoprazole (PROTONIX) 40 mg EC tablet, Take 40 mg by mouth as needed.  •  rivaroxaban (XARELTO) 20 mg tablet, Take 1 tablet (20 mg total) by mouth daily with dinner.  •  tamsulosin (FLOMAX) 0.4 mg capsule, Take 0.4 mg by mouth daily.  •  testosterone cypionate (DEPO-TESTOTERONE) 200 mg/mL injection, every 28 (twentyeight) days.  •  [DISCONTINUED] PRADAXA 150 mg capsule, Take 1 capsule (150 mg total) by mouth 2 (two) times a day.                                                          OBJECTIVE   ROS as in HPI   Constitution: Negative for chills and fever.  Positive fatigue  Eyes: Negative for blurred vision and visual disturbance.   Cardiovascular: Negative for chest pain, dyspnea on exertion, near-syncope, palpitations and syncope.   Respiratory: Negative for hemoptysis, longstanding shortness of breath as noted above.  Hematologic/Lymphatic: No abnormal bleeding  Skin: Negative for rash. No new skin changes  Gastrointestinal: Negative for abdominal pain, diarrhea, hematochezia, melena, nausea and vomiting.   Genitourinary: Negative for dysuria and hematuria.   Neurological: Negative for headaches.   No lower extremity edema        "    Vitals:    02/14/24 1041   BP: 130/80   BP Location: Right upper arm   Patient Position: Sitting   Pulse: 83   Resp: 18   Weight: (!) 143 kg (314 lb 6.4 oz)   Height: 1.829 m (6' 0.01\")       BP Readings from Last 3 Encounters:   02/14/24 130/80   10/13/23 128/84   10/13/23 128/84     Wt Readings from Last 3 Encounters:   02/14/24 (!) 143 kg (314 lb 6.4 oz)   10/13/23 (!) 137 kg (302 lb)   10/13/23 (!) 137 kg (302 lb)       Physical Exam   Constitutional: Appears comfortable in no distress  HEENT:  Neck Supple.  No JVD upright no carotid bruits   Head: Normocephalic.   Eyes: Extraocular movements intact  Cardiovascular: Normal rate, regular rhythm 1 out of 6 systolic murmur left sternal border Exam reveals no friction rub.    Pulmonary/Chest: Effort normal and breath sounds normal. No wheezes.  Abdominal: Soft and nontender. Bowel sounds are normal.   Musculoskeletal: No significant lower extremity edema  Neurological: Alert and oriented to person, place, and time.   Skin: Skin is warm and dry.   Psychiatric: Behavior is normal.            Objective   Lab Results   Component Value Date    CHOL 140 10/13/2023    CHOL 134 03/29/2023     Lab Results   Component Value Date    HDL 58 10/13/2023    HDL 63 03/29/2023     Lab Results   Component Value Date    LDLCALC 60 10/13/2023    LDLCALC 56 03/29/2023     Lab Results   Component Value Date    TRIG 139 10/13/2023    TRIG 73 03/29/2023     Lab Results   Component Value Date    ALT 29 10/13/2023    AST 26 10/13/2023     Lab Results   Component Value Date    WBC 9.2 10/13/2023    HGB 12.8 (L) 10/13/2023    HCT 38.9 10/13/2023     10/13/2023     Lab Results   Component Value Date    GLUCOSE 102 (H) 03/29/2023     03/29/2023    K 4.5 03/29/2023    CO2 30 03/29/2023     03/29/2023    BUN 13 03/29/2023    CREATININE 0.81 03/29/2023     No results found for: \"HGBA1C\"  Lab Results   Component Value Date    TSH 0.50 08/31/2022     Lab Results   Component " "Value Date     (H) 08/31/2022     [unfilled]    Troponin I Results    No lab values to display.       No results found for: \"HSTROPONINI\"                                                       IMAGING   Transthoracic echocardiogram 10-   Left Ventricle: Normal ventricle size. Mild concentric left ventricular hypertrophy. Preserved systolic function. Estimated EF 55-60%. Unable to assess regional wall motion abnormalities. Grade II diastolic dysfunction.  •  Right Ventricle: Normal ventricle size. Normal systolic function.  •  Left Atrium: Mildly dilated atrium.  •  Right Atrium: Normal sized atrium.  •  Aortic Valve: Tricuspid valve.  Sclerotic leaflets. Trace regurgitation. No stenosis.  •  Mitral Valve: Sclerotic mitral valve. Normal leaflet motion. Mild mitral annular calcification. Mild regurgitation. No stenosis.  •  Tricuspid Valve: Normal structure. Mild regurgitation. Estimated RVSP = 50 mmHg.  •  Aorta: Aortic root top normal. Ascending aorta normal-sized.  •  Pericardium: No evidence of pericardial effusion.         Cardiac catheterization 4-6-2023  Coronary Angiogram/Left Heart Catheterization   1.  60% focal stenosis of the mid LAD.  iFR negative, 0.97.  2.  Minimal luminal irregularities in the remaining coronary tree.     Right Heart Catheterization  1. Normal right and left heart filling pressures (RA 7, PA 34/16 (22), and PCW 12 mm Hg).  2. Cardiac output and index of 6.56 L/min and 2.7 L/min/m2 respectively.   3. SVR 1080 dynes  4. PVR 1.5 wood units    Trans thoracic echocardiogram [facility] 6-   Normal left ventricular size. Normal left ventricular wall thickness. Normal left   ventricular systolic function. LV Ejection Fraction was 55 %.     Findings:   Study Quality:   Technically Difficult. Intravenous contrast utilized to enhance endocardial visualization.   Left Ventricle:   Normal left ventricular size. Normal left ventricular wall thickness. Normal left   ventricular " systolic function. LV Ejection Fraction was 55 %.   Diastolic Function:   Mitral valve inflow pattern and Tissue Doppler imaging within normal limits for age.   Right Ventricle:   Normal right ventricular systolic function. Normal right ventricular size. TAPSE 2.9 cm.   Left Atrium:   The left atrium is normal in size. Left atrial volume index 11 ml/m2.   Right Atrium:   The right atrium is normal in size. Right Atrial Volume Index 7 ml/m2.   Atrial Septum:   Not well visualized.   Mitral Valve:   Mild mitral annular calcification. Mitral valve leaflets appear mildly thickened. Trivial   mitral regurgitation.   Aortic Valve:   Trileaflet aortic valve. No aortic regurgitation. No hemodynamically significant aortic   stenosis.   Tricuspid Valve:   Normal appearance of the tricuspid valve. There is trivial tricuspid regurgitation. RV   systolic pressure cannot be determined due to lack of a tricuspid regurgitation Doppler   signal.   Pulmonic Valve:   Normal appearance of the pulmonic valve.   Pericardium:   Normal pericardium with no significant pericardial effusion.   Aorta:   Normal-sized aortic root and ascending aorta.   IVC:   Normal size IVC with respiratory collapse consistent with a right atrial pressure of 3   mmHg. IVC diameter 1.6 cm.        Coronary CTA 4-  1.  Technically difficult study.  Significant artifact affects interpretation.  Calcified plaque seen in the left main as well as the proximal left anterior  descending and circumflex.  The vessels appear most likely patent.  However,  severity of any stenosis could not be definitively determined due to significant  artifact.  Clinical correlation suggested.  Distal vessels not well-visualized.    LEFT MAIN: Moderate amount of circumferential calcified plaque seen.  There  appears to be a mild (30-50%) associated stenosis.  However, due to artifact,  the exact severity of stenosis could not be determined..  The vessel appears  most likely  patent.     LAD:  Proximal: Diffuse calcified plaque seen.  Severity of stenosis could not be  determined due to artifact..  The vessel appears most likely patent.  Mid: Diffuse calcified plaque seen.  Severity of stenosis cannot be determined  due to artifact..  The vessel appears most likely patent.  Distal: Not well seen..  DIAG 1: Not well seen..  DIAG 2: Not well seen..     LCX:  Proximal: Diffuse calcified plaque seen.  The severity of stenosis could not be  determined due to artifact.  The vessel appears most likely patent.  Mid: Calcified plaque seen.  Severity of stenosis cannot be determined due to  artifact..  The vessel appears most likely patent.  Distal: Not well seen.  OM1:  Not well seen.  LPLB: Not well seen.  LPDA: Not well seen.     RCA:  Right coronary artery not well-visualized due to motion artifact..  No calcified  plaque seen.  Small nondominant vessel.     2.  Cardiac Structures: Normal.  3.  Normal left ventricular systolic function.  4.  Coronary calcium score 837.  This places the patient in the DIAZ 85th risk  percentile for age and gender matched individuals.  5.  Overall quality of the scan was poor.         EKG 2/14/2024   Sinus rhythm 83 bpm.  Incomplete right bundle branch block.  QTc 458 ms                                           ASSESSMENT AND PLAN   Mr. Pappas is a 69-year-old gentleman with the following issues:    Assessment/Plan    Coronary artery disease involving native coronary artery of native heart without angina pectoris  CTA 4-2021 shows no high-grade lesions but visualization limited.  Coronary angiogram 2023 shows 60% LAD lesion which is IFR negative.    As patient on anticoagulation for atrial fibrillation not started on aspirin at this time    Continue with metoprolol and atorvastatin.    PAF (paroxysmal atrial fibrillation) (CMS/[facility])  Paroxysmal atrial fibrillation 6-2022 at [facility] when he had a urinary tract infection.  Currently in sinus rhythm  on Pradaxa 150 mg twice daily.  He says that he bruises easily but no significant melena or epistaxis.  Once completed with Pradaxa will change to Xarelto 20 mg daily because that is more affordable for him    Primary hypertension  Metoprolol tartrate 12.5 mg p.o. twice daily    Lower extremity edema  Secondary to rheumatologic issues and gout.  Currently no significant edema.  Continue with furosemide 3 times weekly.    Hyperlipidemia  Atorvastatin 40 mg p.o. daily.  Blood work 2023 shows LDL 60 triglycerides 139 HDL 58 normal liver function test C-reactive protein 0.9    SOB (shortness of breath)  Multifactorial related to sleep apnea sinus issues and weight.  Coronary catheterization showed wedge pressure 12 mmHg.  Have asked patient to consider using CPAP and following up with pulmonary.  Has appointment with sleep medicine next week.  Will also complete a monitor to exclude any significant conduction disease as he noted profound fatigue with metoprolol-is tolerating 12.5 mg twice daily but did not tolerate 25 mg daily per his primary care physician.  No complaints of syncope    Fatigue  No significant anemia on most recent blood work.  Will complete monitor to exclude significant conduction disease but has had no signs of it on EKGs here.  Currently no cardiac etiology for his fatigue         Return in about 3 months (around 5/14/2024).           Thank you for allowing me to participate in the care of this patient.  I hope this information is helpful.         Ajay Bhat MD [facility]  2/14/2024  11:36 AM    This document was generated utilizing voice recognition technology. A reasonable attempt at proofreading has been made to minimize errors but please excuse any typographical errors which may be present. Please call with any questions.

## 2024-02-22 LAB
ATRIAL RATE: 83
P AXIS: 61
PR INTERVAL: 186
QRS DURATION: 92
QT INTERVAL: 390
QTC CALCULATION(BAZETT): 458
R AXIS: 43
T WAVE AXIS: 47
VENTRICULAR RATE: 83

## 2024-03-01 ENCOUNTER — DOCUMENTATION (OUTPATIENT)
Dept: CARDIOLOGY | Facility: CLINIC | Age: 69
End: 2024-03-01
Payer: MEDICARE

## 2024-03-01 ENCOUNTER — TELEPHONE (OUTPATIENT)
Dept: CARDIOLOGY | Facility: CLINIC | Age: 69
End: 2024-03-01
Payer: MEDICARE

## 2024-03-01 DIAGNOSIS — I10 PRIMARY HYPERTENSION: ICD-10-CM

## 2024-03-01 DIAGNOSIS — I48.0 PAF (PAROXYSMAL ATRIAL FIBRILLATION) (CMS/HCC): ICD-10-CM

## 2024-03-01 DIAGNOSIS — E78.5 HYPERLIPIDEMIA, UNSPECIFIED HYPERLIPIDEMIA TYPE: ICD-10-CM

## 2024-03-01 DIAGNOSIS — I25.10 CORONARY ARTERY DISEASE INVOLVING NATIVE CORONARY ARTERY OF NATIVE HEART WITHOUT ANGINA PECTORIS: ICD-10-CM

## 2024-03-01 DIAGNOSIS — R06.02 SOB (SHORTNESS OF BREATH): ICD-10-CM

## 2024-03-01 DIAGNOSIS — R60.0 LOWER EXTREMITY EDEMA: ICD-10-CM

## 2024-03-01 RX ORDER — METOPROLOL TARTRATE 25 MG/1
25 TABLET, FILM COATED ORAL 2 TIMES DAILY
Qty: 180 TABLET | Refills: 3 | Status: SHIPPED | OUTPATIENT
Start: 2024-03-01 | End: 2025-03-01

## 2024-03-01 NOTE — PROGRESS NOTES
Monitor 3 days 2-2024  -Heart rate 53 to 129 bpm with average heart rate 72 bpm  -452 episodes of SVT with average heart rate 124 bpm  -No ventricular tachycardia no atrial fibrillation no high-grade AV block no pauses  -No patient event triggers

## 2024-03-01 NOTE — TELEPHONE ENCOUNTER
Please call patient on Monday and let him know that his monitor results were relatively good but he has brief episodes of  fast heartbeat.  His metoprolol tartrate has been increased to 25 mg twice daily and prescription sent [facility].

## 2024-03-04 NOTE — TELEPHONE ENCOUNTER
Just a MUKESH.  Patient stated that in the past when he was on metoprolol tartrate 25 mg twice a day he felt very fatigue and that he could not do anything.  I asked patient to try to take the metoprolol tartrate 25 mg twice a day and let us know how he feels, which patient is agreeable to.  Told patient that I would make you aware.

## 2024-03-04 NOTE — TELEPHONE ENCOUNTER
Called and spoke with Clint, and made him aware that Dr. Bhat reviewed his Zio Patch results.  Made patient aware that his monitor results were relatively good but he has brief episodes of  fast heartbeat.  Told patient that Dr. Bhat increased his metoprolol tartrate from 12.5 mg twice a day to metoprolol tartrate 25 mg twice a day and that a script was sent to his [facility].  Patient stated that in the past when he was on metoprolol tartrate 25 mg twice a day he felt very fatigue and that he could not do anything.  I asked patient to try to take the metoprolol tartrate 25 mg twice a day and let us know how he feels, which patient is agreeable to.  Told patient that I will let Dr. Bhat know about this.  Patient verbally understands all above information and has no further questions at this time.

## 2024-03-21 NOTE — TELEPHONE ENCOUNTER
Medicine Refill Request  Pt calling in   •  rivaroxaban (XARELTO) 20 mg tablet,      Current Outpatient Medications:   •  atorvastatin (LIPITOR) 40 mg tablet, Take 40 mg by mouth nightly., Disp: , Rfl:   •  fluticasone propionate (FLONASE) 50 mcg/actuation nasal spray, Administer 2 sprays into each nostril as needed., Disp: , Rfl:   •  folic acid (FOLVITE) 1 mg tablet, Take 1 mg by mouth daily., Disp: , Rfl:   •  furosemide (LASIX) 20 mg tablet, Take 1 tablet (20 mg total) by mouth 3 (three) times a week (Mon, Wed, Fri). (Patient taking differently: Take 20 mg by mouth every other day.), Disp: 36 tablet, Rfl: 1  •  hydrOXYchloroQUINE (PLAQUENIL) 200 mg tablet, Take 400 mg by mouth daily., Disp: , Rfl:   •  inFLIXimab (REMICADE) 100 mg injection, Infuse into a venous catheter., Disp: , Rfl:   •  METHOTREXATE SODIUM ORAL, Inject into the shoulder, thigh, or buttocks once a week., Disp: , Rfl:   •  metoprolol tartrate (LOPRESSOR) 25 mg tablet, Take 1 tablet (25 mg total) by mouth 2 (two) times a day., Disp: 180 tablet, Rfl: 3  •  oxybutynin XL (DITROPAN XL) 15 mg 24 hr tablet, TAKE ONE TABLET BY MOUTH IN THE MORNING AS DIRECTED, Disp: , Rfl:   •  pantoprazole (PROTONIX) 40 mg EC tablet, Take 40 mg by mouth as needed., Disp: , Rfl:   •  rivaroxaban (XARELTO) 20 mg tablet, Take 1 tablet (20 mg total) by mouth daily with dinner., Disp: 90 tablet, Rfl: 1  •  tamsulosin (FLOMAX) 0.4 mg capsule, Take 0.4 mg by mouth daily., Disp: , Rfl:   •  testosterone cypionate (DEPO-TESTOTERONE) 200 mg/mL injection, every 28 (twentyeight) days., Disp: , Rfl:       BP Readings from Last 3 Encounters:   02/14/24 130/80   10/13/23 128/84   10/13/23 128/84       Recent Lab results:  Lab Results   Component Value Date    CHOL 140 10/13/2023   ,   Lab Results   Component Value Date    HDL 58 10/13/2023   ,   Lab Results   Component Value Date    LDLCALC 60 10/13/2023   ,   Lab Results   Component Value Date    TRIG 139 10/13/2023        Lab  "Results   Component Value Date    GLUCOSE 102 (H) 03/29/2023   , No results found for: \"HGBA1C\"      Lab Results   Component Value Date    CREATININE 0.81 03/29/2023       Lab Results   Component Value Date    TSH 0.50 08/31/2022         No results found for: \"HGBA1C\"  "

## 2024-03-25 ENCOUNTER — TELEPHONE (OUTPATIENT)
Dept: SCHEDULING | Facility: CLINIC | Age: 69
End: 2024-03-25
Payer: MEDICARE

## 2024-03-25 NOTE — TELEPHONE ENCOUNTER
Spoke with patient via telephone letting him know that he can reach out to [employer] to see if he qualifies for their assistance program. [employer] information provided to patient via portal message. Pt will update us later this week. He s being told at pharmacy that a 3 month supply is $600. He states he has enough Xarelto to last this week. 4 bottles of Xarelto placed at  for patient until this is resolved.     Xarelto 20 mg x4 bottles Lot # [id number] Exp 08/2025

## 2024-03-25 NOTE — TELEPHONE ENCOUNTER
Pt called states he went to pharmacy to  rivaroxaban (XARELTO) 20 mg tablet medication was to expensive.Wants to know if Dr Bhat can order different medication please advise    Pt can be reached at:[phone]

## 2024-03-26 NOTE — TELEPHONE ENCOUNTER
Called and spoke with patient and made him aware that he should be taking Xarelto 20 mg one tablet daily.  Patient verbally understands and has no further questions at this time.

## 2024-03-26 NOTE — TELEPHONE ENCOUNTER
Pt is asking for clarification on how often he should be taking Xarelto?    Pt can be reached at [phone].     Ty.

## 2024-04-02 NOTE — TELEPHONE ENCOUNTER
Patient Name: Clint Pappas    Caller name: Clint Pappas      Relationship:   Self    Reason for call:   Pt wants to to know if he can go back on either PRADAXA 150 mg capsule or apixaban (ELIQUIS) 5 mg table for financial reasons please advise    Callback number: [phone]  Messages ok as Pt has appt today at 3 pm

## 2024-04-03 NOTE — TELEPHONE ENCOUNTER
Called and spoke with patient.  Asked patient if he had looked into the [employer] that nurse Belle provided patient on 03/25/2024, which patient stated that he has not looked into this yet due to him thinking that his income was to high.  Patient stated that he will look into this matter.  Also told patient that he should reach out to his insurance company to see which medication is cost effective to him (PRADAXA , Eliquis or Xarelto).  Patient stated that he will also look into this.  Told patient to give us a call back once he is able to look into this matter and we can order what is cost effective to him.

## 2024-04-22 ENCOUNTER — TELEPHONE (OUTPATIENT)
Dept: SCHEDULING | Facility: CLINIC | Age: 69
End: 2024-04-22
Payer: MEDICARE

## 2024-04-22 DIAGNOSIS — I48.0 PAF (PAROXYSMAL ATRIAL FIBRILLATION) (CMS/HCC): Primary | ICD-10-CM

## 2024-04-22 NOTE — TELEPHONE ENCOUNTER
Patient wants to stop taking Xarelto due to Hematuria, and would like to know if you have a prefernce with patient taking Eliquis 5 mg BID or Pradaxa 150 mg BID?  Please advise.  Thank you.

## 2024-04-22 NOTE — TELEPHONE ENCOUNTER
Pt of Dr. Bhat w/hx rheumatoid arthritis, HTN, ABNER, BPH, UTI and parox afib, currently on Xarelto AC. Call from pt today to discuss hematuria on Xarelto.    Pt reports that he was transitioned to Xarelto from Pradaxa d/t OOP cost when seen in office in February. Started Xarelto in the beginning of March after he ran out of the Pradaxa. Admits that he began to notice some intermittent blood tinged urine, but then went to ED on 4/13 when symptoms of hematuria worsened.     Went to Urologist last week (Dr. Odell - 4/18) and started on an antibiotic on 4/21 for UTI. States that at this visit he had no blood in the urine. Continued on Xarelto (last dose Saturday evening) and then yesterday had another episode of gross hematuria. HELD Xarelto last evening and no blood in urine today.     Pt has concerns about being off anticoagulation, but he is also not sure what to do about the hematuria on the Xarelto. Wondering if he should resume Pradaxa or Eliquis? Patient reports no symptoms of hematuria on either of these medications.     Mr. Pappas asking for callback at [phone]. Thank you.

## 2024-04-22 NOTE — TELEPHONE ENCOUNTER
Called and spoke with patient.  Patient stated that he would like to stop taking Xarelto due to he feels that it is causing bleeding in his urine.  Patient stated that he did not have any problems with bleeding on Eliquis or Pradaxa.  Patient stated that he would like to go back on either Pradaxa or Eliquis which ever one was cheaper and if Dr. Bhat had a preference.  I did remind patient that looking back in previous phone call messages between myself and Belle we asked patient to reach out to their insurance company to see which medication was cheaper.  Told patient that I could reach out to Dr. Bhat and see if she has a preference between Eliquis 5 mg BID or Pradaxa 150 mg BID.  Told patient that I would give him a call back.  Patient verbally understands and reminded patient again to call insurance company to check pricing.  Patient has no further questions at this time.

## 2024-04-23 NOTE — TELEPHONE ENCOUNTER
Called patient back.  Spoke with patient and made him aware that Dr. Bhat would like for him to take Eliquis 5 mg BID.  Patient asked if a script could be sent to [facility].  To patient will escribe to [facility].  Patient verbally understands and has no further questions at this time.

## 2024-05-15 ENCOUNTER — OFFICE VISIT (OUTPATIENT)
Dept: CARDIOLOGY | Facility: CLINIC | Age: 69
End: 2024-05-15
Payer: MEDICARE

## 2024-05-15 VITALS
SYSTOLIC BLOOD PRESSURE: 110 MMHG | RESPIRATION RATE: 18 BRPM | DIASTOLIC BLOOD PRESSURE: 72 MMHG | BODY MASS INDEX: 42.39 KG/M2 | WEIGHT: 313 LBS | HEIGHT: 72 IN | HEART RATE: 81 BPM

## 2024-05-15 DIAGNOSIS — R06.02 SOB (SHORTNESS OF BREATH): Primary | ICD-10-CM

## 2024-05-15 DIAGNOSIS — I48.0 PAF (PAROXYSMAL ATRIAL FIBRILLATION) (CMS/HCC): ICD-10-CM

## 2024-05-15 DIAGNOSIS — I25.10 CORONARY ARTERY DISEASE INVOLVING NATIVE CORONARY ARTERY OF NATIVE HEART WITHOUT ANGINA PECTORIS: ICD-10-CM

## 2024-05-15 DIAGNOSIS — G47.33 OSA (OBSTRUCTIVE SLEEP APNEA): ICD-10-CM

## 2024-05-15 DIAGNOSIS — I10 PRIMARY HYPERTENSION: ICD-10-CM

## 2024-05-15 DIAGNOSIS — R60.0 LOWER EXTREMITY EDEMA: ICD-10-CM

## 2024-05-15 LAB
ATRIAL RATE: 81
P AXIS: 53
PR INTERVAL: 186
QRS DURATION: 92
QT INTERVAL: 418
QTC CALCULATION(BAZETT): 485
R AXIS: 44
T WAVE AXIS: 30
VENTRICULAR RATE: 81

## 2024-05-15 PROCEDURE — G8754 DIAS BP LESS 90: HCPCS | Performed by: INTERNAL MEDICINE

## 2024-05-15 PROCEDURE — 93000 ELECTROCARDIOGRAM COMPLETE: CPT | Performed by: INTERNAL MEDICINE

## 2024-05-15 PROCEDURE — G8752 SYS BP LESS 140: HCPCS | Performed by: INTERNAL MEDICINE

## 2024-05-15 PROCEDURE — 99215 OFFICE O/P EST HI 40 MIN: CPT | Performed by: INTERNAL MEDICINE

## 2024-05-15 RX ORDER — SILDENAFIL 100 MG/1
100 TABLET, FILM COATED ORAL AS NEEDED
COMMUNITY
Start: 2024-04-04

## 2024-05-15 RX ORDER — IPRATROPIUM BROMIDE 42 UG/1
2 SPRAY, METERED NASAL AS NEEDED
COMMUNITY
Start: 2024-02-29

## 2024-05-15 RX ORDER — SOLIFENACIN SUCCINATE 10 MG/1
10 TABLET, FILM COATED ORAL EVERY OTHER DAY
COMMUNITY

## 2024-05-15 RX ORDER — DABIGATRAN ETEXILATE 150 MG/1
150 CAPSULE ORAL 2 TIMES DAILY
COMMUNITY
Start: 2024-04-23 | End: 2024-05-15

## 2024-05-15 NOTE — ASSESSMENT & PLAN NOTE
CTA 4-2021 shows no high-grade lesions but visualization limited.  Coronary angiogram 2023 shows 60% LAD lesion which is IFR negative.    As patient on anticoagulation for atrial fibrillation not started on aspirin at this time    Continue with metoprolol and atorvastatin.

## 2024-05-15 NOTE — ASSESSMENT & PLAN NOTE
Multifactorial related to sleep apnea sinus issues and weight.  Coronary catheterization showed wedge pressure 12 mmHg.  Have asked patient to use CPAP and follow-up with pulmonary.    No complaints of syncope

## 2024-05-15 NOTE — ASSESSMENT & PLAN NOTE
Paroxysmal atrial fibrillation 6-2022 at [facility] when he had a urinary tract infection.  Had been on Pradaxa 150 mg twice daily, then changed to Xarelto 20 mg daily.  Took extra doses of Xarelto by mistake resulting in hematuria.  Now wishes to go back to apixaban 5 mg twice daily.  If there is a cost issue he will call the office

## 2024-05-15 NOTE — PROGRESS NOTES
Cardiology Outpatient Note    [facility] HEART [facility] Office  [address] [place] [address]     [facility]  The [facility] [address]  [address] [place] [address]     TEL  [phone]  [email or web address]     Clint Pappas is a 69 y.o. male patient here for follow-up.  Patient sees Dr. Masters for rheumatology    Patient has a history of rheumatoid arthritis, hypertension, obstructive sleep apnea, BPH, who was admitted to [facility] 6-2022 with a urinary tract infection acute renal insufficiency.  However for him his main issue is lower extremity/ankle edema.  He did not have a DVT by ultrasound and had an echocardiogram at that time which showed normal left ventricular function.  He had paroxysmal atrial fibrillation at that time and was loaded with digoxin, reverted to sinus rhythm, digoxin discontinued, and started on Pradaxa.  Patient states the cost is increasing and discussed other options for anticoagulation.    Patient has been evaluated for shortness of breath and had cardiac catheterization which showed nonobstructive coronary artery disease and normal wedge pressure.  He had been started on furosemide for lower extremity edema swelling which was ultimately thought to be related to his rheumatologic issues.    Patient has severe sleep apnea but needs a new CPAP machine and plans on getting in touch with his pulmonologist.  He still has issues with arthritis which cause him fatigue.    Since his last visit, he was seen in the emergency room and by urology for hematuria which was thought to be related to taking additional doses of Xarelto by mistake.  He has had cost issues with other medications and currently has a 1 month supply of Pradaxa that was given to him he thinks by his primary care physician.  He prefers to use apixaban if possible and samples were given to him along with a prescription for                                                            [facility]     Medical History:  Past Medical History:   Diagnosis Date    Arthritis     Atrial fibrillation (CMS/HCC)     Edema     Enlarged prostate     Gastroesophageal reflux disease without esophagitis     Hypertension     Lipid disorder     ABNER (obstructive sleep apnea) 5/15/2024       Surgical History:  Past Surgical History:   Procedure Laterality Date    CARPAL TUNNEL RELEASE Bilateral     COLECTOMY      EYE SURGERY      NOSE SURGERY         Social History:  Social History     Tobacco Use    Smoking status: Former     Years: 15     Types: Cigarettes     Quit date:      Years since quittin.4    Smokeless tobacco: Never   Vaping Use    Vaping Use: Never used   Substance Use Topics    Alcohol use: Not Currently    Drug use: Never       Family History: He indicated that his biological mother is . He indicated that his biological father is . He indicated that the status of his biological brother is unknown. He indicated that his maternal grandmother is . He indicated that his maternal grandfather is .      Allergies:Acetaminophen, Lisinopril, and Oxycodone    Current Medications:    Outpatient Encounter Medications as of 5/15/2024:     apixaban (ELIQUIS) 5 mg tablet, Take 1 tablet (5 mg total) by mouth 2 (two) times a day.    atorvastatin (LIPITOR) 40 mg tablet, Take 40 mg by mouth nightly.    fluticasone propionate (FLONASE) 50 mcg/actuation nasal spray, Administer 2 sprays into each nostril as needed.    folic acid (FOLVITE) 1 mg tablet, Take 1 mg by mouth daily.    hydrOXYchloroQUINE (PLAQUENIL) 200 mg tablet, Take 400 mg by mouth daily.    inFLIXimab (REMICADE) 100 mg injection, Infuse into a venous catheter.    ipratropium (ATROVENT) 42 mcg (0.06 %) nasal spray, Administer 2 sprays into each nostril as needed.    METHOTREXATE SODIUM ORAL, Inject into the shoulder, thigh, or buttocks once a week.    metoprolol tartrate (LOPRESSOR) 25 mg  "tablet, Take 1 tablet (25 mg total) by mouth 2 (two) times a day.    pantoprazole (PROTONIX) 40 mg EC tablet, Take 40 mg by mouth as needed.    sildenafiL (VIAGRA) 100 mg tablet, Take 100 mg by mouth as needed.    solifenacin (VESICARE) 10 mg tablet, Take 10 mg by mouth every other day.    tamsulosin (FLOMAX) 0.4 mg capsule, Take 0.4 mg by mouth daily.    testosterone cypionate (DEPO-TESTOTERONE) 200 mg/mL injection, every 28 (twentyeight) days.    oxybutynin XL (DITROPAN XL) 15 mg 24 hr tablet, TAKE ONE TABLET BY MOUTH IN THE MORNING AS DIRECTED    [DISCONTINUED] apixaban (ELIQUIS) 5 mg tablet, Take 1 tablet (5 mg total) by mouth 2 (two) times a day.    [DISCONTINUED] dabigatran etexilate (PRADAXA) 150 mg capsule, Take 150 mg by mouth 2 (two) times a day.    [DISCONTINUED] furosemide (LASIX) 20 mg tablet, Take 1 tablet (20 mg total) by mouth 3 (three) times a week (Mon, Wed, Fri). (Patient not taking: Reported on 5/15/2024)    [DISCONTINUED] rivaroxaban (XARELTO) 20 mg tablet, Take 1 tablet (20 mg total) by mouth daily with dinner.                                                          OBJECTIVE   ROS as in HPI   Constitution: Negative for chills and fever.  Positive fatigue  Eyes: Negative for blurred vision and visual disturbance.   Cardiovascular: Negative for chest pain, dyspnea on exertion, near-syncope, palpitations and syncope.   Respiratory: Negative for hemoptysis, longstanding shortness of breath as noted above.  Hematologic/Lymphatic: No abnormal bleeding  Skin: Negative for rash. No new skin changes  Gastrointestinal: Negative for abdominal pain, diarrhea, hematochezia, melena, nausea and vomiting.   Genitourinary: Negative for dysuria and hematuria.   Neurological: Negative for headaches.   No lower extremity edema           Vitals:    05/15/24 1143   BP: 110/72   BP Location: Left upper arm   Patient Position: Sitting   Pulse: 81   Resp: 18   Weight: (!) 142 kg (313 lb)   Height: 1.829 m (6' 0.01\") " "        BP Readings from Last 3 Encounters:   05/15/24 110/72   02/14/24 130/80   10/13/23 128/84     Wt Readings from Last 3 Encounters:   05/15/24 (!) 142 kg (313 lb)   02/14/24 (!) 143 kg (314 lb 6.4 oz)   10/13/23 (!) 137 kg (302 lb)       Physical Exam   Constitutional: Appears comfortable in no distress  HEENT:  Neck Supple.  No JVD upright no carotid bruits   Head: Normocephalic.   Eyes: Extraocular movements intact  Cardiovascular: Normal rate, regular rhythm 1 out of 6 systolic murmur left sternal border Exam reveals no friction rub.    Pulmonary/Chest: Effort normal and breath sounds normal. No wheezes.  Abdominal: Soft and nontender. Bowel sounds are normal.   Musculoskeletal: No significant lower extremity edema  Neurological: Alert and oriented to person, place, and time.   Skin: Skin is warm and dry.   Psychiatric: Behavior is normal.            Objective   Lab Results   Component Value Date    CHOL 140 10/13/2023    CHOL 134 03/29/2023     Lab Results   Component Value Date    HDL 58 10/13/2023    HDL 63 03/29/2023     Lab Results   Component Value Date    LDLCALC 60 10/13/2023    LDLCALC 56 03/29/2023     Lab Results   Component Value Date    TRIG 139 10/13/2023    TRIG 73 03/29/2023     Lab Results   Component Value Date    ALT 29 10/13/2023    AST 26 10/13/2023     Lab Results   Component Value Date    WBC 9.2 10/13/2023    HGB 12.8 (L) 10/13/2023    HCT 38.9 10/13/2023     10/13/2023     Lab Results   Component Value Date    GLUCOSE 102 (H) 03/29/2023     03/29/2023    K 4.5 03/29/2023    CO2 30 03/29/2023     03/29/2023    BUN 13 03/29/2023    CREATININE 0.81 03/29/2023     No results found for: \"HGBA1C\"  Lab Results   Component Value Date    TSH 0.50 08/31/2022     Lab Results   Component Value Date     (H) 08/31/2022     [unfilled]    Troponin I Results    No lab values to display.       No results found for: \"HSTROPONINI\"                                                  "      IMAGING     Monitor 3 days 2-2024  -Heart rate 53 to 129 bpm with average heart rate 72 bpm  -452 episodes of SVT with average heart rate 124 bpm  -No ventricular tachycardia no atrial fibrillation no high-grade AV block no pauses  -No patient event triggers        Transthoracic echocardiogram 10-   Left Ventricle: Normal ventricle size. Mild concentric left ventricular hypertrophy. Preserved systolic function. Estimated EF 55-60%. Unable to assess regional wall motion abnormalities. Grade II diastolic dysfunction.    Right Ventricle: Normal ventricle size. Normal systolic function.    Left Atrium: Mildly dilated atrium.    Right Atrium: Normal sized atrium.    Aortic Valve: Tricuspid valve.  Sclerotic leaflets. Trace regurgitation. No stenosis.    Mitral Valve: Sclerotic mitral valve. Normal leaflet motion. Mild mitral annular calcification. Mild regurgitation. No stenosis.    Tricuspid Valve: Normal structure. Mild regurgitation. Estimated RVSP = 50 mmHg.    Aorta: Aortic root top normal. Ascending aorta normal-sized.    Pericardium: No evidence of pericardial effusion.         Cardiac catheterization 4-6-2023  Coronary Angiogram/Left Heart Catheterization   1.  60% focal stenosis of the mid LAD.  iFR negative, 0.97.  2.  Minimal luminal irregularities in the remaining coronary tree.     Right Heart Catheterization  1. Normal right and left heart filling pressures (RA 7, PA 34/16 (22), and PCW 12 mm Hg).  2. Cardiac output and index of 6.56 L/min and 2.7 L/min/m2 respectively.   3. SVR 1080 dynes  4. PVR 1.5 wood units    Trans [facility]ogram [facility]-   Normal left ventricular size. Normal left ventricular wall thickness. Normal left   ventricular systolic function. LV Ejection Fraction was 55 %.     Findings:   Study Quality:   Technically Difficult. Intravenous contrast utilized to enhance endocardial visualization.   Left Ventricle:   Normal left ventricular size. Normal left  ventricular wall thickness. Normal left   ventricular systolic function. LV Ejection Fraction was 55 %.   Diastolic Function:   Mitral valve inflow pattern and Tissue Doppler imaging within normal limits for age.   Right Ventricle:   Normal right ventricular systolic function. Normal right ventricular size. TAPSE 2.9 cm.   Left Atrium:   The left atrium is normal in size. Left atrial volume index 11 ml/m2.   Right Atrium:   The right atrium is normal in size. Right Atrial Volume Index 7 ml/m2.   Atrial Septum:   Not well visualized.   Mitral Valve:   Mild mitral annular calcification. Mitral valve leaflets appear mildly thickened. Trivial   mitral regurgitation.   Aortic Valve:   Trileaflet aortic valve. No aortic regurgitation. No hemodynamically significant aortic   stenosis.   Tricuspid Valve:   Normal appearance of the tricuspid valve. There is trivial tricuspid regurgitation. RV   systolic pressure cannot be determined due to lack of a tricuspid regurgitation Doppler   signal.   Pulmonic Valve:   Normal appearance of the pulmonic valve.   Pericardium:   Normal pericardium with no significant pericardial effusion.   Aorta:   Normal-sized aortic root and ascending aorta.   IVC:   Normal size IVC with respiratory collapse consistent with a right atrial pressure of 3   mmHg. IVC diameter 1.6 cm.        Coronary CTA 4-  1.  Technically difficult study.  Significant artifact affects interpretation.  Calcified plaque seen in the left main as well as the proximal left anterior  descending and circumflex.  The vessels appear most likely patent.  However,  severity of any stenosis could not be definitively determined due to significant  artifact.  Clinical correlation suggested.  Distal vessels not well-visualized.    LEFT MAIN: Moderate amount of circumferential calcified plaque seen.  There  appears to be a mild (30-50%) associated stenosis.  However, due to artifact,  the exact severity of stenosis could not be  determined..  The vessel appears  most likely patent.     LAD:  Proximal: Diffuse calcified plaque seen.  Severity of stenosis could not be  determined due to artifact..  The vessel appears most likely patent.  Mid: Diffuse calcified plaque seen.  Severity of stenosis cannot be determined  due to artifact..  The vessel appears most likely patent.  Distal: Not well seen..  DIAG 1: Not well seen..  DIAG 2: Not well seen..     LCX:  Proximal: Diffuse calcified plaque seen.  The severity of stenosis could not be  determined due to artifact.  The vessel appears most likely patent.  Mid: Calcified plaque seen.  Severity of stenosis cannot be determined due to  artifact..  The vessel appears most likely patent.  Distal: Not well seen.  OM1:  Not well seen.  LPLB: Not well seen.  LPDA: Not well seen.     RCA:  Right coronary artery not well-visualized due to motion artifact..  No calcified  plaque seen.  Small nondominant vessel.     2.  Cardiac Structures: Normal.  3.  Normal left ventricular systolic function.  4.  Coronary calcium score 837.  This places the patient in the DIAZ 85th risk  percentile for age and gender matched individuals.  5.  Overall quality of the scan was poor.         EKG 5/15/2024   Normal sinus rhythm 81bpm [id number]  Nonspecific ST and T wave abnormality  Prolonged QT interval or tu fusion, consider myocardial disease, electrolyte imbalance, or drug effects  Abnormal ECG                                            ASSESSMENT AND PLAN   Mr. Pappas is a 69-year-old gentleman with the following issues:    Assessment/Plan    SOB (shortness of breath)  Multifactorial related to sleep apnea sinus issues and weight.  Coronary catheterization showed wedge pressure 12 mmHg.  Have asked patient to use CPAP and follow-up with pulmonary.    No complaints of syncope    PAF (paroxysmal atrial fibrillation) (CMS/[facility])  Paroxysmal atrial fibrillation 6-2022 at [facility] when he had a urinary tract  infection.  Had been on Pradaxa 150 mg twice daily, then changed to Xarelto 20 mg daily.  Took extra doses of Xarelto by mistake resulting in hematuria.  Now wishes to go back to apixaban 5 mg twice daily.  If there is a cost issue he will call the office    Primary hypertension  Is tolerating metoprolol tartrate 25 mg twice daily    Coronary artery disease involving native coronary artery of native heart without angina pectoris  CTA 4-2021 shows no high-grade lesions but visualization limited.  Coronary angiogram 2023 shows 60% LAD lesion which is IFR negative.    As patient on anticoagulation for atrial fibrillation not started on aspirin at this time    Continue with metoprolol and atorvastatin.    Hyperlipidemia  Atorvastatin 40 mg p.o. daily.  Blood work 2-2024 LDL 75 triglycerides 146 HDL 55  4-2024 hemoglobin 13.5 platelet count 229 normal liver function tests    Lower extremity edema  Secondary to rheumatologic issues and gout.  Currently no significant edema.  Continue with furosemide 3 times weekly.    ABNER (obstructive sleep apnea)  cpap    Fatigue  No significant anemia on most recent blood work.  Cardiac monitor unremarkable.  Currently no cardiac etiology for his fatigue          Return in about 6 months (around 11/15/2024).           Thank you for allowing me to participate in the care of this patient.  I hope this information is helpful.         Ajay Bhat MD [email or web address]  5/15/2024  12:16 PM    This document was generated utilizing voice recognition technology. A reasonable attempt at proofreading has been made to minimize errors but please excuse any typographical errors which may be present. Please call with any questions.

## 2024-05-15 NOTE — LETTER
May 15, 2024     Kush Wen MD  [address] [facility] [id number]    Patient: Clint Pappas  YOB: 1955  Date of Visit: 5/15/2024      Dear Dr. Wen:    Thank you for referring Clint Pappas to me for evaluation. Below are my notes for this consultation.    If you have questions, please do not hesitate to call me. I look forward to following your patient along with you.         Sincerely,        Ajay Bhat MD        CC: No Recipients    Ajay Bhat MD  5/15/2024 12:17 PM  Sign when Signing Visit       Cardiology Outpatient Note    [facility] Office  [address] [place] [address]     [facility]  [facility]  [facility]  [address] [place] [address]     TEL  [phone]  [facility]     Clint Pappas is a 69 y.o. male patient here for follow-up.  Patient sees Dr. Masters for rheumatology    Patient has a history of rheumatoid arthritis, hypertension, obstructive sleep apnea, BPH, who was admitted to [facility] 6-2022 with a urinary tract infection acute renal insufficiency.  However for him his main issue is lower extremity/ankle edema.  He did not have a DVT by ultrasound and had an echocardiogram at that time which showed normal left ventricular function.  He had paroxysmal atrial fibrillation at that time and was loaded with digoxin, reverted to sinus rhythm, digoxin discontinued, and started on Pradaxa.  Patient states the cost is increasing and discussed other options for anticoagulation.    Patient has been evaluated for shortness of breath and had cardiac catheterization which showed nonobstructive coronary artery disease and normal wedge pressure.  He had been started on furosemide for lower extremity edema swelling which was ultimately thought to be related to his rheumatologic issues.    Patient has severe sleep apnea but needs a new CPAP machine and plans on getting in  touch with his pulmonologist.  He still has issues with arthritis which cause him fatigue.    Since his last visit, he was seen in the emergency room and by urology for hematuria which was thought to be related to taking additional doses of Xarelto by mistake.  He has had cost issues with other medications and currently has a 1 month supply of Pradaxa that was given to him he thinks by his primary care physician.  He prefers to use apixaban if possible and samples were given to him along with a prescription for                                                           PMH     Medical History:  Past Medical History:   Diagnosis Date   • Arthritis    • Atrial fibrillation (CMS/HCC)    • Edema    • Enlarged prostate    • Gastroesophageal reflux disease without esophagitis    • Hypertension    • Lipid disorder    • ABNER (obstructive sleep apnea) 5/15/2024       Surgical History:  Past Surgical History:   Procedure Laterality Date   • CARPAL TUNNEL RELEASE Bilateral    • COLECTOMY     • EYE SURGERY     • NOSE SURGERY         Social History:  Social History     Tobacco Use   • Smoking status: Former     Years: 15     Types: Cigarettes     Quit date:      Years since quittin.4   • Smokeless tobacco: Never   Vaping Use   • Vaping Use: Never used   Substance Use Topics   • Alcohol use: Not Currently   • Drug use: Never       Family History: He indicated that his biological mother is . He indicated that his biological father is . He indicated that the status of his biological brother is unknown. He indicated that his maternal grandmother is . He indicated that his maternal grandfather is .      Allergies:Acetaminophen, Lisinopril, and Oxycodone    Current Medications:    Outpatient Encounter Medications as of 5/15/2024:   •  apixaban (ELIQUIS) 5 mg tablet, Take 1 tablet (5 mg total) by mouth 2 (two) times a day.  •  atorvastatin (LIPITOR) 40 mg tablet, Take 40 mg by mouth nightly.  •   fluticasone propionate (FLONASE) 50 mcg/actuation nasal spray, Administer 2 sprays into each nostril as needed.  •  folic acid (FOLVITE) 1 mg tablet, Take 1 mg by mouth daily.  •  hydrOXYchloroQUINE (PLAQUENIL) 200 mg tablet, Take 400 mg by mouth daily.  •  inFLIXimab (REMICADE) 100 mg injection, Infuse into a venous catheter.  •  ipratropium (ATROVENT) 42 mcg (0.06 %) nasal spray, Administer 2 sprays into each nostril as needed.  •  METHOTREXATE SODIUM ORAL, Inject into the shoulder, thigh, or buttocks once a week.  •  metoprolol tartrate (LOPRESSOR) 25 mg tablet, Take 1 tablet (25 mg total) by mouth 2 (two) times a day.  •  pantoprazole (PROTONIX) 40 mg EC tablet, Take 40 mg by mouth as needed.  •  sildenafiL (VIAGRA) 100 mg tablet, Take 100 mg by mouth as needed.  •  solifenacin (VESICARE) 10 mg tablet, Take 10 mg by mouth every other day.  •  tamsulosin (FLOMAX) 0.4 mg capsule, Take 0.4 mg by mouth daily.  •  testosterone cypionate (DEPO-TESTOTERONE) 200 mg/mL injection, every 28 (twentyeight) days.  •  oxybutynin XL (DITROPAN XL) 15 mg 24 hr tablet, TAKE ONE TABLET BY MOUTH IN THE MORNING AS DIRECTED  •  [DISCONTINUED] apixaban (ELIQUIS) 5 mg tablet, Take 1 tablet (5 mg total) by mouth 2 (two) times a day.  •  [DISCONTINUED] dabigatran etexilate (PRADAXA) 150 mg capsule, Take 150 mg by mouth 2 (two) times a day.  •  [DISCONTINUED] furosemide (LASIX) 20 mg tablet, Take 1 tablet (20 mg total) by mouth 3 (three) times a week (Mon, Wed, Fri). (Patient not taking: Reported on 5/15/2024)  •  [DISCONTINUED] rivaroxaban (XARELTO) 20 mg tablet, Take 1 tablet (20 mg total) by mouth daily with dinner.                                                          OBJECTIVE   ROS as in HPI   Constitution: Negative for chills and fever.  Positive fatigue  Eyes: Negative for blurred vision and visual disturbance.   Cardiovascular: Negative for chest pain, dyspnea on exertion, near-syncope, palpitations and syncope.   Respiratory:  "Negative for hemoptysis, longstanding shortness of breath as noted above.  Hematologic/Lymphatic: No abnormal bleeding  Skin: Negative for rash. No new skin changes  Gastrointestinal: Negative for abdominal pain, diarrhea, hematochezia, melena, nausea and vomiting.   Genitourinary: Negative for dysuria and hematuria.   Neurological: Negative for headaches.   No lower extremity edema           Vitals:    05/15/24 1143   BP: 110/72   BP Location: Left upper arm   Patient Position: Sitting   Pulse: 81   Resp: 18   Weight: (!) 142 kg (313 lb)   Height: 1.829 m (6' 0.01\")         BP Readings from Last 3 Encounters:   05/15/24 110/72   02/14/24 130/80   10/13/23 128/84     Wt Readings from Last 3 Encounters:   05/15/24 (!) 142 kg (313 lb)   02/14/24 (!) 143 kg (314 lb 6.4 oz)   10/13/23 (!) 137 kg (302 lb)       Physical Exam   Constitutional: Appears comfortable in no distress  HEENT:  Neck Supple.  No JVD upright no carotid bruits   Head: Normocephalic.   Eyes: Extraocular movements intact  Cardiovascular: Normal rate, regular rhythm 1 out of 6 systolic murmur left sternal border Exam reveals no friction rub.    Pulmonary/Chest: Effort normal and breath sounds normal. No wheezes.  Abdominal: Soft and nontender. Bowel sounds are normal.   Musculoskeletal: No significant lower extremity edema  Neurological: Alert and oriented to person, place, and time.   Skin: Skin is warm and dry.   Psychiatric: Behavior is normal.            Objective   Lab Results   Component Value Date    CHOL 140 10/13/2023    CHOL 134 03/29/2023     Lab Results   Component Value Date    HDL 58 10/13/2023    HDL 63 03/29/2023     Lab Results   Component Value Date    LDLCALC 60 10/13/2023    LDLCALC 56 03/29/2023     Lab Results   Component Value Date    TRIG 139 10/13/2023    TRIG 73 03/29/2023     Lab Results   Component Value Date    ALT 29 10/13/2023    AST 26 10/13/2023     Lab Results   Component Value Date    WBC 9.2 10/13/2023    HGB 12.8 " "(L) 10/13/2023    HCT 38.9 10/13/2023     10/13/2023     Lab Results   Component Value Date    GLUCOSE 102 (H) 03/29/2023     03/29/2023    K 4.5 03/29/2023    CO2 30 03/29/2023     03/29/2023    BUN 13 03/29/2023    CREATININE 0.81 03/29/2023     No results found for: \"HGBA1C\"  Lab Results   Component Value Date    TSH 0.50 08/31/2022     Lab Results   Component Value Date     (H) 08/31/2022     [unfilled]    Troponin I Results    No lab values to display.       No results found for: \"HSTROPONINI\"                                                       IMAGING     Monitor 3 days 2-2024  -Heart rate 53 to 129 bpm with average heart rate 72 bpm  -452 episodes of SVT with average heart rate 124 bpm  -No ventricular tachycardia no atrial fibrillation no high-grade AV block no pauses  -No patient event triggers        Transthoracic echocardiogram 10-   Left Ventricle: Normal ventricle size. Mild concentric left ventricular hypertrophy. Preserved systolic function. Estimated EF 55-60%. Unable to assess regional wall motion abnormalities. Grade II diastolic dysfunction.  •  Right Ventricle: Normal ventricle size. Normal systolic function.  •  Left Atrium: Mildly dilated atrium.  •  Right Atrium: Normal sized atrium.  •  Aortic Valve: Tricuspid valve.  Sclerotic leaflets. Trace regurgitation. No stenosis.  •  Mitral Valve: Sclerotic mitral valve. Normal leaflet motion. Mild mitral annular calcification. Mild regurgitation. No stenosis.  •  Tricuspid Valve: Normal structure. Mild regurgitation. Estimated RVSP = 50 mmHg.  •  Aorta: Aortic root top normal. Ascending aorta normal-sized.  •  Pericardium: No evidence of pericardial effusion.         Cardiac catheterization 4-6-2023  Coronary Angiogram/Left Heart Catheterization   1.  60% focal stenosis of the mid LAD.  iFR negative, 0.97.  2.  Minimal luminal irregularities in the remaining coronary tree.     Right Heart Catheterization  1. Normal " right and left heart filling pressures (RA 7, PA 34/16 (22), and PCW 12 mm Hg).  2. Cardiac output and index of 6.56 L/min and 2.7 L/min/m2 respectively.   3. SVR 1080 dynes  4. PVR 1.5 wood units    Trans thoracic echocardiogram [facility] 6-   Normal left ventricular size. Normal left ventricular wall thickness. Normal left   ventricular systolic function. LV Ejection Fraction was 55 %.     Findings:   Study Quality:   Technically Difficult. Intravenous contrast utilized to enhance endocardial visualization.   Left Ventricle:   Normal left ventricular size. Normal left ventricular wall thickness. Normal left   ventricular systolic function. LV Ejection Fraction was 55 %.   Diastolic Function:   Mitral valve inflow pattern and Tissue Doppler imaging within normal limits for age.   Right Ventricle:   Normal right ventricular systolic function. Normal right ventricular size. TAPSE 2.9 cm.   Left Atrium:   The left atrium is normal in size. Left atrial volume index 11 ml/m2.   Right Atrium:   The right atrium is normal in size. Right Atrial Volume Index 7 ml/m2.   Atrial Septum:   Not well visualized.   Mitral Valve:   Mild mitral annular calcification. Mitral valve leaflets appear mildly thickened. Trivial   mitral regurgitation.   Aortic Valve:   Trileaflet aortic valve. No aortic regurgitation. No hemodynamically significant aortic   stenosis.   Tricuspid Valve:   Normal appearance of the tricuspid valve. There is trivial tricuspid regurgitation. RV   systolic pressure cannot be determined due to lack of a tricuspid regurgitation Doppler   signal.   Pulmonic Valve:   Normal appearance of the pulmonic valve.   Pericardium:   Normal pericardium with no significant pericardial effusion.   Aorta:   Normal-sized aortic root and ascending aorta.   IVC:   Normal size IVC with respiratory collapse consistent with a right atrial pressure of 3   mmHg. IVC diameter 1.6 cm.        Coronary CTA 4-  1.   Technically difficult study.  Significant artifact affects interpretation.  Calcified plaque seen in the left main as well as the proximal left anterior  descending and circumflex.  The vessels appear most likely patent.  However,  severity of any stenosis could not be definitively determined due to significant  artifact.  Clinical correlation suggested.  Distal vessels not well-visualized.    LEFT MAIN: Moderate amount of circumferential calcified plaque seen.  There  appears to be a mild (30-50%) associated stenosis.  However, due to artifact,  the exact severity of stenosis could not be determined..  The vessel appears  most likely patent.     LAD:  Proximal: Diffuse calcified plaque seen.  Severity of stenosis could not be  determined due to artifact..  The vessel appears most likely patent.  Mid: Diffuse calcified plaque seen.  Severity of stenosis cannot be determined  due to artifact..  The vessel appears most likely patent.  Distal: Not well seen..  DIAG 1: Not well seen..  DIAG 2: Not well seen..     LCX:  Proximal: Diffuse calcified plaque seen.  The severity of stenosis could not be  determined due to artifact.  The vessel appears most likely patent.  Mid: Calcified plaque seen.  Severity of stenosis cannot be determined due to  artifact..  The vessel appears most likely patent.  Distal: Not well seen.  OM1:  Not well seen.  LPLB: Not well seen.  LPDA: Not well seen.     RCA:  Right coronary artery not well-visualized due to motion artifact..  No calcified  plaque seen.  Small nondominant vessel.     2.  Cardiac Structures: Normal.  3.  Normal left ventricular systolic function.  4.  Coronary calcium score 837.  This places the patient in the DIAZ 85th risk  percentile for age and gender matched individuals.  5.  Overall quality of the scan was poor.         EKG 5/15/2024   Normal sinus rhythm 81bpm [id number]  Nonspecific ST and T wave abnormality  Prolonged QT interval or tu fusion, consider myocardial  disease, electrolyte imbalance, or drug effects  Abnormal ECG                                            ASSESSMENT AND PLAN   Mr. Pappas is a 69-year-old gentleman with the following issues:    Assessment/Plan    SOB (shortness of breath)  Multifactorial related to sleep apnea sinus issues and weight.  Coronary catheterization showed wedge pressure 12 mmHg.  Have asked patient to use CPAP and follow-up with pulmonary.    No complaints of syncope    PAF (paroxysmal atrial fibrillation) (CMS/[facility])  Paroxysmal atrial fibrillation 6-2022 at [facility] when he had a urinary tract infection.  Had been on Pradaxa 150 mg twice daily, then changed to Xarelto 20 mg daily.  Took extra doses of Xarelto by mistake resulting in hematuria.  Now wishes to go back to apixaban 5 mg twice daily.  If there is a cost issue he will call the office    Primary hypertension  Is tolerating metoprolol tartrate 25 mg twice daily    Coronary artery disease involving native coronary artery of native heart without angina pectoris  CTA 4-2021 shows no high-grade lesions but visualization limited.  Coronary angiogram 2023 shows 60% LAD lesion which is IFR negative.    As patient on anticoagulation for atrial fibrillation not started on aspirin at this time    Continue with metoprolol and atorvastatin.    Hyperlipidemia  Atorvastatin 40 mg p.o. daily.  Blood work 2-2024 LDL 75 triglycerides 146 HDL 55  4-2024 hemoglobin 13.5 platelet count 229 normal liver function tests    Lower extremity edema  Secondary to rheumatologic issues and gout.  Currently no significant edema.  Continue with furosemide 3 times weekly.    ABNER (obstructive sleep apnea)  cpap    Fatigue  No significant anemia on most recent blood work.  Cardiac monitor unremarkable.  Currently no cardiac etiology for his fatigue          Return in about 6 months (around 11/15/2024).           Thank you for allowing me to participate in the care of this patient.  I hope this  information is helpful.         Ajay Bhat MD [facility]  5/15/2024  12:16 PM    This document was generated utilizing voice recognition technology. A reasonable attempt at proofreading has been made to minimize errors but please excuse any typographical errors which may be present. Please call with any questions.

## 2024-05-15 NOTE — ASSESSMENT & PLAN NOTE
Secondary to rheumatologic issues and gout.  Currently no significant edema.  Continue with furosemide 3 times weekly.

## 2024-05-15 NOTE — ASSESSMENT & PLAN NOTE
Atorvastatin 40 mg p.o. daily.  Blood work 2-2024 LDL 75 triglycerides 146 HDL 55  4-2024 hemoglobin 13.5 platelet count 229 normal liver function tests

## 2024-10-15 ENCOUNTER — TELEPHONE (OUTPATIENT)
Dept: CARDIOLOGY | Facility: CLINIC | Age: 69
End: 2024-10-15
Payer: MEDICARE

## 2024-10-15 NOTE — TELEPHONE ENCOUNTER
"Agreed with all of above recommendations from triage RN [place].    Pt provided additional information in regards to SOB/chest pain, while on phone.  Pt stated that he can no longer walk one and half blocks with out getting short of breath.  Also in regards to chest pain which pt stated can happen all though out day, but pt feels that when he develops chest pain at night, he thinks it may be indigestion, which pt drinks \"spiked tea\" to help relieve.  Suggested to pt to speak to PCP in regards to possible antiacid medication.    Pt agreeable to all recommendations.  Scheduled pt to see [facility] nurse practitioner on Thursday 10/24/24.  Also reviewed with pt signs and symptoms when to go to ER.  Pt verbally understands and has no further questions at this time.  "

## 2024-10-15 NOTE — TELEPHONE ENCOUNTER
"Dr. Bhat patient    Last OV 5/15/2024 with scheduled follow up on 12/6/2024.    PMH notable for PAF, SOB, peripheral edema, rheumatoid arthritis, hypertension, severe obstructive sleep apnea, BPH, non obstructive coronary disease.      4/6/2023 cardiac catheterization with findings below:    60% focal stenosis of the mid LAD.  iFR negative, 0.97.  Minimal luminal irregularities in the remaining coronary tree.      10/13/2023 echo:    Left Ventricle: Normal ventricle size. Mild concentric left ventricular hypertrophy. Preserved systolic function. Estimated EF 55-60%. Unable to assess regional wall motion abnormalities. Grade II diastolic dysfunction.    Right Ventricle: Normal ventricle size. Normal systolic function.    Left Atrium: Mildly dilated atrium.    Right Atrium: Normal sized atrium.    Aortic Valve: Tricuspid valve.  Sclerotic leaflets. Trace regurgitation. No stenosis.    Mitral Valve: Sclerotic mitral valve. Normal leaflet motion. Mild mitral annular calcification. Mild regurgitation. No stenosis.    Tricuspid Valve: Normal structure. Mild regurgitation. Estimated RVSP = 50 mmHg.    Aorta: Aortic root top normal. Ascending aorta normal-sized.    Pericardium: No evidence of pericardial effusion.    Mr. Pappas's SOB in the past felt to be multifactorial-related to sleep apnea , sinus issues and weight.     Pt phoned this morning to report a few symptoms:    1) Over the past one month his chronic SOB has gotten worse. He used to be able to grocery shop walking the store but now needs to use a scooter to complete this task. He continues to have SOB walking steps, and at times when sitting in an upright position. He denies SOB when supine. His weight is unchanged and he has no lower extremity peripheral edema.    He wears his CPAP \"sparingly\" per his report because it results in phlegm production which he feels \"gets forced down his throat\" when he wears it.     I encouraged him to discuss this with both " "his PCP and his pulmonologist, especially in light of the severe nature of his sleep apnea.    2) Over the past one month he has developed (L) sided to mid sternal chest pain. Initially it happened only when he over exerted himself but now occurring with lesser activity. No rest symptoms. Lasts for 30-60 minutes before resolving on its own. He has chest pain 2-3x per week.    I asked him to stop and rest immediately once chest pain starts. I reviewed scenarios which would require ER evaluation.     3) Feeling fatigued and \"drained\" all the time. I'm sure this is at least in part due to inability to wear CPAP nightly.     He sees his rheumatologist weekly with BP readings generally 130/70 range but may be a bit higher at times to 140/80 range. He isn't certain about HR readings.    He can be reached at [phone] to discuss next steps.         "

## 2024-10-16 NOTE — TELEPHONE ENCOUNTER
If patient does not feel like he needs to go to the emergency room, he can be offered an appointment this Friday with me [id number] at 9:20 AM or 1:40 PM.  If he chooses to come in Friday he does not need to keep his appointment with [facility] 10-.

## 2024-10-18 ENCOUNTER — OFFICE VISIT (OUTPATIENT)
Dept: CARDIOLOGY | Facility: CLINIC | Age: 69
End: 2024-10-18
Payer: MEDICARE

## 2024-10-18 VITALS
SYSTOLIC BLOOD PRESSURE: 120 MMHG | HEIGHT: 72 IN | BODY MASS INDEX: 42.66 KG/M2 | HEART RATE: 78 BPM | WEIGHT: 315 LBS | RESPIRATION RATE: 18 BRPM | DIASTOLIC BLOOD PRESSURE: 72 MMHG

## 2024-10-18 DIAGNOSIS — R06.02 SOB (SHORTNESS OF BREATH): ICD-10-CM

## 2024-10-18 DIAGNOSIS — I10 PRIMARY HYPERTENSION: ICD-10-CM

## 2024-10-18 DIAGNOSIS — I25.10 CORONARY ARTERY DISEASE INVOLVING NATIVE CORONARY ARTERY OF NATIVE HEART WITHOUT ANGINA PECTORIS: ICD-10-CM

## 2024-10-18 DIAGNOSIS — I48.0 PAF (PAROXYSMAL ATRIAL FIBRILLATION) (CMS/HCC): Primary | ICD-10-CM

## 2024-10-18 DIAGNOSIS — G47.33 OSA (OBSTRUCTIVE SLEEP APNEA): ICD-10-CM

## 2024-10-18 DIAGNOSIS — I51.9 HEART DISEASE, UNSPECIFIED: ICD-10-CM

## 2024-10-18 DIAGNOSIS — R60.0 LOWER EXTREMITY EDEMA: ICD-10-CM

## 2024-10-18 LAB
ATRIAL RATE: 78
P AXIS: 60
PR INTERVAL: 184
QRS DURATION: 82
QT INTERVAL: 394
QTC CALCULATION(BAZETT): 449
R AXIS: 48
T WAVE AXIS: 33
VENTRICULAR RATE: 78

## 2024-10-18 PROCEDURE — G8754 DIAS BP LESS 90: HCPCS | Performed by: INTERNAL MEDICINE

## 2024-10-18 PROCEDURE — G8752 SYS BP LESS 140: HCPCS | Performed by: INTERNAL MEDICINE

## 2024-10-18 PROCEDURE — G2211 COMPLEX E/M VISIT ADD ON: HCPCS | Performed by: INTERNAL MEDICINE

## 2024-10-18 PROCEDURE — 99215 OFFICE O/P EST HI 40 MIN: CPT | Performed by: INTERNAL MEDICINE

## 2024-10-18 PROCEDURE — 93000 ELECTROCARDIOGRAM COMPLETE: CPT | Performed by: INTERNAL MEDICINE

## 2024-10-18 RX ORDER — ALBUTEROL SULFATE 90 UG/1
2 INHALANT RESPIRATORY (INHALATION) EVERY 4 HOURS PRN
COMMUNITY
Start: 2024-10-15 | End: 2025-10-10

## 2024-10-18 RX ORDER — PREDNISONE 20 MG/1
TABLET ORAL
COMMUNITY
Start: 2024-10-15

## 2024-10-18 NOTE — ASSESSMENT & PLAN NOTE
Multifactorial related to sleep apnea sinus issues and weight.  Coronary catheterization 2023 showed wedge pressure 12 mmHg.      Has had shortness of breath which is new in the last month and can walk only half a block compared to 1 block.  Currently in sinus rhythm and monitor 2-2024 shows SVT but no atrial fibrillation.  Patient is on beta-blocker and we reviewed his medications.  Denies melena.  Have asked patient to follow-up with his pulmonologist because of his issues with his CPAP device which may be causing his symptoms.  Have also asked him to have blood work to exclude significant anemia.  He is currently in sinus rhythm and presentation is not suggestive of volume overload.

## 2024-10-18 NOTE — ASSESSMENT & PLAN NOTE
Paroxysmal atrial fibrillation 6-2022 at [facility] when he had a urinary tract infection.  Apixaban 5 mg twice daily

## 2024-10-18 NOTE — LETTER
October 18, 2024     Kush Wen MD  [address] [facility] [id number]    Patient: Clint Pappas  YOB: 1955  Date of Visit: 10/18/2024      Dear Dr. Wen:    Thank you for referring Clint Pappas to me for evaluation. Below are my notes for this consultation.    If you have questions, please do not hesitate to call me. I look forward to following your patient along with you.         Sincerely,        Ajay Bhat MD        CC: No Recipients    Ajay Bhat MD  10/18/2024 10:45 AM  Sign when Signing Visit       Cardiology Outpatient Note    [facility] Office  [address] [place] [address]     [facility]  [facility] Level  [address] [place] [address]     TEL  [phone]  [facility]     Clint Pappas is a 69 y.o. male patient here for follow-up.  Patient sees Dr. Masters for rheumatology    Patient has a history of rheumatoid arthritis, hypertension, obstructive sleep apnea, BPH, who was admitted to [facility] 6-2022 with a urinary tract infection acute renal insufficiency.  However for him his main issue is lower extremity/ankle edema.  He did not have a DVT by ultrasound and had an echocardiogram at that time which showed normal left ventricular function.  He had paroxysmal atrial fibrillation at that time and was loaded with digoxin, reverted to sinus rhythm, digoxin discontinued, and started on Pradaxa.  Patient states the cost is increasing and discussed other options for anticoagulation.    Patient has been evaluated for shortness of breath and had cardiac catheterization which showed nonobstructive coronary artery disease and normal wedge pressure.  He lower extremity edema swelling which was ultimately thought to be related to his rheumatologic issues.    Patient has severe sleep apnea and received a new CPAP machine which she has used in the last month and a half.   During that time he has noticed more shortness of breath and feels like he is congested from his machine.  Previously he could walk 1 block and now he can walk only half a block without becoming short of breath.  Denies any lower extremity weight gain.  No pronounced palpitations or chest discomfort.  No melena or bleeding.  No hematuria-previously seen by urology for hematuria which was thought to occur in the setting of taking additional doses of Xarelto by mistake                                                               [facility]     Medical History:  Past Medical History:   Diagnosis Date   • Arthritis    • Atrial fibrillation (CMS/HCC)    • Edema    • Enlarged prostate    • Gastroesophageal reflux disease without esophagitis    • Hypertension    • Lipid disorder    • ABNER (obstructive sleep apnea) 5/15/2024       Surgical History:  Past Surgical History   Procedure Laterality Date   • Carpal tunnel release Bilateral    • Colectomy     • Eye surgery     • iFR - initial vessel N/A 2023    [place] [facility] [place]   • Nose surgery     • RIGHT & LEFT HEART CATH WITH CORONARY ANGIOGRAPHY N/A 2023    [place] [facility] [place]       Social History:  Social History     Tobacco Use   • Smoking status: Former     Current packs/day: 0.00     Types: Cigarettes     Start date:      Quit date:      Years since quittin.8   • Smokeless tobacco: Never   Vaping Use   • Vaping status: Never Used   Substance Use Topics   • Alcohol use: Not Currently   • Drug use: Never       Family History: He indicated that his biological mother is . He indicated that his biological father is . He indicated that the status of his biological brother is unknown. He indicated that his maternal grandmother is . He indicated that his maternal grandfather is .      Allergies:Acetaminophen, Lisinopril, Oxycodone, and  Oxycodone-acetaminophen    Current Medications:    Outpatient Encounter Medications as of 10/18/2024:   •  albuterol HFA 90 mcg/actuation inhaler, Inhale 2 puffs Every 4 hours as needed.  •  apixaban (ELIQUIS) 5 mg tablet, Take 1 tablet (5 mg total) by mouth 2 (two) times a day.  •  atorvastatin (LIPITOR) 40 mg tablet, Take 40 mg by mouth nightly.  •  fluticasone propionate (FLONASE) 50 mcg/actuation nasal spray, Administer 2 sprays into each nostril as needed.  •  folic acid (FOLVITE) 1 mg tablet, Take 1 mg by mouth daily.  •  hydrOXYchloroQUINE (PLAQUENIL) 200 mg tablet, Take 400 mg by mouth daily.  •  inFLIXimab (REMICADE) 100 mg injection, Infuse into a venous catheter.  •  ipratropium (ATROVENT) 42 mcg (0.06 %) nasal spray, Administer 2 sprays into each nostril as needed.  •  METHOTREXATE SODIUM ORAL, Inject into the shoulder, thigh, or buttocks once a week.  •  metoprolol tartrate (LOPRESSOR) 25 mg tablet, Take 1 tablet (25 mg total) by mouth 2 (two) times a day.  •  oxybutynin XL (DITROPAN XL) 15 mg 24 hr tablet, TAKE ONE TABLET BY MOUTH IN THE MORNING AS DIRECTED  •  pantoprazole (PROTONIX) 40 mg EC tablet, Take 40 mg by mouth as needed.  •  predniSONE (DELTASONE) 20 mg tablet,   •  sildenafiL (VIAGRA) 100 mg tablet, Take 100 mg by mouth as needed.  •  solifenacin (VESICARE) 10 mg tablet, Take 10 mg by mouth every other day.  •  tamsulosin (FLOMAX) 0.4 mg capsule, Take 0.4 mg by mouth daily.  •  testosterone cypionate (DEPO-TESTOTERONE) 200 mg/mL injection, every 28 (twentyeight) days.                                                          OBJECTIVE   ROS as in HPI   Constitution: Negative for chills and fever.  Positive fatigue  Eyes: Negative for blurred vision and visual disturbance.   Cardiovascular: Negative for chest pain,  near-syncope, palpitations and syncope.   Respiratory: Negative for hemoptysis, shortness of breath as noted above.  Hematologic/Lymphatic: No abnormal bleeding  Skin: Negative  for rash. No new skin changes  Gastrointestinal: Negative for abdominal pain, diarrhea, hematochezia, melena, nausea and vomiting.   Genitourinary: Negative for dysuria and hematuria.   Neurological: Negative for headaches.              Vitals:    10/18/24 0924   BP: 120/72   BP Location: Left upper arm   Patient Position: Sitting   Pulse: 78   Resp: 18   Weight: (!) 144 kg (318 lb)   Height: 1.829 m (6')           BP Readings from Last 3 Encounters:   10/18/24 120/72   05/15/24 110/72   02/14/24 130/80     Wt Readings from Last 3 Encounters:   10/18/24 (!) 144 kg (318 lb)   05/15/24 (!) 142 kg (313 lb)   02/14/24 (!) 143 kg (314 lb 6.4 oz)       Physical Exam   Constitutional: Appears comfortable in no distress  HEENT:  Neck Supple.  No JVD upright no carotid bruits   Head: Normocephalic.   Eyes: Extraocular movements intact  Cardiovascular: Normal rate, regular rhythm 1 out of 6 systolic murmur left sternal border Exam reveals no friction rub.    Pulmonary/Chest: Effort normal and breath sounds normal. No wheezes.  Abdominal: Soft and nontender. Bowel sounds are normal.   Musculoskeletal: Trace pedal edema  Neurological: Alert and oriented to person, place, and time.   Skin: Skin is warm and dry.   Psychiatric: Behavior is normal.            Objective   Lab Results   Component Value Date    CHOL 140 10/13/2023    CHOL 134 03/29/2023     Lab Results   Component Value Date    HDL 58 10/13/2023    HDL 63 03/29/2023     Lab Results   Component Value Date    LDLCALC 60 10/13/2023    LDLCALC 56 03/29/2023     Lab Results   Component Value Date    TRIG 139 10/13/2023    TRIG 73 03/29/2023     Lab Results   Component Value Date    ALT 29 10/13/2023    AST 26 10/13/2023     Lab Results   Component Value Date    WBC 9.2 10/13/2023    HGB 12.8 (L) 10/13/2023    HCT 38.9 10/13/2023     10/13/2023     Lab Results   Component Value Date    GLUCOSE 102 (H) 03/29/2023     03/29/2023    K 4.5 03/29/2023    CO2 30  "03/29/2023     03/29/2023    BUN 13 03/29/2023    CREATININE 0.81 03/29/2023     No results found for: \"HGBA1C\"  Lab Results   Component Value Date    TSH 0.50 08/31/2022     Lab Results   Component Value Date     (H) 08/31/2022     [unfilled]    Troponin I Results    No lab values to display.       No results found for: \"HSTROPONINI\"                                                       IMAGING     Monitor 3 days 2-2024  -Heart rate 53 to 129 bpm with average heart rate 72 bpm  -452 episodes of SVT with average heart rate 124 bpm  -No ventricular tachycardia no atrial fibrillation no high-grade AV block no pauses  -No patient event triggers        Transthoracic echocardiogram 10-   Left Ventricle: Normal ventricle size. Mild concentric left ventricular hypertrophy. Preserved systolic function. Estimated EF 55-60%. Unable to assess regional wall motion abnormalities. Grade II diastolic dysfunction.  •  Right Ventricle: Normal ventricle size. Normal systolic function.  •  Left Atrium: Mildly dilated atrium.  •  Right Atrium: Normal sized atrium.  •  Aortic Valve: Tricuspid valve.  Sclerotic leaflets. Trace regurgitation. No stenosis.  •  Mitral Valve: Sclerotic mitral valve. Normal leaflet motion. Mild mitral annular calcification. Mild regurgitation. No stenosis.  •  Tricuspid Valve: Normal structure. Mild regurgitation. Estimated RVSP = 50 mmHg.  •  Aorta: Aortic root top normal. Ascending aorta normal-sized.  •  Pericardium: No evidence of pericardial effusion.         Cardiac catheterization 4-6-2023  Coronary Angiogram/Left Heart Catheterization   1.  60% focal stenosis of the mid LAD.  iFR negative, 0.97.  2.  Minimal luminal irregularities in the remaining coronary tree.     Right Heart Catheterization  1. Normal right and left heart filling pressures (RA 7, PA 34/16 (22), and PCW 12 mm Hg).  2. Cardiac output and index of 6.56 L/min and 2.7 L/min/m2 respectively.   3. SVR 1080 dynes  4. PVR " 1.5 wood units    Trans thoracic echocardiogram [facility] 6-   Normal left ventricular size. Normal left ventricular wall thickness. Normal left   ventricular systolic function. LV Ejection Fraction was 55 %.     Findings:   Study Quality:   Technically Difficult. Intravenous contrast utilized to enhance endocardial visualization.   Left Ventricle:   Normal left ventricular size. Normal left ventricular wall thickness. Normal left   ventricular systolic function. LV Ejection Fraction was 55 %.   Diastolic Function:   Mitral valve inflow pattern and Tissue Doppler imaging within normal limits for age.   Right Ventricle:   Normal right ventricular systolic function. Normal right ventricular size. TAPSE 2.9 cm.   Left Atrium:   The left atrium is normal in size. Left atrial volume index 11 ml/m2.   Right Atrium:   The right atrium is normal in size. Right Atrial Volume Index 7 ml/m2.   Atrial Septum:   Not well visualized.   Mitral Valve:   Mild mitral annular calcification. Mitral valve leaflets appear mildly thickened. Trivial   mitral regurgitation.   Aortic Valve:   Trileaflet aortic valve. No aortic regurgitation. No hemodynamically significant aortic   stenosis.   Tricuspid Valve:   Normal appearance of the tricuspid valve. There is trivial tricuspid regurgitation. RV   systolic pressure cannot be determined due to lack of a tricuspid regurgitation Doppler   signal.   Pulmonic Valve:   Normal appearance of the pulmonic valve.   Pericardium:   Normal pericardium with no significant pericardial effusion.   Aorta:   Normal-sized aortic root and ascending aorta.   IVC:   Normal size IVC with respiratory collapse consistent with a right atrial pressure of 3   mmHg. IVC diameter 1.6 cm.        Coronary CTA 4-  1.  Technically difficult study.  Significant artifact affects interpretation.  Calcified plaque seen in the left main as well as the proximal left anterior  descending and circumflex.  The  vessels appear most likely patent.  However,  severity of any stenosis could not be definitively determined due to significant  artifact.  Clinical correlation suggested.  Distal vessels not well-visualized.    LEFT MAIN: Moderate amount of circumferential calcified plaque seen.  There  appears to be a mild (30-50%) associated stenosis.  However, due to artifact,  the exact severity of stenosis could not be determined..  The vessel appears  most likely patent.     LAD:  Proximal: Diffuse calcified plaque seen.  Severity of stenosis could not be  determined due to artifact..  The vessel appears most likely patent.  Mid: Diffuse calcified plaque seen.  Severity of stenosis cannot be determined  due to artifact..  The vessel appears most likely patent.  Distal: Not well seen..  DIAG 1: Not well seen..  DIAG 2: Not well seen..     LCX:  Proximal: Diffuse calcified plaque seen.  The severity of stenosis could not be  determined due to artifact.  The vessel appears most likely patent.  Mid: Calcified plaque seen.  Severity of stenosis cannot be determined due to  artifact..  The vessel appears most likely patent.  Distal: Not well seen.  OM1:  Not well seen.  LPLB: Not well seen.  LPDA: Not well seen.     RCA:  Right coronary artery not well-visualized due to motion artifact..  No calcified  plaque seen.  Small nondominant vessel.     2.  Cardiac Structures: Normal.  3.  Normal left ventricular systolic function.  4.  Coronary calcium score 837.  This places the patient in the DIAZ 85th risk  percentile for age and gender matched individuals.  5.  Overall quality of the scan was poor.         EKG 10/18/2024   Normal sinus rhythm 78bpm [id number]  Normal ECG  When compared with ECG of 15-MAY-2024 11:53,  Nonspecific T wave abnormality no longer evident in Lateral leads                                            ASSESSMENT AND PLAN   Mr. Pappas is a 69-year-old gentleman with the following issues:    Assessment/Plan    ABNER  (obstructive sleep apnea)  On CPAP.  Will follow-up with his pulmonologist because he feels congested using the device    SOB (shortness of breath)  Multifactorial related to sleep apnea sinus issues and weight.  Coronary catheterization 2023 showed wedge pressure 12 mmHg.      Has had shortness of breath which is new in the last month and can walk only half a block compared to 1 block.  Currently in sinus rhythm and monitor 2-2024 shows SVT but no atrial fibrillation.  Patient is on beta-blocker and we reviewed his medications.  Denies melena.  Have asked patient to follow-up with his pulmonologist because of his issues with his CPAP device which may be causing his symptoms.  Have also asked him to have blood work to exclude significant anemia.  He is currently in sinus rhythm and presentation is not suggestive of volume overload.    Coronary artery disease involving native coronary artery of native heart without angina pectoris  CTA 4-2021 shows no high-grade lesions but visualization limited.  Coronary angiogram 2023 shows 60% LAD lesion which is IFR negative.    As patient on anticoagulation for atrial fibrillation not started on aspirin at this time    Continue with metoprolol and atorvastatin.    PAF (paroxysmal atrial fibrillation) (CMS/[facility])  Paroxysmal atrial fibrillation 6-2022 at [facility] when he had a urinary tract infection.  Apixaban 5 mg twice daily    Primary hypertension  metoprolol tartrate 25 mg twice daily    Lower extremity edema  Secondary to rheumatologic issues and gout.  Currently no significant edema.    Hyperlipidemia  Atorvastatin 40 mg p.o. daily.  Blood work 2-2024 LDL 75 triglycerides 146 HDL 55  4-2024 hemoglobin 13.5 platelet count 229 normal liver function tests    Patient asked to bring his medications at time of next visit.  Will have blood work prior to his next visit          Return in about 2 weeks (around 11/1/2024), or 820am.           Thank you for allowing me  to participate in the care of this patient.  I hope this information is helpful.         Ajay Bhat MD [facility]  10/18/2024  10:44 AM    This document was generated utilizing voice recognition technology. A reasonable attempt at proofreading has been made to minimize errors but please excuse any typographical errors which may be present. Please call with any questions.

## 2024-10-18 NOTE — PROGRESS NOTES
Cardiology Outpatient Note    [facility] HEART [facility] Office  [address] [place] [address]     [facility] [address]  [address] [place] [address]     TEL  [phone]  [facility]     Clint Pappas is a 69 y.o. male patient here for follow-up.  Patient sees Dr. Masters for rheumatology    Patient has a history of rheumatoid arthritis, hypertension, obstructive sleep apnea, BPH, who was admitted to [facility] 6-2022 with a urinary tract infection acute renal insufficiency.  However for him his main issue is lower extremity/ankle edema.  He did not have a DVT by ultrasound and had an echocardiogram at that time which showed normal left ventricular function.  He had paroxysmal atrial fibrillation at that time and was loaded with digoxin, reverted to sinus rhythm, digoxin discontinued, and started on Pradaxa.  Patient states the cost is increasing and discussed other options for anticoagulation.    Patient has been evaluated for shortness of breath and had cardiac catheterization which showed nonobstructive coronary artery disease and normal wedge pressure.  He lower extremity edema swelling which was ultimately thought to be related to his rheumatologic issues.    Patient has severe sleep apnea and received a new CPAP machine which she has used in the last month and a half.  During that time he has noticed more shortness of breath and feels like he is congested from his machine.  Previously he could walk 1 block and now he can walk only half a block without becoming short of breath.  Denies any lower extremity weight gain.  No pronounced palpitations or chest discomfort.  No melena or bleeding.  No hematuria-previously seen by urology for hematuria which was thought to occur in the setting of taking additional doses of Xarelto by mistake                                                               [facility]      Medical History:  Past Medical History:   Diagnosis Date    Arthritis     Atrial fibrillation (CMS/HCC)     Edema     Enlarged prostate     Gastroesophageal reflux disease without esophagitis     Hypertension     Lipid disorder     ABNER (obstructive sleep apnea) 5/15/2024       Surgical History:  Past Surgical History   Procedure Laterality Date    Carpal tunnel release Bilateral     Colectomy      Eye surgery      iFR - initial vessel N/A 2023    [place] [facility] [place]    Nose surgery      RIGHT & LEFT HEART CATH WITH CORONARY ANGIOGRAPHY N/A 2023    [place] [facility] [place]       Social History:  Social History     Tobacco Use    Smoking status: Former     Current packs/day: 0.00     Types: Cigarettes     Start date:      Quit date:      Years since quittin.8    Smokeless tobacco: Never   Vaping Use    Vaping status: Never Used   Substance Use Topics    Alcohol use: Not Currently    Drug use: Never       Family History: He indicated that his biological mother is . He indicated that his biological father is . He indicated that the status of his biological brother is unknown. He indicated that his maternal grandmother is . He indicated that his maternal grandfather is .      Allergies:Acetaminophen, Lisinopril, Oxycodone, and Oxycodone-acetaminophen    Current Medications:    Outpatient Encounter Medications as of 10/18/2024:     albuterol HFA 90 mcg/actuation inhaler, Inhale 2 puffs Every 4 hours as needed.    apixaban (ELIQUIS) 5 mg tablet, Take 1 tablet (5 mg total) by mouth 2 (two) times a day.    atorvastatin (LIPITOR) 40 mg tablet, Take 40 mg by mouth nightly.    fluticasone propionate (FLONASE) 50 mcg/actuation nasal spray, Administer 2 sprays into each nostril as needed.    folic acid (FOLVITE) 1 mg tablet, Take 1 mg by mouth daily.    hydrOXYchloroQUINE (PLAQUENIL) 200 mg tablet,  Take 400 mg by mouth daily.    inFLIXimab (REMICADE) 100 mg injection, Infuse into a venous catheter.    ipratropium (ATROVENT) 42 mcg (0.06 %) nasal spray, Administer 2 sprays into each nostril as needed.    METHOTREXATE SODIUM ORAL, Inject into the shoulder, thigh, or buttocks once a week.    metoprolol tartrate (LOPRESSOR) 25 mg tablet, Take 1 tablet (25 mg total) by mouth 2 (two) times a day.    oxybutynin XL (DITROPAN XL) 15 mg 24 hr tablet, TAKE ONE TABLET BY MOUTH IN THE MORNING AS DIRECTED    pantoprazole (PROTONIX) 40 mg EC tablet, Take 40 mg by mouth as needed.    predniSONE (DELTASONE) 20 mg tablet,     sildenafiL (VIAGRA) 100 mg tablet, Take 100 mg by mouth as needed.    solifenacin (VESICARE) 10 mg tablet, Take 10 mg by mouth every other day.    tamsulosin (FLOMAX) 0.4 mg capsule, Take 0.4 mg by mouth daily.    testosterone cypionate (DEPO-TESTOTERONE) 200 mg/mL injection, every 28 (twentyeight) days.                                                          OBJECTIVE   ROS as in HPI   Constitution: Negative for chills and fever.  Positive fatigue  Eyes: Negative for blurred vision and visual disturbance.   Cardiovascular: Negative for chest pain,  near-syncope, palpitations and syncope.   Respiratory: Negative for hemoptysis, shortness of breath as noted above.  Hematologic/Lymphatic: No abnormal bleeding  Skin: Negative for rash. No new skin changes  Gastrointestinal: Negative for abdominal pain, diarrhea, hematochezia, melena, nausea and vomiting.   Genitourinary: Negative for dysuria and hematuria.   Neurological: Negative for headaches.              Vitals:    10/18/24 0924   BP: 120/72   BP Location: Left upper arm   Patient Position: Sitting   Pulse: 78   Resp: 18   Weight: (!) 144 kg (318 lb)   Height: 1.829 m (6')           BP Readings from Last 3 Encounters:   10/18/24 120/72   05/15/24 110/72   02/14/24 130/80     Wt Readings from Last 3 Encounters:   10/18/24 (!) 144 kg (318 lb)   05/15/24  "(!) 142 kg (313 lb)   02/14/24 (!) 143 kg (314 lb 6.4 oz)       Physical Exam   Constitutional: Appears comfortable in no distress  HEENT:  Neck Supple.  No JVD upright no carotid bruits   Head: Normocephalic.   Eyes: Extraocular movements intact  Cardiovascular: Normal rate, regular rhythm 1 out of 6 systolic murmur left sternal border Exam reveals no friction rub.    Pulmonary/Chest: Effort normal and breath sounds normal. No wheezes.  Abdominal: Soft and nontender. Bowel sounds are normal.   Musculoskeletal: Trace pedal edema  Neurological: Alert and oriented to person, place, and time.   Skin: Skin is warm and dry.   Psychiatric: Behavior is normal.            Objective   Lab Results   Component Value Date    CHOL 140 10/13/2023    CHOL 134 03/29/2023     Lab Results   Component Value Date    HDL 58 10/13/2023    HDL 63 03/29/2023     Lab Results   Component Value Date    LDLCALC 60 10/13/2023    LDLCALC 56 03/29/2023     Lab Results   Component Value Date    TRIG 139 10/13/2023    TRIG 73 03/29/2023     Lab Results   Component Value Date    ALT 29 10/13/2023    AST 26 10/13/2023     Lab Results   Component Value Date    WBC 9.2 10/13/2023    HGB 12.8 (L) 10/13/2023    HCT 38.9 10/13/2023     10/13/2023     Lab Results   Component Value Date    GLUCOSE 102 (H) 03/29/2023     03/29/2023    K 4.5 03/29/2023    CO2 30 03/29/2023     03/29/2023    BUN 13 03/29/2023    CREATININE 0.81 03/29/2023     No results found for: \"HGBA1C\"  Lab Results   Component Value Date    TSH 0.50 08/31/2022     Lab Results   Component Value Date     (H) 08/31/2022     [unfilled]    Troponin I Results    No lab values to display.       No results found for: \"HSTROPONINI\"                                                       IMAGING     Monitor 3 days 2-2024  -Heart rate 53 to 129 bpm with average heart rate 72 bpm  -452 episodes of SVT with average heart rate 124 bpm  -No ventricular tachycardia no atrial " fibrillation no high-grade AV block no pauses  -No patient event triggers        Transthoracic echocardiogram 10-   Left Ventricle: Normal ventricle size. Mild concentric left ventricular hypertrophy. Preserved systolic function. Estimated EF 55-60%. Unable to assess regional wall motion abnormalities. Grade II diastolic dysfunction.    Right Ventricle: Normal ventricle size. Normal systolic function.    Left Atrium: Mildly dilated atrium.    Right Atrium: Normal sized atrium.    Aortic Valve: Tricuspid valve.  Sclerotic leaflets. Trace regurgitation. No stenosis.    Mitral Valve: Sclerotic mitral valve. Normal leaflet motion. Mild mitral annular calcification. Mild regurgitation. No stenosis.    Tricuspid Valve: Normal structure. Mild regurgitation. Estimated RVSP = 50 mmHg.    Aorta: Aortic root top normal. Ascending aorta normal-sized.    Pericardium: No evidence of pericardial effusion.         Cardiac catheterization 4-6-2023  Coronary Angiogram/Left Heart Catheterization   1.  60% focal stenosis of the mid LAD.  iFR negative, 0.97.  2.  Minimal luminal irregularities in the remaining coronary tree.     Right Heart Catheterization  1. Normal right and left heart filling pressures (RA 7, PA 34/16 (22), and PCW 12 mm Hg).  2. Cardiac output and index of 6.56 L/min and 2.7 L/min/m2 respectively.   3. SVR 1080 dynes  4. PVR 1.5 wood units    Trans thoracic echocardiogram [facility] 6-   Normal left ventricular size. Normal left ventricular wall thickness. Normal left   ventricular systolic function. LV Ejection Fraction was 55 %.     Findings:   Study Quality:   Technically Difficult. Intravenous contrast utilized to enhance endocardial visualization.   Left Ventricle:   Normal left ventricular size. Normal left ventricular wall thickness. Normal left   ventricular systolic function. LV Ejection Fraction was 55 %.   Diastolic Function:   Mitral valve inflow pattern and Tissue Doppler imaging within  normal limits for age.   Right Ventricle:   Normal right ventricular systolic function. Normal right ventricular size. TAPSE 2.9 cm.   Left Atrium:   The left atrium is normal in size. Left atrial volume index 11 ml/m2.   Right Atrium:   The right atrium is normal in size. Right Atrial Volume Index 7 ml/m2.   Atrial Septum:   Not well visualized.   Mitral Valve:   Mild mitral annular calcification. Mitral valve leaflets appear mildly thickened. Trivial   mitral regurgitation.   Aortic Valve:   Trileaflet aortic valve. No aortic regurgitation. No hemodynamically significant aortic   stenosis.   Tricuspid Valve:   Normal appearance of the tricuspid valve. There is trivial tricuspid regurgitation. RV   systolic pressure cannot be determined due to lack of a tricuspid regurgitation Doppler   signal.   Pulmonic Valve:   Normal appearance of the pulmonic valve.   Pericardium:   Normal pericardium with no significant pericardial effusion.   Aorta:   Normal-sized aortic root and ascending aorta.   IVC:   Normal size IVC with respiratory collapse consistent with a right atrial pressure of 3   mmHg. IVC diameter 1.6 cm.        Coronary CTA 4-  1.  Technically difficult study.  Significant artifact affects interpretation.  Calcified plaque seen in the left main as well as the proximal left anterior  descending and circumflex.  The vessels appear most likely patent.  However,  severity of any stenosis could not be definitively determined due to significant  artifact.  Clinical correlation suggested.  Distal vessels not well-visualized.    LEFT MAIN: Moderate amount of circumferential calcified plaque seen.  There  appears to be a mild (30-50%) associated stenosis.  However, due to artifact,  the exact severity of stenosis could not be determined..  The vessel appears  most likely patent.     LAD:  Proximal: Diffuse calcified plaque seen.  Severity of stenosis could not be  determined due to artifact..  The vessel appears  most likely patent.  Mid: Diffuse calcified plaque seen.  Severity of stenosis cannot be determined  due to artifact..  The vessel appears most likely patent.  Distal: Not well seen..  DIAG 1: Not well seen..  DIAG 2: Not well seen..     LCX:  Proximal: Diffuse calcified plaque seen.  The severity of stenosis could not be  determined due to artifact.  The vessel appears most likely patent.  Mid: Calcified plaque seen.  Severity of stenosis cannot be determined due to  artifact..  The vessel appears most likely patent.  Distal: Not well seen.  OM1:  Not well seen.  LPLB: Not well seen.  LPDA: Not well seen.     RCA:  Right coronary artery not well-visualized due to motion artifact..  No calcified  plaque seen.  Small nondominant vessel.     2.  Cardiac Structures: Normal.  3.  Normal left ventricular systolic function.  4.  Coronary calcium score 837.  This places the patient in the DIAZ 85th risk  percentile for age and gender matched individuals.  5.  Overall quality of the scan was poor.         EKG 10/18/2024   Normal sinus rhythm 78bpm [id number]  Normal ECG  When compared with ECG of 15-MAY-2024 11:53,  Nonspecific T wave abnormality no longer evident in Lateral leads                                            ASSESSMENT AND PLAN   Mr. Pappas is a 69-year-old gentleman with the following issues:    Assessment/Plan    ABNER (obstructive sleep apnea)  On CPAP.  Will follow-up with his pulmonologist because he feels congested using the device    SOB (shortness of breath)  Multifactorial related to sleep apnea sinus issues and weight.  Coronary catheterization 2023 showed wedge pressure 12 mmHg.      Has had shortness of breath which is new in the last month and can walk only half a block compared to 1 block.  Currently in sinus rhythm and monitor 2-2024 shows SVT but no atrial fibrillation.  Patient is on beta-blocker and we reviewed his medications.  Denies melena.  Have asked patient to follow-up with his  pulmonologist because of his issues with his CPAP device which may be causing his symptoms.  Have also asked him to have blood work to exclude significant anemia.  He is currently in sinus rhythm and presentation is not suggestive of volume overload.    Coronary artery disease involving native coronary artery of native heart without angina pectoris  CTA 4-2021 shows no high-grade lesions but visualization limited.  Coronary angiogram 2023 shows 60% LAD lesion which is IFR negative.    As patient on anticoagulation for atrial fibrillation not started on aspirin at this time    Continue with metoprolol and atorvastatin.    PAF (paroxysmal atrial fibrillation) (CMS/[facility])  Paroxysmal atrial fibrillation 6-2022 at [facility] [address] when he had a urinary tract infection.  Apixaban 5 mg twice daily    Primary hypertension  metoprolol tartrate 25 mg twice daily    Lower extremity edema  Secondary to rheumatologic issues and gout.  Currently no significant edema.    Hyperlipidemia  Atorvastatin 40 mg p.o. daily.  Blood work 2-2024 LDL 75 triglycerides 146 HDL 55  4-2024 hemoglobin 13.5 platelet count 229 normal liver function tests    Patient asked to bring his medications at time of next visit.  Will have blood work prior to his next visit          Return in about 2 weeks (around 11/1/2024), or 820am.           Thank you for allowing me to participate in the care of this patient.  I hope this information is helpful.         Ajay Bhat MD [facility]  10/18/2024  10:44 AM    This document was generated utilizing voice recognition technology. A reasonable attempt at proofreading has been made to minimize errors but please excuse any typographical errors which may be present. Please call with any questions.

## 2024-10-23 LAB
ALBUMIN SERPL-MCNC: 3.6 G/DL (ref 3.6–5.1)
ALBUMIN/GLOB SERPL: 1.1 (CALC) (ref 1–2.5)
ALP SERPL-CCNC: 70 U/L (ref 35–144)
ALT SERPL-CCNC: 58 U/L (ref 9–46)
AST SERPL-CCNC: 32 U/L (ref 10–35)
BILIRUB SERPL-MCNC: 1 MG/DL (ref 0.2–1.2)
BNP SERPL-MCNC: 165 PG/ML
BUN SERPL-MCNC: 14 MG/DL (ref 7–25)
BUN/CREAT SERPL: ABNORMAL (CALC) (ref 6–22)
CALCIUM SERPL-MCNC: 8.4 MG/DL (ref 8.6–10.3)
CHLORIDE SERPL-SCNC: 102 MMOL/L (ref 98–110)
CHOLEST SERPL-MCNC: 149 MG/DL
CHOLEST/HDLC SERPL: 1.9 (CALC)
CO2 SERPL-SCNC: 28 MMOL/L (ref 20–32)
CREAT SERPL-MCNC: 0.83 MG/DL (ref 0.7–1.28)
EGFRCR SERPLBLD CKD-EPI 2021: 94 ML/MIN/1.73M2
ERYTHROCYTE [DISTWIDTH] IN BLOOD BY AUTOMATED COUNT: 14.2 % (ref 11–15)
GLOBULIN SER CALC-MCNC: 3.4 G/DL (CALC) (ref 1.9–3.7)
GLUCOSE SERPL-MCNC: 84 MG/DL (ref 65–99)
HCT VFR BLD AUTO: 40.8 % (ref 38.5–50)
HDLC SERPL-MCNC: 80 MG/DL
HGB BLD-MCNC: 12.7 G/DL (ref 13.2–17.1)
LDLC SERPL CALC-MCNC: 48 MG/DL (CALC)
MCH RBC QN AUTO: 28.5 PG (ref 27–33)
MCHC RBC AUTO-ENTMCNC: 31.1 G/DL (ref 32–36)
MCV RBC AUTO: 91.7 FL (ref 80–100)
NONHDLC SERPL-MCNC: 69 MG/DL (CALC)
PLATELET # BLD AUTO: 231 THOUSAND/UL (ref 140–400)
PMV BLD REES-ECKER: 10.3 FL (ref 7.5–12.5)
POTASSIUM SERPL-SCNC: 3.9 MMOL/L (ref 3.5–5.3)
PROT SERPL-MCNC: 7 G/DL (ref 6.1–8.1)
RBC # BLD AUTO: 4.45 MILLION/UL (ref 4.2–5.8)
SODIUM SERPL-SCNC: 140 MMOL/L (ref 135–146)
TRIGL SERPL-MCNC: 133 MG/DL
WBC # BLD AUTO: 8.8 THOUSAND/UL (ref 3.8–10.8)

## 2024-12-06 ENCOUNTER — OFFICE VISIT (OUTPATIENT)
Dept: CARDIOLOGY | Facility: CLINIC | Age: 69
End: 2024-12-06
Payer: MEDICARE

## 2024-12-06 ENCOUNTER — DOCUMENTATION (OUTPATIENT)
Dept: CARDIOLOGY | Facility: CLINIC | Age: 69
End: 2024-12-06

## 2024-12-06 ENCOUNTER — TELEPHONE (OUTPATIENT)
Dept: CARDIOLOGY | Facility: CLINIC | Age: 69
End: 2024-12-06

## 2024-12-06 VITALS
SYSTOLIC BLOOD PRESSURE: 140 MMHG | RESPIRATION RATE: 16 BRPM | DIASTOLIC BLOOD PRESSURE: 84 MMHG | HEART RATE: 71 BPM | WEIGHT: 315 LBS | HEIGHT: 72 IN | BODY MASS INDEX: 42.66 KG/M2

## 2024-12-06 DIAGNOSIS — I48.0 PAF (PAROXYSMAL ATRIAL FIBRILLATION) (CMS/HCC): ICD-10-CM

## 2024-12-06 DIAGNOSIS — G47.33 OSA (OBSTRUCTIVE SLEEP APNEA): ICD-10-CM

## 2024-12-06 DIAGNOSIS — E78.5 HYPERLIPIDEMIA, UNSPECIFIED HYPERLIPIDEMIA TYPE: ICD-10-CM

## 2024-12-06 DIAGNOSIS — R60.0 LOWER EXTREMITY EDEMA: ICD-10-CM

## 2024-12-06 DIAGNOSIS — I10 PRIMARY HYPERTENSION: ICD-10-CM

## 2024-12-06 DIAGNOSIS — I25.10 CORONARY ARTERY DISEASE INVOLVING NATIVE CORONARY ARTERY OF NATIVE HEART WITHOUT ANGINA PECTORIS: ICD-10-CM

## 2024-12-06 DIAGNOSIS — R06.02 SOB (SHORTNESS OF BREATH): Primary | ICD-10-CM

## 2024-12-06 LAB
ATRIAL RATE: 71
P AXIS: 61
PR INTERVAL: 186
QRS DURATION: 90
QT INTERVAL: 404
QTC CALCULATION(BAZETT): 439
R AXIS: 42
T WAVE AXIS: 24
VENTRICULAR RATE: 71

## 2024-12-06 PROCEDURE — 93000 ELECTROCARDIOGRAM COMPLETE: CPT | Performed by: INTERNAL MEDICINE

## 2024-12-06 PROCEDURE — G8753 SYS BP > OR = 140: HCPCS | Performed by: INTERNAL MEDICINE

## 2024-12-06 PROCEDURE — 99215 OFFICE O/P EST HI 40 MIN: CPT | Performed by: INTERNAL MEDICINE

## 2024-12-06 PROCEDURE — G2211 COMPLEX E/M VISIT ADD ON: HCPCS | Performed by: INTERNAL MEDICINE

## 2024-12-06 PROCEDURE — G8754 DIAS BP LESS 90: HCPCS | Performed by: INTERNAL MEDICINE

## 2024-12-06 RX ORDER — FUROSEMIDE 20 MG/1
20 TABLET ORAL DAILY
Start: 2024-12-06 | End: 2024-12-06

## 2024-12-06 RX ORDER — AZITHROMYCIN 250 MG/1
250 TABLET, FILM COATED ORAL DAILY
COMMUNITY
Start: 2024-12-03 | End: 2024-12-08

## 2024-12-06 RX ORDER — FUROSEMIDE 20 MG/1
20 TABLET ORAL 3 TIMES WEEKLY
Start: 2024-12-06 | End: 2025-06-04

## 2024-12-06 NOTE — TELEPHONE ENCOUNTER
I called and s/w pt  He is aware that medications below not equivalent to Eliquis.  He will  2 sample boxes and will include forms he can fill out to try to get at a lower cost.    He will  next week.

## 2024-12-06 NOTE — ASSESSMENT & PLAN NOTE
On CPAP.  [facility].  He says his previous pulmonologist has left the area.

## 2024-12-06 NOTE — LETTER
December 6, 2024     Kush Wen MD  [address] [facility] [address]    Patient: Clint Pappas  YOB: 1955  Date of Visit: 12/6/2024      Dear Dr. Wen:    Thank you for referring Clint Pappas to me for evaluation. Below are my notes for this consultation.    If you have questions, please do not hesitate to call me. I look forward to following your patient along with you.         Sincerely,        Ajay Bhat MD        CC: No Recipients    Ajay Bhat MD  12/6/2024  1:04 PM  Sign when Signing Visit       Cardiology Outpatient Note    formerly [facility] Office  [address] [place] [address]     [facility]  [facility]ili[facility] Level  [address] [place] [address]     TEL  [phone]  [facility]     Clint Pappas is a 69 y.o. male patient here for follow-up.  Patient sees Dr. Masters for rheumatology    Patient has a history of rheumatoid arthritis, hypertension, obstructive sleep apnea, BPH, who was admitted to [facility] 6-2022 with a urinary tract infection acute renal insufficiency.  However for him his main issue is lower extremity/ankle edema.  He did not have a DVT by ultrasound and had an echocardiogram at that time which showed normal left ventricular function.  He had paroxysmal atrial fibrillation at that time and was loaded with digoxin, reverted to sinus rhythm, digoxin discontinued, and started on Pradaxa.  Patient states the cost is increasing and discussed other options for anticoagulation.  He is currently on apixaban.  He said that his insurance company offered different and cheaper alternatives left the visit called back and the medications that he gave such as clopidogrel Jantoven and prasugrel are not equivalence of his apixaban.    Patient has been evaluated for shortness of breath and had cardiac catheterization which showed nonobstructive coronary artery disease  and normal wedge pressure.  He lower extremity edema swelling which was ultimately thought to be related to his rheumatologic issues.    Patient has severe sleep apnea and received a new CPAP machine-he has postnasal drip and thinks this may be related to his CPAP machine.  He was given the number for sleep medicine here at [facility] if he wishes to make an appointment.  His previous doctor he believes has left the area.    He was seen at [facility] in the emergency room 12-3-2024 and was felt to have a viral prodrome resulting in nasal congestion and shortness of breath.  Troponin values unremarkable, EKG was unremarkable.  Chest x-ray showed no pneumonia.  Patient given steroids albuterol and azithromycin and says that he feels better.                                                               PMH     Medical History:  Past Medical History:   Diagnosis Date   • Arthritis    • Atrial fibrillation (CMS/HCC)    • Edema    • Enlarged prostate    • Gastroesophageal reflux disease without esophagitis    • Hypertension    • Lipid disorder    • ABNER (obstructive sleep apnea) 05/15/2024   • SOB (shortness of breath)     may be infection in lung       Surgical History:  Past Surgical History   Procedure Laterality Date   • Carpal tunnel release Bilateral    • Colectomy     • Eye surgery     • iFR - initial vessel N/A 2023    Performed by Werner Cowart MD at [facility] CARDIAC CATH/EP   • Nose surgery     • RIGHT & LEFT HEART CATH WITH CORONARY ANGIOGRAPHY N/A 2023    Performed by Werner Cowart MD at [facility] CARDIAC CATH/EP       Social History:  Social History     Tobacco Use   • Smoking status: Former     Current packs/day: 0.00     Types: Cigarettes     Start date:      Quit date:      Years since quittin.9   • Smokeless tobacco: Never   Vaping Use   • Vaping status: Never Used   Substance Use Topics   • Alcohol use: Not Currently   • Drug use: Never       Family History: He indicated that  his biological mother is . He indicated that his biological father is . He indicated that the status of his biological brother is unknown. He indicated that his maternal grandmother is . He indicated that his maternal grandfather is .      Allergies:Acetaminophen, Lisinopril, Oxycodone, and Oxycodone-acetaminophen    Current Medications:    Outpatient Encounter Medications as of 2024:   •  albuterol HFA 90 mcg/actuation inhaler, Inhale 2 puffs Every 4 hours as needed.  •  apixaban (ELIQUIS) 5 mg tablet, Take 1 tablet (5 mg total) by mouth 2 (two) times a day.  •  atorvastatin (LIPITOR) 40 mg tablet, Take 40 mg by mouth nightly.  •  azithromycin (ZITHROMAX) 250 mg tablet, Take 250 mg by mouth daily.  •  fluticasone propionate (FLONASE) 50 mcg/actuation nasal spray, Administer 2 sprays into each nostril as needed.  •  folic acid (FOLVITE) 1 mg tablet, Take 1 mg by mouth daily.  •  furosemide (LASIX) 20 mg tablet, Take 1 tablet (20 mg total) by mouth 3 (three) times a week (Mon, Wed, Fri).  •  hydrOXYchloroQUINE (PLAQUENIL) 200 mg tablet, Take 400 mg by mouth daily.  •  inFLIXimab (REMICADE) 100 mg injection, Infuse into a venous catheter.  •  ipratropium (ATROVENT) 42 mcg (0.06 %) nasal spray, Administer 2 sprays into each nostril as needed.  •  METHOTREXATE SODIUM ORAL, Inject into the shoulder, thigh, or buttocks once a week.  •  metoprolol tartrate (LOPRESSOR) 25 mg tablet, Take 1 tablet (25 mg total) by mouth 2 (two) times a day.  •  oxybutynin XL (DITROPAN XL) 15 mg 24 hr tablet, TAKE ONE TABLET BY MOUTH IN THE MORNING AS DIRECTED  •  pantoprazole (PROTONIX) 40 mg EC tablet, Take 40 mg by mouth as needed.  •  predniSONE (DELTASONE) 20 mg tablet,   •  sildenafiL (VIAGRA) 100 mg tablet, Take 100 mg by mouth as needed.  •  solifenacin (VESICARE) 10 mg tablet, Take 10 mg by mouth every other day.  •  tamsulosin (FLOMAX) 0.4 mg capsule, Take 0.4 mg by mouth daily.  •  testosterone  cypionate (DEPO-TESTOTERONE) 200 mg/mL injection, every 28 (twentyeight) days.  •  [DISCONTINUED] furosemide (LASIX) 20 mg tablet, Take 1 tablet (20 mg total) by mouth daily.                                                          OBJECTIVE   ROS as in HPI   Constitution: Negative for chills and fever.    Eyes: Negative for blurred vision and visual disturbance.   Cardiovascular: Negative for chest pain,  near-syncope, palpitations and syncope.   Respiratory: Negative for hemoptysis  positive postnasal drip  Hematologic/Lymphatic: No abnormal bleeding  Skin: Negative for rash. No new skin changes  Gastrointestinal: Negative for abdominal pain, diarrhea, hematochezia, melena, nausea and vomiting.   Genitourinary: Negative for dysuria and hematuria.   Neurological: Negative for headaches.              Vitals:    12/06/24 1056   BP: (!) 140/84   BP Location: Left upper arm   Patient Position: Sitting   Pulse: 71   Resp: 16   Weight: (!) 145 kg (320 lb)   Height: 1.829 m (6')           BP Readings from Last 3 Encounters:   12/06/24 (!) 140/84   10/18/24 120/72   05/15/24 110/72     Wt Readings from Last 3 Encounters:   12/06/24 (!) 145 kg (320 lb)   10/18/24 (!) 144 kg (318 lb)   05/15/24 (!) 142 kg (313 lb)       Physical Exam   Constitutional: Appears comfortable in no distress  HEENT:  Neck Supple.  No JVD   Head: Normocephalic.   Eyes: Extraocular movements intact  Cardiovascular: Normal rate, regular rhythm 1 out of 6 systolic murmur left sternal border Exam reveals no friction rub.    Pulmonary/Chest: Effort normal and breath sounds normal. No wheezes.  Abdominal: Soft and nontender. Bowel sounds are normal.   Musculoskeletal: No pedal edema  Neurological: Alert and oriented to person, place, and time.   Skin: Skin is warm and dry.   Psychiatric: Behavior is normal.            Objective   Lab Results   Component Value Date    CHOL 149 10/22/2024    CHOL 140 10/13/2023     Lab Results   Component Value Date    HDL  "80 10/22/2024    HDL 58 10/13/2023     Lab Results   Component Value Date    LDLCALC 48 10/22/2024    LDLCALC 60 10/13/2023     Lab Results   Component Value Date    TRIG 133 10/22/2024    TRIG 139 10/13/2023     Lab Results   Component Value Date    ALT 58 (H) 10/22/2024    AST 32 10/22/2024     Lab Results   Component Value Date    WBC 8.8 10/22/2024    HGB 12.7 (L) 10/22/2024    HCT 40.8 10/22/2024     10/22/2024     Lab Results   Component Value Date    GLUCOSE 84 10/22/2024     10/22/2024    K 3.9 10/22/2024    CO2 28 10/22/2024     10/22/2024    BUN 14 10/22/2024    CREATININE 0.83 10/22/2024     No results found for: \"HGBA1C\"  Lab Results   Component Value Date    TSH 0.50 08/31/2022     Lab Results   Component Value Date     (H) 10/22/2024     [unfilled]    Troponin I Results    No lab values to display.       No results found for: \"HSTROPONINI\"                                                       IMAGING     Monitor 3 days 2-2024  -Heart rate 53 to 129 bpm with average heart rate 72 bpm  -452 episodes of SVT with average heart rate 124 bpm  -No ventricular tachycardia no atrial fibrillation no high-grade AV block no pauses  -No patient event triggers        Transthoracic echocardiogram 10-   Left Ventricle: Normal ventricle size. Mild concentric left ventricular hypertrophy. Preserved systolic function. Estimated EF 55-60%. Unable to assess regional wall motion abnormalities. Grade II diastolic dysfunction.  •  Right Ventricle: Normal ventricle size. Normal systolic function.  •  Left Atrium: Mildly dilated atrium.  •  Right Atrium: Normal sized atrium.  •  Aortic Valve: Tricuspid valve.  Sclerotic leaflets. Trace regurgitation. No stenosis.  •  Mitral Valve: Sclerotic mitral valve. Normal leaflet motion. Mild mitral annular calcification. Mild regurgitation. No stenosis.  •  Tricuspid Valve: Normal structure. Mild regurgitation. Estimated RVSP = 50 mmHg.  •  Aorta: Aortic " root top normal. Ascending aorta normal-sized.  •  Pericardium: No evidence of pericardial effusion.         Cardiac catheterization 4-6-2023  Coronary Angiogram/Left Heart Catheterization   1.  60% focal stenosis of the mid LAD.  iFR negative, 0.97.  2.  Minimal luminal irregularities in the remaining coronary tree.     Right Heart Catheterization  1. Normal right and left heart filling pressures (RA 7, PA 34/16 (22), and PCW 12 mm Hg).  2. Cardiac output and index of 6.56 L/min and 2.7 L/min/m2 respectively.   3. SVR 1080 dynes  4. PVR 1.5 wood units    Trans thoracic echocardiogram [facility] 6-   Normal left ventricular size. Normal left ventricular wall thickness. Normal left   ventricular systolic function. LV Ejection Fraction was 55 %.     Findings:   Study Quality:   Technically Difficult. Intravenous contrast utilized to enhance endocardial visualization.   Left Ventricle:   Normal left ventricular size. Normal left ventricular wall thickness. Normal left   ventricular systolic function. LV Ejection Fraction was 55 %.   Diastolic Function:   Mitral valve inflow pattern and Tissue Doppler imaging within normal limits for age.   Right Ventricle:   Normal right ventricular systolic function. Normal right ventricular size. TAPSE 2.9 cm.   Left Atrium:   The left atrium is normal in size. Left atrial volume index 11 ml/m2.   Right Atrium:   The right atrium is normal in size. Right Atrial Volume Index 7 ml/m2.   Atrial Septum:   Not well visualized.   Mitral Valve:   Mild mitral annular calcification. Mitral valve leaflets appear mildly thickened. Trivial   mitral regurgitation.   Aortic Valve:   Trileaflet aortic valve. No aortic regurgitation. No hemodynamically significant aortic   stenosis.   Tricuspid Valve:   Normal appearance of the tricuspid valve. There is trivial tricuspid regurgitation. RV   systolic pressure cannot be determined due to lack of a tricuspid regurgitation Doppler   signal.    Pulmonic Valve:   Normal appearance of the pulmonic valve.   Pericardium:   Normal pericardium with no significant pericardial effusion.   Aorta:   Normal-sized aortic root and ascending aorta.   IVC:   Normal size IVC with respiratory collapse consistent with a right atrial pressure of 3   mmHg. IVC diameter 1.6 cm.        Coronary CTA 4-  1.  Technically difficult study.  Significant artifact affects interpretation.  Calcified plaque seen in the left main as well as the proximal left anterior  descending and circumflex.  The vessels appear most likely patent.  However,  severity of any stenosis could not be definitively determined due to significant  artifact.  Clinical correlation suggested.  Distal vessels not well-visualized.    LEFT MAIN: Moderate amount of circumferential calcified plaque seen.  There  appears to be a mild (30-50%) associated stenosis.  However, due to artifact,  the exact severity of stenosis could not be determined..  The vessel appears  most likely patent.     LAD:  Proximal: Diffuse calcified plaque seen.  Severity of stenosis could not be  determined due to artifact..  The vessel appears most likely patent.  Mid: Diffuse calcified plaque seen.  Severity of stenosis cannot be determined  due to artifact..  The vessel appears most likely patent.  Distal: Not well seen..  DIAG 1: Not well seen..  DIAG 2: Not well seen..     LCX:  Proximal: Diffuse calcified plaque seen.  The severity of stenosis could not be  determined due to artifact.  The vessel appears most likely patent.  Mid: Calcified plaque seen.  Severity of stenosis cannot be determined due to  artifact..  The vessel appears most likely patent.  Distal: Not well seen.  OM1:  Not well seen.  LPLB: Not well seen.  LPDA: Not well seen.     RCA:  Right coronary artery not well-visualized due to motion artifact..  No calcified  plaque seen.  Small nondominant vessel.     2.  Cardiac Structures: Normal.  3.  Normal left  ventricular systolic function.  4.  Coronary calcium score 837.  This places the patient in the DIAZ 85th risk  percentile for age and gender matched individuals.  5.  Overall quality of the scan was poor.         EKG 12/6/2024   Normal sinus rhythm 71bpm [id number]  Normal ECG  When compared with ECG of 18-OCT-2024 09:36,  No significant change was found                                            ASSESSMENT AND PLAN   Mr. Pappas is a 69-year-old gentleman with the following issues:    Assessment/Plan    ABNER (obstructive sleep apnea)  On CPAP.  Recommended following up with sleep medicine and number at [facility] given to him.  He says his previous pulmonologist has left the area.    Coronary artery disease involving native coronary artery of native heart without angina pectoris  CTA 4-2021 shows no high-grade lesions but visualization limited.  Coronary angiogram 2023 shows 60% LAD lesion which is IFR negative.    As patient on anticoagulation for atrial fibrillation not started on aspirin at this time    Continue with metoprolol and atorvastatin.    PAF (paroxysmal atrial fibrillation) (CMS/[facility])  Paroxysmal atrial fibrillation 6-2022 at [facility] when he had a urinary tract infection.  Apixaban 5 mg twice daily    Primary hypertension  metoprolol tartrate 25 mg twice daily-if blood pressure poorly controlled may consider adding ACE inhibitor or ARB    Lower extremity edema  Secondary to rheumatologic issues and gout.  Currently no significant edema.  On furosemide 20 mg 3 times weekly    Hyperlipidemia  Atorvastatin 40 mg p.o. daily.  Blood work  LDL 48 HDL 80 triglycerides 133 ALT 58 AST 32              Return in about 3 months (around 3/6/2025).           Thank you for allowing me to participate in the care of this patient.  I hope this information is helpful.         Ajay Bhat MD [facility] JACQUI  12/6/2024  1:02 PM    This document was generated utilizing voice recognition technology. A  reasonable attempt at proofreading has been made to minimize errors but please excuse any typographical errors which may be present. Please call with any questions.

## 2024-12-06 NOTE — PROGRESS NOTES
Cardiology Outpatient Note    [facility] Office  [address] [place] [address]     [facility]  [facility] [address]  [address] [place] [address]     TEL  [phone]  [facility]     Clint Pappas is a 69 y.o. male patient here for follow-up.  Patient sees Dr. Masters for rheumatology    Patient has a history of rheumatoid arthritis, hypertension, obstructive sleep apnea, BPH, who was admitted to [facility] 6-2022 with a urinary tract infection acute renal insufficiency.  However for him his main issue is lower extremity/ankle edema.  He did not have a DVT by ultrasound and had an echocardiogram at that time which showed normal left ventricular function.  He had paroxysmal atrial fibrillation at that time and was loaded with digoxin, reverted to sinus rhythm, digoxin discontinued, and started on Pradaxa.  Patient states the cost is increasing and discussed other options for anticoagulation.  He is currently on apixaban.  He said that his insurance company offered different and cheaper alternatives left the visit called back and the medications that he gave such as clopidogrel Jantoven and prasugrel are not equivalence of his apixaban.    Patient has been evaluated for shortness of breath and had cardiac catheterization which showed nonobstructive coronary artery disease and normal wedge pressure.  He lower extremity edema swelling which was ultimately thought to be related to his rheumatologic issues.    Patient has severe sleep apnea and received a new CPAP machine-he has postnasal drip and thinks this may be related to his CPAP machine.  He was given the number for sleep medicine here at [facility] if he wishes to make an appointment.  His previous doctor he believes has left the area.    He was seen at [facility] in the emergency room 12-3-2024 and was felt to have a viral prodrome resulting in  nasal congestion and shortness of breath.  Troponin values unremarkable, EKG was unremarkable.  Chest x-ray showed no pneumonia.  Patient given steroids albuterol and azithromycin and says that he feels better.                                                               PMH     Medical History:  Past Medical History:   Diagnosis Date    Arthritis     Atrial fibrillation (CMS/HCC)     Edema     Enlarged prostate     Gastroesophageal reflux disease without esophagitis     Hypertension     Lipid disorder     ABNER (obstructive sleep apnea) 05/15/2024    SOB (shortness of breath)     may be infection in lung       Surgical History:  Past Surgical History   Procedure Laterality Date    Carpal tunnel release Bilateral     Colectomy      Eye surgery      iFR - initial vessel N/A 2023    Performed by Werner Cowart [place] [facility] [place]    Nose surgery      RIGHT & LEFT HEART CATH WITH CORONARY ANGIOGRAPHY N/A 2023    Performed by Werner Cowart [place] at [facility] [place] [facility] [place]       Social History:  Social History     Tobacco Use    Smoking status: Former     Current packs/day: 0.00     Types: Cigarettes     Start date:      Quit date:      Years since quittin.9    Smokeless tobacco: Never   Vaping Use    Vaping status: Never Used   Substance Use Topics    Alcohol use: Not Currently    Drug use: Never       Family History: He indicated that his biological mother is . He indicated that his biological father is . He indicated that the status of his biological brother is unknown. He indicated that his maternal grandmother is . He indicated that his maternal grandfather is .      Allergies:Acetaminophen, Lisinopril, Oxycodone, and Oxycodone-acetaminophen    Current Medications:    Outpatient Encounter Medications as of 2024:     albuterol HFA 90 mcg/actuation inhaler, Inhale 2 puffs Every 4 hours as needed.    apixaban (ELIQUIS) 5 mg tablet, Take 1 tablet (5 mg  total) by mouth 2 (two) times a day.    atorvastatin (LIPITOR) 40 mg tablet, Take 40 mg by mouth nightly.    azithromycin (ZITHROMAX) 250 mg tablet, Take 250 mg by mouth daily.    fluticasone propionate (FLONASE) 50 mcg/actuation nasal spray, Administer 2 sprays into each nostril as needed.    folic acid (FOLVITE) 1 mg tablet, Take 1 mg by mouth daily.    furosemide (LASIX) 20 mg tablet, Take 1 tablet (20 mg total) by mouth 3 (three) times a week (Mon, Wed, Fri).    hydrOXYchloroQUINE (PLAQUENIL) 200 mg tablet, Take 400 mg by mouth daily.    inFLIXimab (REMICADE) 100 mg injection, Infuse into a venous catheter.    ipratropium (ATROVENT) 42 mcg (0.06 %) nasal spray, Administer 2 sprays into each nostril as needed.    METHOTREXATE SODIUM ORAL, Inject into the shoulder, thigh, or buttocks once a week.    metoprolol tartrate (LOPRESSOR) 25 mg tablet, Take 1 tablet (25 mg total) by mouth 2 (two) times a day.    oxybutynin XL (DITROPAN XL) 15 mg 24 hr tablet, TAKE ONE TABLET BY MOUTH IN THE MORNING AS DIRECTED    pantoprazole (PROTONIX) 40 mg EC tablet, Take 40 mg by mouth as needed.    predniSONE (DELTASONE) 20 mg tablet,     sildenafiL (VIAGRA) 100 mg tablet, Take 100 mg by mouth as needed.    solifenacin (VESICARE) 10 mg tablet, Take 10 mg by mouth every other day.    tamsulosin (FLOMAX) 0.4 mg capsule, Take 0.4 mg by mouth daily.    testosterone cypionate (DEPO-TESTOTERONE) 200 mg/mL injection, every 28 (twentyeight) days.    [DISCONTINUED] furosemide (LASIX) 20 mg tablet, Take 1 tablet (20 mg total) by mouth daily.                                                          OBJECTIVE   ROS as in HPI   Constitution: Negative for chills and fever.    Eyes: Negative for blurred vision and visual disturbance.   Cardiovascular: Negative for chest pain,  near-syncope, palpitations and syncope.   Respiratory: Negative for hemoptysis  positive postnasal drip  Hematologic/Lymphatic: No abnormal bleeding  Skin: Negative for rash.  No new skin changes  Gastrointestinal: Negative for abdominal pain, diarrhea, hematochezia, melena, nausea and vomiting.   Genitourinary: Negative for dysuria and hematuria.   Neurological: Negative for headaches.              Vitals:    12/06/24 1056   BP: (!) 140/84   BP Location: Left upper arm   Patient Position: Sitting   Pulse: 71   Resp: 16   Weight: (!) 145 kg (320 lb)   Height: 1.829 m (6')           BP Readings from Last 3 Encounters:   12/06/24 (!) 140/84   10/18/24 120/72   05/15/24 110/72     Wt Readings from Last 3 Encounters:   12/06/24 (!) 145 kg (320 lb)   10/18/24 (!) 144 kg (318 lb)   05/15/24 (!) 142 kg (313 lb)       Physical Exam   Constitutional: Appears comfortable in no distress  HEENT:  Neck Supple.  No JVD   Head: Normocephalic.   Eyes: Extraocular movements intact  Cardiovascular: Normal rate, regular rhythm 1 out of 6 systolic murmur left sternal border Exam reveals no friction rub.    Pulmonary/Chest: Effort normal and breath sounds normal. No wheezes.  Abdominal: Soft and nontender. Bowel sounds are normal.   Musculoskeletal: No pedal edema  Neurological: Alert and oriented to person, place, and time.   Skin: Skin is warm and dry.   Psychiatric: Behavior is normal.            Objective   Lab Results   Component Value Date    CHOL 149 10/22/2024    CHOL 140 10/13/2023     Lab Results   Component Value Date    HDL 80 10/22/2024    HDL 58 10/13/2023     Lab Results   Component Value Date    LDLCALC 48 10/22/2024    LDLCALC 60 10/13/2023     Lab Results   Component Value Date    TRIG 133 10/22/2024    TRIG 139 10/13/2023     Lab Results   Component Value Date    ALT 58 (H) 10/22/2024    AST 32 10/22/2024     Lab Results   Component Value Date    WBC 8.8 10/22/2024    HGB 12.7 (L) 10/22/2024    HCT 40.8 10/22/2024     10/22/2024     Lab Results   Component Value Date    GLUCOSE 84 10/22/2024     10/22/2024    K 3.9 10/22/2024    CO2 28 10/22/2024     10/22/2024    BUN 14  "10/22/2024    CREATININE 0.83 10/22/2024     No results found for: \"HGBA1C\"  Lab Results   Component Value Date    TSH 0.50 08/31/2022     Lab Results   Component Value Date     (H) 10/22/2024     [unfilled]    Troponin I Results    No lab values to display.       No results found for: \"HSTROPONINI\"                                                       IMAGING     Monitor 3 days 2-2024  -Heart rate 53 to 129 bpm with average heart rate 72 bpm  -452 episodes of SVT with average heart rate 124 bpm  -No ventricular tachycardia no atrial fibrillation no high-grade AV block no pauses  -No patient event triggers        Transthoracic echocardiogram 10-   Left Ventricle: Normal ventricle size. Mild concentric left ventricular hypertrophy. Preserved systolic function. Estimated EF 55-60%. Unable to assess regional wall motion abnormalities. Grade II diastolic dysfunction.    Right Ventricle: Normal ventricle size. Normal systolic function.    Left Atrium: Mildly dilated atrium.    Right Atrium: Normal sized atrium.    Aortic Valve: Tricuspid valve.  Sclerotic leaflets. Trace regurgitation. No stenosis.    Mitral Valve: Sclerotic mitral valve. Normal leaflet motion. Mild mitral annular calcification. Mild regurgitation. No stenosis.    Tricuspid Valve: Normal structure. Mild regurgitation. Estimated RVSP = 50 mmHg.    Aorta: Aortic root top normal. Ascending aorta normal-sized.    Pericardium: No evidence of pericardial effusion.         Cardiac catheterization 4-6-2023  Coronary Angiogram/Left Heart Catheterization   1.  60% focal stenosis of the mid LAD.  iFR negative, 0.97.  2.  Minimal luminal irregularities in the remaining coronary tree.     Right Heart Catheterization  1. Normal right and left heart filling pressures (RA 7, PA 34/16 (22), and PCW 12 mm Hg).  2. Cardiac output and index of 6.56 L/min and 2.7 L/min/m2 respectively.   3. SVR 1080 dynes  4. PVR 1.5 wood units    Trans thoracic echocardiogram " [facility] 6-   Normal left ventricular size. Normal left ventricular wall thickness. Normal left   ventricular systolic function. LV Ejection Fraction was 55 %.     Findings:   Study Quality:   Technically Difficult. Intravenous contrast utilized to enhance endocardial visualization.   Left Ventricle:   Normal left ventricular size. Normal left ventricular wall thickness. Normal left   ventricular systolic function. LV Ejection Fraction was 55 %.   Diastolic Function:   Mitral valve inflow pattern and Tissue Doppler imaging within normal limits for age.   Right Ventricle:   Normal right ventricular systolic function. Normal right ventricular size. TAPSE 2.9 cm.   Left Atrium:   The left atrium is normal in size. Left atrial volume index 11 ml/m2.   Right Atrium:   The right atrium is normal in size. Right Atrial Volume Index 7 ml/m2.   Atrial Septum:   Not well visualized.   Mitral Valve:   Mild mitral annular calcification. Mitral valve leaflets appear mildly thickened. Trivial   mitral regurgitation.   Aortic Valve:   Trileaflet aortic valve. No aortic regurgitation. No hemodynamically significant aortic   stenosis.   Tricuspid Valve:   Normal appearance of the tricuspid valve. There is trivial tricuspid regurgitation. RV   systolic pressure cannot be determined due to lack of a tricuspid regurgitation Doppler   signal.   Pulmonic Valve:   Normal appearance of the pulmonic valve.   Pericardium:   Normal pericardium with no significant pericardial effusion.   Aorta:   Normal-sized aortic root and ascending aorta.   IVC:   Normal size IVC with respiratory collapse consistent with a right atrial pressure of 3   mmHg. IVC diameter 1.6 cm.        Coronary CTA 4-  1.  Technically difficult study.  Significant artifact affects interpretation.  Calcified plaque seen in the left main as well as the proximal left anterior  descending and circumflex.  The vessels appear most likely patent.   However,  severity of any stenosis could not be definitively determined due to significant  artifact.  Clinical correlation suggested.  Distal vessels not well-visualized.    LEFT MAIN: Moderate amount of circumferential calcified plaque seen.  There  appears to be a mild (30-50%) associated stenosis.  However, due to artifact,  the exact severity of stenosis could not be determined..  The vessel appears  most likely patent.     LAD:  Proximal: Diffuse calcified plaque seen.  Severity of stenosis could not be  determined due to artifact..  The vessel appears most likely patent.  Mid: Diffuse calcified plaque seen.  Severity of stenosis cannot be determined  due to artifact..  The vessel appears most likely patent.  Distal: Not well seen..  DIAG 1: Not well seen..  DIAG 2: Not well seen..     LCX:  Proximal: Diffuse calcified plaque seen.  The severity of stenosis could not be  determined due to artifact.  The vessel appears most likely patent.  Mid: Calcified plaque seen.  Severity of stenosis cannot be determined due to  artifact..  The vessel appears most likely patent.  Distal: Not well seen.  OM1:  Not well seen.  LPLB: Not well seen.  LPDA: Not well seen.     RCA:  Right coronary artery not well-visualized due to motion artifact..  No calcified  plaque seen.  Small nondominant vessel.     2.  Cardiac Structures: Normal.  3.  Normal left ventricular systolic function.  4.  Coronary calcium score 837.  This places the patient in the DIAZ 85th risk  percentile for age and gender matched individuals.  5.  Overall quality of the scan was poor.         EKG 12/6/2024   Normal sinus rhythm 71bpm [id number]  Normal ECG  When compared with ECG of 18-OCT-2024 09:36,  No significant change was found                                            ASSESSMENT AND PLAN   Mr. Pappas is a 69-year-old gentleman with the following issues:    Assessment/Plan    ABNER (obstructive sleep apnea)  On CPAP.  Recommended following up with sleep  medicine and number at [facility] health given to him.  He says his previous pulmonologist has left the area.    Coronary artery disease involving native coronary artery of native heart without angina pectoris  CTA 4-2021 shows no high-grade lesions but visualization limited.  Coronary angiogram 2023 shows 60% LAD lesion which is IFR negative.    As patient on anticoagulation for atrial fibrillation not started on aspirin at this time    Continue with metoprolol and atorvastatin.    PAF (paroxysmal atrial fibrillation) (CMS/[facility])  Paroxysmal atrial fibrillation 6-2022 at [facility] when he had a urinary tract infection.  Apixaban 5 mg twice daily    Primary hypertension  metoprolol tartrate 25 mg twice daily-if blood pressure poorly controlled may consider adding ACE inhibitor or ARB    Lower extremity edema  Secondary to rheumatologic issues and gout.  Currently no significant edema.  On furosemide 20 mg 3 times weekly    Hyperlipidemia  Atorvastatin 40 mg p.o. daily.  Blood work  LDL 48 HDL 80 triglycerides 133 ALT 58 AST 32              Return in about 3 months (around 3/6/2025).           Thank you for allowing me to participate in the care of this patient.  I hope this information is helpful.         Ajay Bhat MD [facility]  12/6/2024  1:02 PM    This document was generated utilizing voice recognition technology. A reasonable attempt at proofreading has been made to minimize errors but please excuse any typographical errors which may be present. Please call with any questions.

## 2024-12-06 NOTE — ASSESSMENT & PLAN NOTE
Paroxysmal atrial fibrillation 6-2022 at [facility] when he had a urinary tract infection.  Apixaban 5 mg twice daily

## 2024-12-06 NOTE — TELEPHONE ENCOUNTER
----- Message from Ajay Bhat sent at 12/6/2024  1:04 PM EST -----  Can you please call this patient.  He is on apixaban 5 mg twice daily.  His pharmacy told him his insurance covercilastazol, clopidogrel jantoven and prasugrel-but they are not equivalence of apixaban so he should continue with apixaban.  If he wants to he can come back to the office and we can hold 2 boxes of samples for him.  Thank you

## 2024-12-06 NOTE — ASSESSMENT & PLAN NOTE
Secondary to rheumatologic issues and gout.  Currently no significant edema.  On furosemide 20 mg 3 times weekly

## 2024-12-06 NOTE — Clinical Note
Can you please call this patient.  He is on apixaban 5 mg twice daily.  His pharmacy told him his insurance covercilastazol, clopidogrel jantoven and prasugrel-but they are not equivalence of apixaban so he should continue with apixaban.  If he wants to he can come back to the office and we can hold 2 boxes of samples for him.  Thank you

## 2025-03-26 ENCOUNTER — DOCTOR'S OFFICE (OUTPATIENT)
Facility: LOCATION | Age: 70
Setting detail: OPHTHALMOLOGY
End: 2025-03-26
Payer: MEDICARE

## 2025-03-26 DIAGNOSIS — Z79.899: ICD-10-CM

## 2025-03-26 PROCEDURE — 92083 EXTENDED VISUAL FIELD XM: CPT | Performed by: OPHTHALMOLOGY

## 2025-03-26 PROCEDURE — 92134 CPTRZ OPH DX IMG PST SGM RTA: CPT | Performed by: OPHTHALMOLOGY

## 2025-03-26 PROCEDURE — 99213 OFFICE O/P EST LOW 20 MIN: CPT | Performed by: OPHTHALMOLOGY

## 2025-03-26 ASSESSMENT — CONFRONTATIONAL VISUAL FIELD TEST (CVF)
OD_FINDINGS: FULL
OS_FINDINGS: FULL

## 2025-03-26 ASSESSMENT — VISUAL ACUITY
OS_BCVA: 20/25-2
OD_BCVA: 20/30

## 2025-04-02 ENCOUNTER — DOCTOR'S OFFICE (OUTPATIENT)
Facility: LOCATION | Age: 70
Setting detail: OPHTHALMOLOGY
End: 2025-04-02
Payer: MEDICARE

## 2025-04-02 DIAGNOSIS — Z79.899: ICD-10-CM

## 2025-04-02 DIAGNOSIS — M06.9: ICD-10-CM

## 2025-04-02 PROBLEM — Z96.1 PRESENCE OF INTRAOCULAR LENS ; BOTH EYES: Status: ACTIVE | Noted: 2025-03-26

## 2025-04-02 PROCEDURE — 99213 OFFICE O/P EST LOW 20 MIN: CPT | Performed by: OPHTHALMOLOGY

## 2025-04-02 PROCEDURE — 92250 FUNDUS PHOTOGRAPHY W/I&R: CPT | Performed by: OPHTHALMOLOGY

## 2025-04-02 ASSESSMENT — VISUAL ACUITY
OD_BCVA: 20/20-2
OS_BCVA: 20/20

## 2025-04-22 ENCOUNTER — APPOINTMENT (OUTPATIENT)
Dept: URBAN - METROPOLITAN AREA CLINIC 362 | Facility: CLINIC | Age: 70
Setting detail: DERMATOLOGY
End: 2025-04-22

## 2025-04-22 DIAGNOSIS — B86 SCABIES: ICD-10-CM

## 2025-04-22 PROCEDURE — ? PRESCRIPTION

## 2025-04-22 PROCEDURE — ? PRESCRIPTION MEDICATION MANAGEMENT

## 2025-04-22 PROCEDURE — ? COUNSELING

## 2025-04-22 PROCEDURE — 99213 OFFICE O/P EST LOW 20 MIN: CPT

## 2025-04-22 PROCEDURE — ? MEDICATION COUNSELING

## 2025-04-22 RX ORDER — PERMETHRIN 50 MG/G
CREAM TOPICAL
Qty: 60 | Refills: 1 | Status: ERX | COMMUNITY
Start: 2025-04-22

## 2025-04-22 RX ORDER — PREDNISONE 10 MG/1
TABLET ORAL
Qty: 33 | Refills: 0 | Status: ERX | COMMUNITY
Start: 2025-04-22

## 2025-04-22 RX ORDER — TRIAMCINOLONE ACETONIDE 1 MG/G
CREAM TOPICAL
Qty: 454 | Refills: 1 | Status: ERX | COMMUNITY
Start: 2025-04-22

## 2025-04-22 RX ADMIN — TRIAMCINOLONE ACETONIDE: 1 CREAM TOPICAL at 00:00

## 2025-04-22 RX ADMIN — PREDNISONE: 10 TABLET ORAL at 00:00

## 2025-04-22 RX ADMIN — PERMETHRIN: 50 CREAM TOPICAL at 00:00

## 2025-04-22 ASSESSMENT — LOCATION DETAILED DESCRIPTION DERM
LOCATION DETAILED: PERIUMBILICAL SKIN
LOCATION DETAILED: RIGHT PROXIMAL DORSAL FOREARM
LOCATION DETAILED: LEFT PROXIMAL DORSAL FOREARM
LOCATION DETAILED: LEFT ANTERIOR PROXIMAL THIGH
LOCATION DETAILED: RIGHT ANTERIOR DISTAL THIGH
LOCATION DETAILED: SUPERIOR THORACIC SPINE

## 2025-04-22 ASSESSMENT — LOCATION SIMPLE DESCRIPTION DERM
LOCATION SIMPLE: LEFT FOREARM
LOCATION SIMPLE: LEFT THIGH
LOCATION SIMPLE: RIGHT THIGH
LOCATION SIMPLE: ABDOMEN
LOCATION SIMPLE: UPPER BACK
LOCATION SIMPLE: RIGHT FOREARM

## 2025-04-22 ASSESSMENT — LOCATION ZONE DERM
LOCATION ZONE: LEG
LOCATION ZONE: TRUNK
LOCATION ZONE: ARM

## 2025-04-22 NOTE — PROCEDURE: MIPS QUALITY
Quality 130: Documentation Of Current Medications In The Medical Record: Current Medications Documented
Quality 431: Preventive Care And Screening: Unhealthy Alcohol Use - Screening: Patient identified as an unhealthy alcohol user when screened for unhealthy alcohol use using a systematic screening method and received brief counseling
Quality 47: Advance Care Plan: Advance Care Planning discussed and documented in the medical record; patient did not wish or was not able to name a surrogate decision maker or provide an advance care plan.
Quality 226: Preventive Care And Screening: Tobacco Use: Screening And Cessation Intervention: Patient screened for tobacco use and is an ex/non-smoker
Detail Level: Generalized

## 2025-04-22 NOTE — PROCEDURE: PRESCRIPTION MEDICATION MANAGEMENT
Initiate Treatment: Permethrin cream, apply from neck down to toes, leave on overnight (x 8-12 hours) then rinse off following morning. Repeat in 1 week. \\n\\nTAC 0.1% cream to body rash BID PRN itch\\n\\nprednisone 10 mg tablet: Take 4 tablets po qday x 3 days, then Take 3 tablets po qday x 3 days, then Take 2 tablets po qday x 3 days, Take 1 tablets po qday x 4 days, Take 1/2 tablet po qday x 4 days

## 2025-04-22 NOTE — PROCEDURE: MEDICATION COUNSELING
Griseofulvin Pregnancy And Lactation Text: This medication is Pregnancy Category X and is known to cause serious birth defects. It is unknown if this medication is excreted in breast milk but breast feeding should be avoided.
Acitretin Pregnancy And Lactation Text: This medication is Pregnancy Category X and should not be given to women who are pregnant or may become pregnant in the future. This medication is excreted in breast milk.
Benzoyl Peroxide Counseling: Patient counseled that medicine may cause skin irritation and bleach clothing.  In the event of skin irritation, the patient was advised to reduce the amount of the drug applied or use it less frequently.   The patient verbalized understanding of the proper use and possible adverse effects of benzoyl peroxide.  All of the patient's questions and concerns were addressed.
Protopic Counseling: Patient may experience a mild burning sensation during topical application. Protopic is not approved in children less than 2 years of age. There have been case reports of hematologic and skin malignancies in patients using topical calcineurin inhibitors although causality is questionable.
Minocycline Counseling: Patient advised regarding possible photosensitivity and discoloration of the teeth, skin, lips, tongue and gums.  Patient instructed to avoid sunlight, if possible.  When exposed to sunlight, patients should wear protective clothing, sunglasses, and sunscreen.  The patient was instructed to call the office immediately if the following severe adverse effects occur:  hearing changes, easy bruising/bleeding, severe headache, or vision changes.  The patient verbalized understanding of the proper use and possible adverse effects of minocycline.  All of the patient's questions and concerns were addressed.
Rituxan Pregnancy And Lactation Text: This medication is Pregnancy Category C and it isn't know if it is safe during pregnancy. It is unknown if this medication is excreted in breast milk but similar antibodies are known to be excreted.
5-Fu Counseling: 5-Fluorouracil Counseling:  I discussed with the patient the risks of 5-fluorouracil including but not limited to erythema, scaling, itching, weeping, crusting, and pain.
Stelara Counseling:  I discussed with the patient the risks of ustekinumab including but not limited to immunosuppression, malignancy, posterior leukoencephalopathy syndrome, and serious infections.  The patient understands that monitoring is required including a PPD at baseline and must alert us or the primary physician if symptoms of infection or other concerning signs are noted.
Azathioprine Counseling:  I discussed with the patient the risks of azathioprine including but not limited to myelosuppression, immunosuppression, hepatotoxicity, lymphoma, and infections.  The patient understands that monitoring is required including baseline LFTs, Creatinine, possible TPMP genotyping and weekly CBCs for the first month and then every 2 weeks thereafter.  The patient verbalized understanding of the proper use and possible adverse effects of azathioprine.  All of the patient's questions and concerns were addressed.
Topical Clindamycin Pregnancy And Lactation Text: This medication is Pregnancy Category B and is considered safe during pregnancy. It is unknown if it is excreted in breast milk.
Nsaids Pregnancy And Lactation Text: These medications are considered safe up to 30 weeks gestation. It is excreted in breast milk.
Griseofulvin Counseling:  I discussed with the patient the risks of griseofulvin including but not limited to photosensitivity, cytopenia, liver damage, nausea/vomiting and severe allergy.  The patient understands that this medication is best absorbed when taken with a fatty meal (e.g., ice cream or french fries).
Erythromycin Pregnancy And Lactation Text: This medication is Pregnancy Category B and is considered safe during pregnancy. It is also excreted in breast milk.
Colchicine Pregnancy And Lactation Text: This medication is Pregnancy Category C and isn't considered safe during pregnancy. It is excreted in breast milk.
Sski Pregnancy And Lactation Text: This medication is Pregnancy Category D and isn't considered safe during pregnancy. It is excreted in breast milk.
Fluconazole Counseling:  Patient counseled regarding adverse effects of fluconazole including but not limited to headache, diarrhea, nausea, upset stomach, liver function test abnormalities, taste disturbance, and stomach pain.  There is a rare possibility of liver failure that can occur when taking fluconazole.  The patient understands that monitoring of LFTs and kidney function test may be required, especially at baseline. The patient verbalized understanding of the proper use and possible adverse effects of fluconazole.  All of the patient's questions and concerns were addressed.
Solaraze Counseling:  I discussed with the patient the risks of Solaraze including but not limited to erythema, scaling, itching, weeping, crusting, and pain.
Infliximab Pregnancy And Lactation Text: This medication is Pregnancy Category B and is considered safe during pregnancy. It is unknown if this medication is excreted in breast milk.
Cosentyx Counseling:  I discussed with the patient the risks of Cosentyx including but not limited to worsening of Crohn's disease, immunosuppression, allergic reactions and infections.  The patient understands that monitoring is required including a PPD at baseline and must alert us or the primary physician if symptoms of infection or other concerning signs are noted.
Topical Clindamycin Counseling: Patient counseled that this medication may cause skin irritation or allergic reactions.  In the event of skin irritation, the patient was advised to reduce the amount of the drug applied or use it less frequently.   The patient verbalized understanding of the proper use and possible adverse effects of clindamycin.  All of the patient's questions and concerns were addressed.
Clofazimine Counseling:  I discussed with the patient the risks of clofazimine including but not limited to skin and eye pigmentation, liver damage, nausea/vomiting, gastrointestinal bleeding and allergy.
Enbrel Counseling:  I discussed with the patient the risks of etanercept including but not limited to myelosuppression, immunosuppression, autoimmune hepatitis, demyelinating diseases, lymphoma, and infections.  The patient understands that monitoring is required including a PPD at baseline and must alert us or the primary physician if symptoms of infection or other concerning signs are noted.
Arava Counseling:  Patient counseled regarding adverse effects of Arava including but not limited to nausea, vomiting, abnormalities in liver function tests. Patients may develop mouth sores, rash, diarrhea, and abnormalities in blood counts. The patient understands that monitoring is required including LFTs and blood counts.  There is a rare possibility of scarring of the liver and lung problems that can occur when taking methotrexate. Persistent nausea, loss of appetite, pale stools, dark urine, cough, and shortness of breath should be reported immediately. Patient advised to discontinue Arava treatment and consult with a physician prior to attempting conception. The patient will have to undergo a treatment to eliminate Arava from the body prior to conception.
Cyclosporine Counseling:  I discussed with the patient the risks of cyclosporine including but not limited to hypertension, gingival hyperplasia,myelosuppression, immunosuppression, liver damage, kidney damage, neurotoxicity, lymphoma, and serious infections. The patient understands that monitoring is required including baseline blood pressure, CBC, CMP, lipid panel and uric acid, and then 1-2 times monthly CMP and blood pressure.
Cephalexin Counseling: I counseled the patient regarding use of cephalexin as an antibiotic for prophylactic and/or therapeutic purposes. Cephalexin (commonly prescribed under brand name Keflex) is a cephalosporin antibiotic which is active against numerous classes of bacteria, including most skin bacteria. Side effects may include nausea, diarrhea, gastrointestinal upset, rash, hives, yeast infections, and in rare cases, hepatitis, kidney disease, seizures, fever, confusion, neurologic symptoms, and others. Patients with severe allergies to penicillin medications are cautioned that there is about a 10% incidence of cross-reactivity with cephalosporins. When possible, patients with penicillin allergies should use alternatives to cephalosporins for antibiotic therapy.
Ivermectin Pregnancy And Lactation Text: This medication is Pregnancy Category C and it isn't known if it is safe during pregnancy. It is also excreted in breast milk.
Xeljanz Counseling: I discussed with the patient the risks of Xeljanz therapy including increased risk of infection, liver issues, headache, diarrhea, or cold symptoms. Live vaccines should be avoided. They were instructed to call if they have any problems.
Quinolones Pregnancy And Lactation Text: This medication is Pregnancy Category C and it isn't know if it is safe during pregnancy. It is also excreted in breast milk.
Ivermectin Counseling:  Patient instructed to take medication on an empty stomach with a full glass of water.  Patient informed of potential adverse effects including but not limited to nausea, diarrhea, dizziness, itching, and swelling of the extremities or lymph nodes.  The patient verbalized understanding of the proper use and possible adverse effects of ivermectin.  All of the patient's questions and concerns were addressed.
Cimetidine Pregnancy And Lactation Text: This medication is Pregnancy Category B and is considered safe during pregnancy. It is also excreted in breast milk and breast feeding isn't recommended.
Detail Level: Simple
Nsaids Counseling: NSAID Counseling: I discussed with the patient that NSAIDs should be taken with food. Prolonged use of NSAIDs can result in the development of stomach ulcers.  Patient advised to stop taking NSAIDs if abdominal pain occurs.  The patient verbalized understanding of the proper use and possible adverse effects of NSAIDs.  All of the patient's questions and concerns were addressed.
Topical Retinoid counseling:  Patient advised to apply a pea-sized amount only at bedtime and wait 30 minutes after washing their face before applying.  If too drying, patient may add a non-comedogenic moisturizer. The patient verbalized understanding of the proper use and possible adverse effects of retinoids.  All of the patient's questions and concerns were addressed.
Cellcept Counseling:  I discussed with the patient the risks of mycophenolate mofetil including but not limited to infection/immunosuppression, GI upset, hypokalemia, hypercholesterolemia, bone marrow suppression, lymphoproliferative disorders, malignancy, GI ulceration/bleed/perforation, colitis, interstitial lung disease, kidney failure, progressive multifocal leukoencephalopathy, and birth defects.  The patient understands that monitoring is required including a baseline creatinine and regular CBC testing. In addition, patient must alert us immediately if symptoms of infection or other concerning signs are noted.
Methotrexate Counseling:  Patient counseled regarding adverse effects of methotrexate including but not limited to nausea, vomiting, abnormalities in liver function tests. Patients may develop mouth sores, rash, diarrhea, and abnormalities in blood counts. The patient understands that monitoring is required including LFT's and blood counts.  There is a rare possibility of scarring of the liver and lung problems that can occur when taking methotrexate. Persistent nausea, loss of appetite, pale stools, dark urine, cough, and shortness of breath should be reported immediately. Patient advised to discontinue methotrexate treatment at least three months before attempting to become pregnant.  I discussed the need for folate supplements while taking methotrexate.  These supplements can decrease side effects during methotrexate treatment. The patient verbalized understanding of the proper use and possible adverse effects of methotrexate.  All of the patient's questions and concerns were addressed.
Hydroquinone Counseling:  Patient advised that medication may result in skin irritation, lightening (hypopigmentation), dryness, and burning.  In the event of skin irritation, the patient was advised to reduce the amount of the drug applied or use it less frequently.  Rarely, spots that are treated with hydroquinone can become darker (pseudoochronosis).  Should this occur, patient instructed to stop medication and call the office. The patient verbalized understanding of the proper use and possible adverse effects of hydroquinone.  All of the patient's questions and concerns were addressed.
Simponi Pregnancy And Lactation Text: The risk during pregnancy and breastfeeding is uncertain with this medication.
Spironolactone Pregnancy And Lactation Text: This medication can cause feminization of the male fetus and should be avoided during pregnancy. The active metabolite is also found in breast milk.
Tazorac Counseling:  Patient advised that medication is irritating and drying.  Patient may need to apply sparingly and wash off after an hour before eventually leaving it on overnight.  The patient verbalized understanding of the proper use and possible adverse effects of tazorac.  All of the patient's questions and concerns were addressed.
Glycopyrrolate Counseling:  I discussed with the patient the risks of glycopyrrolate including but not limited to skin rash, drowsiness, dry mouth, difficulty urinating, and blurred vision.
Thalidomide Pregnancy And Lactation Text: This medication is Pregnancy Category X and is absolutely contraindicated during pregnancy. It is unknown if it is excreted in breast milk.
Hydroquinone Pregnancy And Lactation Text: This medication has not been assigned a Pregnancy Risk Category but animal studies failed to show danger with the topical medication. It is unknown if the medication is excreted in breast milk.
High Dose Vitamin A Pregnancy And Lactation Text: High dose vitamin A therapy is contraindicated during pregnancy and breast feeding.
Topical Sulfur Applications Pregnancy And Lactation Text: This medication is Pregnancy Category C and has an unknown safety profile during pregnancy. It is unknown if this topical medication is excreted in breast milk.
Thalidomide Counseling: I discussed with the patient the risks of thalidomide including but not limited to birth defects, anxiety, weakness, chest pain, dizziness, cough and severe allergy.
Albendazole Counseling:  I discussed with the patient the risks of albendazole including but not limited to cytopenia, kidney damage, nausea/vomiting and severe allergy.  The patient understands that this medication is being used in an off-label manner.
Isotretinoin Counseling: Patient should get monthly blood tests, not donate blood, not drive at night if vision affected, not share medication, and not undergo elective surgery for 6 months after tx completed. Side effects reviewed, pt to contact office should one occur.
Hydroxychloroquine Counseling:  I discussed with the patient that a baseline ophthalmologic exam is needed at the start of therapy and every year thereafter while on therapy. A CBC may also be warranted for monitoring.  The side effects of this medication were discussed with the patient, including but not limited to agranulocytosis, aplastic anemia, seizures, rashes, retinopathy, and liver toxicity. Patient instructed to call the office should any adverse effect occur.  The patient verbalized understanding of the proper use and possible adverse effects of Plaquenil.  All the patient's questions and concerns were addressed.
Drysol Counseling:  I discussed with the patient the risks of drysol/aluminum chloride including but not limited to skin rash, itching, irritation, burning.
Terbinafine Counseling: Patient counseling regarding adverse effects of terbinafine including but not limited to headache, diarrhea, rash, upset stomach, liver function test abnormalities, itching, taste/smell disturbance, nausea, abdominal pain, and flatulence.  There is a rare possibility of liver failure that can occur when taking terbinafine.  The patient understands that a baseline LFT and kidney function test may be required. The patient verbalized understanding of the proper use and possible adverse effects of terbinafine.  All of the patient's questions and concerns were addressed.
Valtrex Counseling: I discussed with the patient the risks of valacyclovir including but not limited to kidney damage, nausea, vomiting and severe allergy.  The patient understands that if the infection seems to be worsening or is not improving, they are to call.
Carac Pregnancy And Lactation Text: This medication is Pregnancy Category X and contraindicated in pregnancy and in women who may become pregnant. It is unknown if this medication is excreted in breast milk.
Dupixent Pregnancy And Lactation Text: This medication likely crosses the placenta but the risk for the fetus is uncertain. This medication is excreted in breast milk.
Methotrexate Pregnancy And Lactation Text: This medication is Pregnancy Category X and is known to cause fetal harm. This medication is excreted in breast milk.
Hydroxychloroquine Pregnancy And Lactation Text: This medication has been shown to cause fetal harm but it isn't assigned a Pregnancy Risk Category. There are small amounts excreted in breast milk.
Metronidazole Pregnancy And Lactation Text: This medication is Pregnancy Category B and considered safe during pregnancy.  It is also excreted in breast milk.
Tetracycline Counseling: Patient counseled regarding possible photosensitivity and increased risk for sunburn.  Patient instructed to avoid sunlight, if possible.  When exposed to sunlight, patients should wear protective clothing, sunglasses, and sunscreen.  The patient was instructed to call the office immediately if the following severe adverse effects occur:  hearing changes, easy bruising/bleeding, severe headache, or vision changes.  The patient verbalized understanding of the proper use and possible adverse effects of tetracycline.  All of the patient's questions and concerns were addressed. Patient understands to avoid pregnancy while on therapy due to potential birth defects.
Tazorac Pregnancy And Lactation Text: This medication is not safe during pregnancy. It is unknown if this medication is excreted in breast milk.
Infliximab Counseling:  I discussed with the patient the risks of infliximab including but not limited to myelosuppression, immunosuppression, autoimmune hepatitis, demyelinating diseases, lymphoma, and serious infections.  The patient understands that monitoring is required including a PPD at baseline and must alert us or the primary physician if symptoms of infection or other concerning signs are noted.
Otezla Pregnancy And Lactation Text: This medication is Pregnancy Category C and it isn't known if it is safe during pregnancy. It is unknown if it is excreted in breast milk.
Tetracycline Pregnancy And Lactation Text: This medication is Pregnancy Category D and not consider safe during pregnancy. It is also excreted in breast milk.
Azithromycin Counseling:  I discussed with the patient the risks of azithromycin including but not limited to GI upset, allergic reaction, drug rash, diarrhea, and yeast infections.
Zyclara Pregnancy And Lactation Text: This medication is Pregnancy Category C. It is unknown if this medication is excreted in breast milk.
Odomzo Counseling- I discussed with the patient the risks of Odomzo including but not limited to nausea, vomiting, diarrhea, constipation, weight loss, changes in the sense of taste, decreased appetite, muscle spasms, and hair loss.  The patient verbalized understanding of the proper use and possible adverse effects of Odomzo.  All of the patient's questions and concerns were addressed.
Otezla Counseling: The side effects of Otezla were discussed with the patient, including but not limited to worsening or new depression, weight loss, diarrhea, nausea, upper respiratory tract infection, and headache. Patient instructed to call the office should any adverse effect occur.  The patient verbalized understanding of the proper use and possible adverse effects of Otezla.  All the patient's questions and concerns were addressed.
Glycopyrrolate Pregnancy And Lactation Text: This medication is Pregnancy Category B and is considered safe during pregnancy. It is unknown if it is excreted breast milk.
Humira Counseling:  I discussed with the patient the risks of adalimumab including but not limited to myelosuppression, immunosuppression, autoimmune hepatitis, demyelinating diseases, lymphoma, and serious infections.  The patient understands that monitoring is required including a PPD at baseline and must alert us or the primary physician if symptoms of infection or other concerning signs are noted.
Cyclosporine Pregnancy And Lactation Text: This medication is Pregnancy Category C and it isn't know if it is safe during pregnancy. This medication is excreted in breast milk.
Rifampin Pregnancy And Lactation Text: This medication is Pregnancy Category C and it isn't know if it is safe during pregnancy. It is also excreted in breast milk and should not be used if you are breast feeding.
Picato Counseling:  I discussed with the patient the risks of Picato including but not limited to erythema, scaling, itching, weeping, crusting, and pain.
Birth Control Pills Counseling: Birth Control Pill Counseling: I discussed with the patient the potential side effects of OCPs including but not limited to increased risk of stroke, heart attack, thrombophlebitis, deep venous thrombosis, hepatic adenomas, breast changes, GI upset, headaches, and depression.  The patient verbalized understanding of the proper use and possible adverse effects of OCPs. All of the patient's questions and concerns were addressed.
Simponi Counseling:  I discussed with the patient the risks of golimumab including but not limited to myelosuppression, immunosuppression, autoimmune hepatitis, demyelinating diseases, lymphoma, and serious infections.  The patient understands that monitoring is required including a PPD at baseline and must alert us or the primary physician if symptoms of infection or other concerning signs are noted.
Zyclara Counseling:  I discussed with the patient the risks of imiquimod including but not limited to erythema, scaling, itching, weeping, crusting, and pain.  Patient understands that the inflammatory response to imiquimod is variable from person to person and was educated regarded proper titration schedule.  If flu-like symptoms develop, patient knows to discontinue the medication and contact us.
Hydroxyzine Pregnancy And Lactation Text: This medication is not safe during pregnancy and should not be taken. It is also excreted in breast milk and breast feeding isn't recommended.
Ketoconazole Pregnancy And Lactation Text: This medication is Pregnancy Category C and it isn't know if it is safe during pregnancy. It is also excreted in breast milk and breast feeding isn't recommended.
Colchicine Counseling:  Patient counseled regarding adverse effects including but not limited to stomach upset (nausea, vomiting, stomach pain, or diarrhea).  Patient instructed to limit alcohol consumption while taking this medication.  Colchicine may reduce blood counts especially with prolonged use.  The patient understands that monitoring of kidney function and blood counts may be required, especially at baseline. The patient verbalized understanding of the proper use and possible adverse effects of colchicine.  All of the patient's questions and concerns were addressed.
Doxycycline Counseling:  Patient counseled regarding possible photosensitivity and increased risk for sunburn.  Patient instructed to avoid sunlight, if possible.  When exposed to sunlight, patients should wear protective clothing, sunglasses, and sunscreen.  The patient was instructed to call the office immediately if the following severe adverse effects occur:  hearing changes, easy bruising/bleeding, severe headache, or vision changes.  The patient verbalized understanding of the proper use and possible adverse effects of doxycycline.  All of the patient's questions and concerns were addressed.
Include Pregnancy/Lactation Warning?: No
Imiquimod Counseling:  I discussed with the patient the risks of imiquimod including but not limited to erythema, scaling, itching, weeping, crusting, and pain.  Patient understands that the inflammatory response to imiquimod is variable from person to person and was educated regarded proper titration schedule.  If flu-like symptoms develop, patient knows to discontinue the medication and contact us.
Spironolactone Counseling: Patient advised regarding risks of diarrhea, abdominal pain, hyperkalemia, birth defects (for female patients), liver toxicity and renal toxicity. The patient may need blood work to monitor liver and kidney function and potassium levels while on therapy. The patient verbalized understanding of the proper use and possible adverse effects of spironolactone.  All of the patient's questions and concerns were addressed.
SSKI Counseling:  I discussed with the patient the risks of SSKI including but not limited to thyroid abnormalities, metallic taste, GI upset, fever, headache, acne, arthralgias, paraesthesias, lymphadenopathy, easy bleeding, arrhythmias, and allergic reaction.
Bactrim Pregnancy And Lactation Text: This medication is Pregnancy Category D and is known to cause fetal risk.  It is also excreted in breast milk.
Birth Control Pills Pregnancy And Lactation Text: This medication should be avoided if pregnant and for the first 30 days post-partum.
Topical Sulfur Applications Counseling: Topical Sulfur Counseling: Patient counseled that this medication may cause skin irritation or allergic reactions.  In the event of skin irritation, the patient was advised to reduce the amount of the drug applied or use it less frequently.   The patient verbalized understanding of the proper use and possible adverse effects of topical sulfur application.  All of the patient's questions and concerns were addressed.
Minoxidil Counseling: Minoxidil is a topical medication which can increase blood flow where it is applied. It is uncertain how this medication increases hair growth. Side effects are uncommon and include stinging and allergic reactions.
Solaraze Pregnancy And Lactation Text: This medication is Pregnancy Category B and is considered safe. There is some data to suggest avoiding during the third trimester. It is unknown if this medication is excreted in breast milk.
Xolair Counseling:  Patient informed of potential adverse effects including but not limited to fever, muscle aches, rash and allergic reactions.  The patient verbalized understanding of the proper use and possible adverse effects of Xolair.  All of the patient's questions and concerns were addressed.
Bexarotene Pregnancy And Lactation Text: This medication is Pregnancy Category X and should not be given to women who are pregnant or may become pregnant. This medication should not be used if you are breast feeding.
Erivedge Counseling- I discussed with the patient the risks of Erivedge including but not limited to nausea, vomiting, diarrhea, constipation, weight loss, changes in the sense of taste, decreased appetite, muscle spasms, and hair loss.  The patient verbalized understanding of the proper use and possible adverse effects of Erivedge.  All of the patient's questions and concerns were addressed.
Xelsergioz Pregnancy And Lactation Text: This medication is Pregnancy Category D and is not considered safe during pregnancy.  The risk during breast feeding is also uncertain.
Isotretinoin Pregnancy And Lactation Text: This medication is Pregnancy Category X and is considered extremely dangerous during pregnancy. It is unknown if it is excreted in breast milk.
Xolair Pregnancy And Lactation Text: This medication is Pregnancy Category B and is considered safe during pregnancy. This medication is excreted in breast milk.
Bactrim Counseling:  I discussed with the patient the risks of sulfa antibiotics including but not limited to GI upset, allergic reaction, drug rash, diarrhea, dizziness, photosensitivity, and yeast infections.  Rarely, more serious reactions can occur including but not limited to aplastic anemia, agranulocytosis, methemoglobinemia, blood dyscrasias, liver or kidney failure, lung infiltrates or desquamative/blistering drug rashes.
Valtrex Pregnancy And Lactation Text: this medication is Pregnancy Category B and is considered safe during pregnancy. This medication is not directly found in breast milk but it's metabolite acyclovir is present.
Prednisone Counseling:  I discussed with the patient the risks of prolonged use of prednisone including but not limited to weight gain, insomnia, osteoporosis, mood changes, diabetes, susceptibility to infection, glaucoma and high blood pressure.  In cases where prednisone use is prolonged, patients should be monitored with blood pressure checks, serum glucose levels and an eye exam.  Additionally, the patient may need to be placed on GI prophylaxis, PCP prophylaxis, and calcium and vitamin D supplementation and/or a bisphosphonate.  The patient verbalized understanding of the proper use and the possible adverse effects of prednisone.  All of the patient's questions and concerns were addressed.
Gabapentin Counseling: I discussed with the patient the risks of gabapentin including but not limited to dizziness, somnolence, fatigue and ataxia.
Oxybutynin Counseling:  I discussed with the patient the risks of oxybutynin including but not limited to skin rash, drowsiness, dry mouth, difficulty urinating, and blurred vision.
Dapsone Pregnancy And Lactation Text: This medication is Pregnancy Category C and is not considered safe during pregnancy or breast feeding.
Erythromycin Counseling:  I discussed with the patient the risks of erythromycin including but not limited to GI upset, allergic reaction, drug rash, diarrhea, increase in liver enzymes, and yeast infections.
Acitretin Counseling:  I discussed with the patient the risks of acitretin including but not limited to hair loss, dry lips/skin/eyes, liver damage, hyperlipidemia, depression/suicidal ideation, photosensitivity.  Serious rare side effects can include but are not limited to pancreatitis, pseudotumor cerebri, bony changes, clot formation/stroke/heart attack.  Patient understands that alcohol is contraindicated since it can result in liver toxicity and significantly prolong the elimination of the drug by many years.
Bexarotene Counseling:  I discussed with the patient the risks of bexarotene including but not limited to hair loss, dry lips/skin/eyes, liver abnormalities, hyperlipidemia, pancreatitis, depression/suicidal ideation, photosensitivity, drug rash/allergic reactions, hypothyroidism, anemia, leukopenia, infection, cataracts, and teratogenicity.  Patient understands that they will need regular blood tests to check lipid profile, liver function tests, white blood cell count, thyroid function tests and pregnancy test if applicable.
High Dose Vitamin A Counseling: Side effects reviewed, pt to contact office should one occur.
Taltz Counseling: I discussed with the patient the risks of ixekizumab including but not limited to immunosuppression, serious infections, worsening of inflammatory bowel disease and drug reactions.  The patient understands that monitoring is required including a PPD at baseline and must alert us or the primary physician if symptoms of infection or other concerning signs are noted.
Cephalexin Pregnancy And Lactation Text: This medication is Pregnancy Category B and considered safe during pregnancy.  It is also excreted in breast milk but can be used safely for shorter doses.
Elidel Counseling: Patient may experience a mild burning sensation during topical application. Elidel is not approved in children less than 2 years of age. There have been case reports of hematologic and skin malignancies in patients using topical calcineurin inhibitors although causality is questionable.
Rituxan Counseling:  I discussed with the patient the risks of Rituxan infusions. Side effects can include infusion reactions, severe drug rashes including mucocutaneous reactions, reactivation of latent hepatitis and other infections and rarely progressive multifocal leukoencephalopathy.  All of the patient's questions and concerns were addressed.
Protopic Pregnancy And Lactation Text: This medication is Pregnancy Category C. It is unknown if this medication is excreted in breast milk when applied topically.
Drysol Pregnancy And Lactation Text: This medication is considered safe during pregnancy and breast feeding.
Azithromycin Pregnancy And Lactation Text: This medication is considered safe during pregnancy and is also secreted in breast milk.
Rifampin Counseling: I discussed with the patient the risks of rifampin including but not limited to liver damage, kidney damage, red-orange body fluids, nausea/vomiting and severe allergy.
Metronidazole Counseling:  I discussed with the patient the risks of metronidazole including but not limited to seizures, nausea/vomiting, a metallic taste in the mouth, nausea/vomiting and severe allergy.
Ketoconazole Counseling:   Patient counseled regarding improving absorption with orange juice.  Adverse effects include but are not limited to breast enlargement, headache, diarrhea, nausea, upset stomach, liver function test abnormalities, taste disturbance, and stomach pain.  There is a rare possibility of liver failure that can occur when taking ketoconazole. The patient understands that monitoring of LFTs may be required, especially at baseline. The patient verbalized understanding of the proper use and possible adverse effects of ketoconazole.  All of the patient's questions and concerns were addressed.
Dupixent Counseling: I discussed with the patient the risks of dupilumab including but not limited to eye infection and irritation, cold sores, injection site reactions, worsening of asthma, allergic reactions and increased risk of parasitic infection.  Live vaccines should be avoided while taking dupilumab. Dupilumab will also interact with certain medications such as warfarin and cyclosporine. The patient understands that monitoring is required and they must alert us or the primary physician if symptoms of infection or other concerning signs are noted.
Tremfya Counseling: I discussed with the patient the risks of guselkumab including but not limited to immunosuppression, serious infections, worsening of inflammatory bowel disease and drug reactions.  The patient understands that monitoring is required including a PPD at baseline and must alert us or the primary physician if symptoms of infection or other concerning signs are noted.
Quinolones Counseling:  I discussed with the patient the risks of fluoroquinolones including but not limited to GI upset, allergic reaction, drug rash, diarrhea, dizziness, photosensitivity, yeast infections, liver function test abnormalities, tendonitis/tendon rupture.
Hydroxyzine Counseling: Patient advised that the medication is sedating and not to drive a car after taking this medication.  Patient informed of potential adverse effects including but not limited to dry mouth, urinary retention, and blurry vision.  The patient verbalized understanding of the proper use and possible adverse effects of hydroxyzine.  All of the patient's questions and concerns were addressed.
Doxycycline Pregnancy And Lactation Text: This medication is Pregnancy Category D and not consider safe during pregnancy. It is also excreted in breast milk but is considered safe for shorter treatment courses.
Cimetidine Counseling:  I discussed with the patient the risks of Cimetidine including but not limited to gynecomastia, headache, diarrhea, nausea, drowsiness, arrhythmias, pancreatitis, skin rashes, psychosis, bone marrow suppression and kidney toxicity.
Benzoyl Peroxide Pregnancy And Lactation Text: This medication is Pregnancy Category C. It is unknown if benzoyl peroxide is excreted in breast milk.
Doxepin Pregnancy And Lactation Text: This medication is Pregnancy Category C and it isn't known if it is safe during pregnancy. It is also excreted in breast milk and breast feeding isn't recommended.
Cellcept Pregnancy And Lactation Text: This medication is Pregnancy Category D and isn't considered safe during pregnancy. It is unknown if this medication is excreted in breast milk.
Carac Counseling:  I discussed with the patient the risks of Carac including but not limited to erythema, scaling, itching, weeping, crusting, and pain.
Eucrisa Counseling: Patient may experience a mild burning sensation during topical application. Eucrisa is not approved in children less than 2 years of age.
Doxepin Counseling:  Patient advised that the medication is sedating and not to drive a car after taking this medication. Patient informed of potential adverse effects including but not limited to dry mouth, urinary retention, and blurry vision.  The patient verbalized understanding of the proper use and possible adverse effects of doxepin.  All of the patient's questions and concerns were addressed.
Itraconazole Counseling:  I discussed with the patient the risks of itraconazole including but not limited to liver damage, nausea/vomiting, neuropathy, and severe allergy.  The patient understands that this medication is best absorbed when taken with acidic beverages such as non-diet cola or ginger ale.  The patient understands that monitoring is required including baseline LFTs and repeat LFTs at intervals.  The patient understands that they are to contact us or the primary physician if concerning signs are noted.
Dapsone Counseling: I discussed with the patient the risks of dapsone including but not limited to hemolytic anemia, agranulocytosis, rashes, methemoglobinemia, kidney failure, peripheral neuropathy, headaches, GI upset, and liver toxicity.  Patients who start dapsone require monitoring including baseline LFTs and weekly CBCs for the first month, then every month thereafter.  The patient verbalized understanding of the proper use and possible adverse effects of dapsone.  All of the patient's questions and concerns were addressed.

## 2025-05-20 ENCOUNTER — APPOINTMENT (OUTPATIENT)
Dept: URBAN - METROPOLITAN AREA CLINIC 362 | Facility: CLINIC | Age: 70
Setting detail: DERMATOLOGY
End: 2025-05-20

## 2025-05-20 DIAGNOSIS — L12.0 BULLOUS PEMPHIGOID: ICD-10-CM

## 2025-05-20 PROBLEM — L30.9 DERMATITIS, UNSPECIFIED: Status: ACTIVE | Noted: 2025-05-20

## 2025-05-20 PROCEDURE — ? COUNSELING

## 2025-05-20 PROCEDURE — 11104 PUNCH BX SKIN SINGLE LESION: CPT

## 2025-05-20 PROCEDURE — 11105 PUNCH BX SKIN EA SEP/ADDL: CPT

## 2025-05-20 PROCEDURE — ? BIOPSY BY PUNCH METHOD FOR DIF

## 2025-05-20 PROCEDURE — ? PRESCRIPTION MEDICATION MANAGEMENT

## 2025-05-20 PROCEDURE — ? BIOPSY BY PUNCH METHOD

## 2025-05-20 ASSESSMENT — LOCATION ZONE DERM
LOCATION ZONE: TRUNK
LOCATION ZONE: LEG
LOCATION ZONE: ARM

## 2025-05-20 ASSESSMENT — LOCATION DETAILED DESCRIPTION DERM
LOCATION DETAILED: PERIUMBILICAL SKIN
LOCATION DETAILED: LEFT ANTERIOR PROXIMAL THIGH
LOCATION DETAILED: LEFT PROXIMAL DORSAL FOREARM
LOCATION DETAILED: RIGHT ANTERIOR DISTAL THIGH
LOCATION DETAILED: SUPERIOR THORACIC SPINE
LOCATION DETAILED: RIGHT PROXIMAL DORSAL FOREARM

## 2025-05-20 ASSESSMENT — LOCATION SIMPLE DESCRIPTION DERM
LOCATION SIMPLE: LEFT FOREARM
LOCATION SIMPLE: UPPER BACK
LOCATION SIMPLE: RIGHT FOREARM
LOCATION SIMPLE: RIGHT THIGH
LOCATION SIMPLE: LEFT THIGH
LOCATION SIMPLE: ABDOMEN

## 2025-05-20 NOTE — PROCEDURE: BIOPSY BY PUNCH METHOD
Detail Level: Detailed
Was A Bandage Applied: Yes
Punch Size In Mm: 4
Size Of Lesion In Cm (Optional): 0
Depth Of Punch Biopsy: dermis
Biopsy Type: H and E
Anesthesia Type: 1% lidocaine with epinephrine
Anesthesia Volume In Cc: 1
Hemostasis: Wound Closure
Epidermal Sutures: 4-0 Prolene
Wound Care: Petrolatum
Dressing: pressure dressing
Patient Will Remove Sutures At Home?: No
Lab: 6
Consent: Written consent was obtained and risks were reviewed including but not limited to scarring, infection, bleeding, scabbing, incomplete removal, nerve damage and allergy to anesthesia.
Post-Care Instructions: I reviewed with the patient in detail post-care instructions. Patient is to keep the biopsy site dry overnight, and then apply petrolatum twice a day.
Home Suture Removal Text: Patient was provided a home suture removal kit and will remove their sutures at home.  If they have any questions or difficulties they will call the office.
Notification Instructions: Patient will be notified of biopsy results. However, patient instructed to call the office if not contacted within 2 weeks.
Billing Type: Third-Party Bill
Information: Selecting Yes will display possible errors in your note based on the variables you have selected. This validation is only offered as a suggestion for you. PLEASE NOTE THAT THE VALIDATION TEXT WILL BE REMOVED WHEN YOU FINALIZE YOUR NOTE. IF YOU WANT TO FAX A PRELIMINARY NOTE YOU WILL NEED TO TOGGLE THIS TO 'NO' IF YOU DO NOT WANT IT IN YOUR FAXED NOTE.

## 2025-05-20 NOTE — PROCEDURE: PRESCRIPTION MEDICATION MANAGEMENT
Initiate Treatment: TAC 0.1% cream to body rash BID PRN itch
Plan: Patient has history of RA, methotrexate (though patient self-discontinued a couple months ago) and remicade (did not report remicade infusions on last visit med list). Possible psoriasis vs. lichenoid drug reaction. Given severity of itch will also check DIF for BP.
Render In Strict Bullet Format?: No
Detail Level: Zone

## 2025-05-20 NOTE — PROCEDURE: BIOPSY BY PUNCH METHOD FOR DIF
Detail Level: Detailed
Was A Bandage Applied: Yes
Punch Size In Mm: 4
Size Of Lesion In Cm (Optional): 0
Depth Of Punch Biopsy: dermis
Biopsy Type: DIF (perilesional)
Anesthesia Type: 1% lidocaine with epinephrine
Anesthesia Volume In Cc: 1
Hemostasis: None
Epidermal Sutures: 4-0 Prolene
Wound Care: Petrolatum
Dressing: pressure dressing
Suture Removal: 12 days
Patient Will Remove Sutures At Home?: No
Consent: Written consent was obtained and risks were reviewed including but not limited to scarring, infection, bleeding, scabbing, incomplete removal, nerve damage and allergy to anesthesia.
Post-Care Instructions: I reviewed with the patient in detail post-care instructions. Patient is to keep the biopsy site dry overnight, and then apply bacitracin twice daily until healed. Patient may apply hydrogen peroxide soaks to remove any crusting.
Home Suture Removal Text: Patient was provided a home suture removal kit and will remove their sutures at home.  If they have any questions or difficulties they will call the office.
Notification Instructions: Patient will be notified of biopsy results. However, patient instructed to call the office if not contacted within 2 weeks.
Billing Type: Third-Party Bill

## 2025-05-29 ENCOUNTER — TELEPHONE (OUTPATIENT)
Dept: SCHEDULING | Facility: CLINIC | Age: 70
End: 2025-05-29

## 2025-05-29 RX ORDER — HYDROXYCHLOROQUINE SULFATE 200 MG/1
400 TABLET, FILM COATED ORAL DAILY
OUTPATIENT
Start: 2025-05-29

## 2025-05-29 NOTE — TELEPHONE ENCOUNTER
Medication Sample Request    Clint Pappas    Medication/dosage:     apixaban (ELIQUIS) 5 mg     Contact number: [phone]      Additional note: pt asked if there are samples available

## 2025-05-29 NOTE — TELEPHONE ENCOUNTER
Samples placed at the .    Eliquis 5 mg x4 boxes Lot # [id number] exp 6/2026. Pt aware and said his wife will  samples.    While on the phone with me, patient states he is on his way to [facility] for acute onset SOB. Pt sounded SOB on the phone. I let him know I agree with KOFI montes de oca.

## 2025-06-03 ENCOUNTER — APPOINTMENT (OUTPATIENT)
Dept: URBAN - METROPOLITAN AREA CLINIC 362 | Facility: CLINIC | Age: 70
Setting detail: DERMATOLOGY
End: 2025-06-03

## 2025-06-03 DIAGNOSIS — L12.0 BULLOUS PEMPHIGOID: ICD-10-CM

## 2025-06-03 DIAGNOSIS — L21.8 OTHER SEBORRHEIC DERMATITIS: ICD-10-CM

## 2025-06-03 PROBLEM — L30.9 DERMATITIS, UNSPECIFIED: Status: ACTIVE | Noted: 2025-06-03

## 2025-06-03 PROCEDURE — ? PRESCRIPTION

## 2025-06-03 PROCEDURE — ? COUNSELING

## 2025-06-03 PROCEDURE — ? PRESCRIPTION MEDICATION MANAGEMENT

## 2025-06-03 PROCEDURE — ? SUTURE REMOVAL

## 2025-06-03 RX ORDER — FLUOCINONIDE 0.5 MG/ML
SOLUTION TOPICAL
Qty: 60 | Refills: 8 | Status: ERX | COMMUNITY
Start: 2025-06-03

## 2025-06-03 RX ADMIN — FLUOCINONIDE: 0.5 SOLUTION TOPICAL at 00:00

## 2025-06-03 ASSESSMENT — LOCATION DETAILED DESCRIPTION DERM
LOCATION DETAILED: RIGHT PROXIMAL DORSAL FOREARM
LOCATION DETAILED: RIGHT ANTERIOR DISTAL THIGH
LOCATION DETAILED: LEFT CENTRAL FRONTAL SCALP
LOCATION DETAILED: SUPERIOR THORACIC SPINE
LOCATION DETAILED: PERIUMBILICAL SKIN
LOCATION DETAILED: RIGHT CENTRAL FRONTAL SCALP
LOCATION DETAILED: LEFT ANTERIOR PROXIMAL THIGH
LOCATION DETAILED: LEFT PROXIMAL DORSAL FOREARM
LOCATION DETAILED: MEDIAL FRONTAL SCALP

## 2025-06-03 ASSESSMENT — LOCATION SIMPLE DESCRIPTION DERM
LOCATION SIMPLE: LEFT THIGH
LOCATION SIMPLE: FRONTAL SCALP
LOCATION SIMPLE: LEFT SCALP
LOCATION SIMPLE: ABDOMEN
LOCATION SIMPLE: RIGHT SCALP
LOCATION SIMPLE: LEFT FOREARM
LOCATION SIMPLE: RIGHT THIGH
LOCATION SIMPLE: UPPER BACK
LOCATION SIMPLE: RIGHT FOREARM

## 2025-06-03 ASSESSMENT — LOCATION ZONE DERM
LOCATION ZONE: TRUNK
LOCATION ZONE: ARM
LOCATION ZONE: SCALP
LOCATION ZONE: LEG

## 2025-06-03 NOTE — PROCEDURE: PRESCRIPTION MEDICATION MANAGEMENT
Continue Regimen: TAC 0.1% cream to body rash BID x 2 weeks on/2 weeks off PRN itch\\n\\nPrednisone as prescribed by ER
Plan: Patient has history of RA, methotrexate (though patient self-discontinued a couple months ago) and remicade (did not report remicade infusions on last visit med list). Possible psoriasis vs. lichenoid drug reaction. Given severity of itch will also check DIF for BP.
Render In Strict Bullet Format?: No
Detail Level: Zone
Discontinue Regimen: Clobetasol 0.05% scalp solution: patient discontinued \\nKetoconazole 2% shampoo: patient discontinued
Initiate Treatment: fluocinonide 0.05 % topical solution: Apply to scalp BID x 2 weeks, then BID weekends only\\n\\nVanicream dandruff Shampoo
Detail Level: Simple

## 2025-06-11 ENCOUNTER — TELEPHONE (OUTPATIENT)
Dept: SCHEDULING | Facility: CLINIC | Age: 70
End: 2025-06-11
Payer: MEDICARE

## 2025-06-11 ENCOUNTER — DOCUMENTATION (OUTPATIENT)
Dept: CARDIOLOGY | Facility: CLINIC | Age: 70
End: 2025-06-11
Payer: MEDICARE

## 2025-06-11 NOTE — TELEPHONE ENCOUNTER
"I spoke with patient. He tells me he tried to get his Eliquis and was told it was \"like $500 to pick it up.\" Patient's prescriptive insurance does not require a prior auth so I assume it is a deductible, one time cost. Patient has enough samples for one week. I let him know he does not require a prior auth, it is likely a deductible cost. Pt aware I placed more samples at the  along with an information card for Medicare that may help future costs. Pt verbalized understanding and appreciative of help.   "

## 2025-06-11 NOTE — TELEPHONE ENCOUNTER
Medication Clarification     Name of caller: Clint Pappas    Relationship to patient: Self     Name of patient: Clint DELMER Mian    Name of physician: Ajay Bhat MD    Name of medication: Blood thinners     What needs to be clarified: Patient was advised to CB and discuss with  which Blood thinner would work best for him. Patient stated he needs a refill on whichever medication she decides is best.     Best contact number: [phone]

## 2025-06-30 ENCOUNTER — APPOINTMENT (OUTPATIENT)
Dept: URBAN - METROPOLITAN AREA CLINIC 362 | Facility: CLINIC | Age: 70
Setting detail: DERMATOLOGY
End: 2025-06-30

## 2025-06-30 DIAGNOSIS — L40.0 PSORIASIS VULGARIS: ICD-10-CM

## 2025-06-30 PROCEDURE — ? SKYRIZI INITIATION

## 2025-06-30 PROCEDURE — ? COUNSELING

## 2025-06-30 PROCEDURE — ? PRESCRIPTION MEDICATION MANAGEMENT

## 2025-06-30 PROCEDURE — ? ORDER TESTS

## 2025-06-30 ASSESSMENT — BSA PSORIASIS: % BODY COVERED IN PSORIASIS: 16

## 2025-06-30 ASSESSMENT — LOCATION DETAILED DESCRIPTION DERM
LOCATION DETAILED: RIGHT PROXIMAL DORSAL FOREARM
LOCATION DETAILED: SUPERIOR THORACIC SPINE
LOCATION DETAILED: LEFT PROXIMAL DORSAL FOREARM
LOCATION DETAILED: PERIUMBILICAL SKIN
LOCATION DETAILED: RIGHT ANTERIOR DISTAL THIGH
LOCATION DETAILED: LEFT ANTERIOR PROXIMAL THIGH

## 2025-06-30 ASSESSMENT — LOCATION ZONE DERM
LOCATION ZONE: ARM
LOCATION ZONE: LEG
LOCATION ZONE: TRUNK

## 2025-06-30 ASSESSMENT — LOCATION SIMPLE DESCRIPTION DERM
LOCATION SIMPLE: LEFT THIGH
LOCATION SIMPLE: UPPER BACK
LOCATION SIMPLE: LEFT FOREARM
LOCATION SIMPLE: RIGHT THIGH
LOCATION SIMPLE: RIGHT FOREARM
LOCATION SIMPLE: ABDOMEN

## 2025-06-30 ASSESSMENT — PGA PSORIASIS: PGA PSORIASIS 2020: SEVERE

## 2025-06-30 ASSESSMENT — ITCH NUMERIC RATING SCALE: HOW SEVERE IS YOUR ITCHING?: 8

## 2025-06-30 NOTE — PROCEDURE: ORDER TESTS
Expected Date Of Service: 06/30/2025
Billing Type: Third-Party Bill
Bill For Surgical Tray: no
Performing Laboratory: -[phone]

## 2025-06-30 NOTE — PROCEDURE: PRESCRIPTION MEDICATION MANAGEMENT
Continue Regimen: TAC 0.1% cream to body rash BID x 2 weeks on/2 weeks off PRN itch\\n\\nPrednisone as prescribed by ER
Plan: Patient has history of RA, methotrexate (though patient self-discontinued a couple months ago) and remicade (did not report remicade infusions on last visit med list, but discontinued getting infusions, states they never helped much).\\n\\nHasn’t followed up with rheum recently, but has appointment with PCP in the near future. Will start initiation process for skyrizi, f/u once BI is completed.
Render In Strict Bullet Format?: No
Detail Level: Zone

## 2025-06-30 NOTE — PROCEDURE: SKYRIZI INITIATION
Is Methotrexate Contraindicated?: No
Skyrizi Dosing: 150 mg SC week 0 and week 4 then every 12 weeks thereafter
Include Weight Question: Yes - Pounds
Detail Level: Zone
Pregnancy And Lactation Warning Text: The risk during pregnancy and breastfeeding is uncertain with this medication.
Patient Weight In Pounds: 308
Diagnosis (Required): Psoriasis
Skyrizi Monitoring Guidelines: A yearly test for tuberculosis is required while taking Skyrizi.

## 2025-07-03 ENCOUNTER — DOCTOR'S OFFICE (OUTPATIENT)
Facility: LOCATION | Age: 70
Setting detail: OPHTHALMOLOGY
End: 2025-07-03
Payer: MEDICARE

## 2025-07-03 DIAGNOSIS — H43.812: ICD-10-CM

## 2025-07-03 DIAGNOSIS — M06.9: ICD-10-CM

## 2025-07-03 DIAGNOSIS — Z79.899: ICD-10-CM

## 2025-07-03 PROCEDURE — 99213 OFFICE O/P EST LOW 20 MIN: CPT

## 2025-07-03 PROCEDURE — 92250 FUNDUS PHOTOGRAPHY W/I&R: CPT

## 2025-07-08 ASSESSMENT — VISUAL ACUITY
OS_BCVA: 20/20
OD_BCVA: 20/20-2

## 2025-07-28 ENCOUNTER — RX ONLY (RX ONLY)
Age: 70
End: 2025-07-28

## 2025-07-28 RX ORDER — RISANKIZUMAB-RZAA 150 MG/ML
INJECTION SUBCUTANEOUS
Qty: 1 | Refills: 6 | Status: ERX | COMMUNITY
Start: 2025-07-28

## 2025-08-05 ENCOUNTER — DOCTOR'S OFFICE (OUTPATIENT)
Facility: LOCATION | Age: 70
Setting detail: OPHTHALMOLOGY
End: 2025-08-05
Payer: MEDICARE

## 2025-08-05 DIAGNOSIS — M06.9: ICD-10-CM

## 2025-08-05 DIAGNOSIS — Z79.899: ICD-10-CM

## 2025-08-05 DIAGNOSIS — H43.812: ICD-10-CM

## 2025-08-05 PROCEDURE — 99213 OFFICE O/P EST LOW 20 MIN: CPT

## 2025-08-05 PROCEDURE — 92250 FUNDUS PHOTOGRAPHY W/I&R: CPT

## 2025-08-05 ASSESSMENT — VISUAL ACUITY
OS_BCVA: 20/20
OD_BCVA: 20/20-2

## 2025-08-14 ENCOUNTER — OFFICE VISIT (OUTPATIENT)
Dept: CARDIOLOGY | Facility: CLINIC | Age: 70
End: 2025-08-14
Payer: MEDICARE

## 2025-08-14 VITALS — OXYGEN SATURATION: 98 % | DIASTOLIC BLOOD PRESSURE: 72 MMHG | SYSTOLIC BLOOD PRESSURE: 136 MMHG

## 2025-08-14 DIAGNOSIS — Z01.810 PRE-OPERATIVE CARDIOVASCULAR EXAMINATION: ICD-10-CM

## 2025-08-14 DIAGNOSIS — I48.0 PAF (PAROXYSMAL ATRIAL FIBRILLATION) (CMS/HCC): Primary | ICD-10-CM

## 2025-08-14 DIAGNOSIS — I10 PRIMARY HYPERTENSION: ICD-10-CM

## 2025-08-14 LAB
ATRIAL RATE: 74
P AXIS: 77
PR INTERVAL: 156
QRS DURATION: 108
QT INTERVAL: 428
QTC CALCULATION(BAZETT): 475
R AXIS: 10
T WAVE AXIS: 62
VENTRICULAR RATE: 74

## 2025-08-14 PROCEDURE — G8754 DIAS BP LESS 90: HCPCS | Performed by: NURSE PRACTITIONER

## 2025-08-14 PROCEDURE — G8752 SYS BP LESS 140: HCPCS | Performed by: NURSE PRACTITIONER

## 2025-08-14 PROCEDURE — G2211 COMPLEX E/M VISIT ADD ON: HCPCS | Performed by: NURSE PRACTITIONER

## 2025-08-14 PROCEDURE — 99215 OFFICE O/P EST HI 40 MIN: CPT | Performed by: NURSE PRACTITIONER

## 2025-08-14 PROCEDURE — 93000 ELECTROCARDIOGRAM COMPLETE: CPT | Performed by: NURSE PRACTITIONER

## 2025-08-14 RX ORDER — FUROSEMIDE 20 MG/1
20 TABLET ORAL 3 TIMES WEEKLY
Qty: 36 TABLET | Refills: 1 | Status: SHIPPED | OUTPATIENT
Start: 2025-08-15 | End: 2026-02-11

## 2025-08-14 ASSESSMENT — ENCOUNTER SYMPTOMS
WEIGHT GAIN: 0
SYNCOPE: 0
HEADACHES: 0
DISTURBANCES IN COORDINATION: 0
CLAUDICATION: 0
ORTHOPNEA: 0
ABDOMINAL PAIN: 0
NERVOUS/ANXIOUS: 0
HEARTBURN: 0
SNORING: 0
BACK PAIN: 1
NAUSEA: 0
PALPITATIONS: 0
PND: 0
DYSPNEA ON EXERTION: 1
HEMATURIA: 0
ANOREXIA: 0
SPUTUM PRODUCTION: 1
DIZZINESS: 0
WHEEZING: 0
COUGH: 1
BLURRED VISION: 0
IRREGULAR HEARTBEAT: 0
NEAR-SYNCOPE: 0
SHORTNESS OF BREATH: 0
DYSURIA: 0
MYALGIAS: 0
NIGHT SWEATS: 0
WEAKNESS: 0

## (undated) DEVICE — RADPAD Y FEMORAL ANGIOGRAPHY SHIELD

## (undated) DEVICE — GLIDESHEATH SLENDER SS (.021) 6FR 10CM

## (undated) DEVICE — KIT ACIST MANIFOLD TRANSDR

## (undated) DEVICE — Device

## (undated) DEVICE — CONTROLLER ACIST PREMIUM HAND

## (undated) DEVICE — GUIDEWIRE DIAGNOSTIC J .035. 150 (ORDER IN 5'S)

## (undated) DEVICE — DRAPE APERTURE 16X16

## (undated) DEVICE — CATHETER 6FR SWAN

## (undated) DEVICE — GUIDEWIRE DIAGNOSTIC 035-260 EXCHANGE

## (undated) DEVICE — CATH 6FR AL I

## (undated) DEVICE — TR BAND REGULAR

## (undated) DEVICE — CATH D 6F FR4 100CM

## (undated) DEVICE — CATH LAUNCHER GUIDE EBU 3.5/6FR

## (undated) DEVICE — GUIDEWIRE PRESSURE VERRATA PLUS J-TIP 185CM

## (undated) DEVICE — CATH DIAG INFINITI 6FR 3DRC

## (undated) DEVICE — KIT CATH LAB ANGIO

## (undated) DEVICE — CATH 6FR FL4

## (undated) DEVICE — CATH GUIDING 6FR .070 JL 4